# Patient Record
Sex: FEMALE | Race: WHITE | NOT HISPANIC OR LATINO | Employment: OTHER | URBAN - METROPOLITAN AREA
[De-identification: names, ages, dates, MRNs, and addresses within clinical notes are randomized per-mention and may not be internally consistent; named-entity substitution may affect disease eponyms.]

---

## 2017-01-06 ENCOUNTER — AMBULATORY - HEALTHEAST (OUTPATIENT)
Dept: INFUSION THERAPY | Facility: CLINIC | Age: 77
End: 2017-01-06

## 2017-01-06 ENCOUNTER — OFFICE VISIT - HEALTHEAST (OUTPATIENT)
Dept: ONCOLOGY | Facility: CLINIC | Age: 77
End: 2017-01-06

## 2017-01-06 ENCOUNTER — INFUSION - HEALTHEAST (OUTPATIENT)
Dept: INFUSION THERAPY | Facility: CLINIC | Age: 77
End: 2017-01-06

## 2017-01-06 DIAGNOSIS — D45 POLYCYTHEMIA VERA (H): ICD-10-CM

## 2017-01-06 ASSESSMENT — MIFFLIN-ST. JEOR: SCORE: 1052.18

## 2017-02-01 ENCOUNTER — INFUSION - HEALTHEAST (OUTPATIENT)
Dept: INFUSION THERAPY | Facility: CLINIC | Age: 77
End: 2017-02-01

## 2017-02-01 DIAGNOSIS — D45 POLYCYTHEMIA VERA (H): ICD-10-CM

## 2017-03-08 ENCOUNTER — INFUSION - HEALTHEAST (OUTPATIENT)
Dept: INFUSION THERAPY | Facility: CLINIC | Age: 77
End: 2017-03-08

## 2017-03-08 DIAGNOSIS — D45 POLYCYTHEMIA VERA (H): ICD-10-CM

## 2017-03-29 ENCOUNTER — COMMUNICATION - HEALTHEAST (OUTPATIENT)
Dept: FAMILY MEDICINE | Facility: CLINIC | Age: 77
End: 2017-03-29

## 2017-04-03 ENCOUNTER — OFFICE VISIT - HEALTHEAST (OUTPATIENT)
Dept: FAMILY MEDICINE | Facility: CLINIC | Age: 77
End: 2017-04-03

## 2017-04-03 DIAGNOSIS — H26.9 CATARACT: ICD-10-CM

## 2017-04-03 DIAGNOSIS — Z01.818 PREOP EXAMINATION: ICD-10-CM

## 2017-04-03 ASSESSMENT — MIFFLIN-ST. JEOR: SCORE: 1027.47

## 2017-04-14 ENCOUNTER — INFUSION - HEALTHEAST (OUTPATIENT)
Dept: INFUSION THERAPY | Facility: CLINIC | Age: 77
End: 2017-04-14

## 2017-04-14 DIAGNOSIS — D45 POLYCYTHEMIA VERA (H): ICD-10-CM

## 2017-05-10 ENCOUNTER — OFFICE VISIT - HEALTHEAST (OUTPATIENT)
Dept: ONCOLOGY | Facility: CLINIC | Age: 77
End: 2017-05-10

## 2017-05-10 ENCOUNTER — AMBULATORY - HEALTHEAST (OUTPATIENT)
Dept: INFUSION THERAPY | Facility: CLINIC | Age: 77
End: 2017-05-10

## 2017-05-10 DIAGNOSIS — D45 POLYCYTHEMIA VERA (H): ICD-10-CM

## 2017-05-12 ENCOUNTER — OFFICE VISIT - HEALTHEAST (OUTPATIENT)
Dept: FAMILY MEDICINE | Facility: CLINIC | Age: 77
End: 2017-05-12

## 2017-05-12 DIAGNOSIS — Z01.818 PREOP EXAMINATION: ICD-10-CM

## 2017-05-12 DIAGNOSIS — H26.9 CATARACT: ICD-10-CM

## 2017-08-08 ENCOUNTER — COMMUNICATION - HEALTHEAST (OUTPATIENT)
Dept: ONCOLOGY | Facility: CLINIC | Age: 77
End: 2017-08-08

## 2017-09-08 ENCOUNTER — AMBULATORY - HEALTHEAST (OUTPATIENT)
Dept: INFUSION THERAPY | Facility: CLINIC | Age: 77
End: 2017-09-08

## 2017-09-08 ENCOUNTER — COMMUNICATION - HEALTHEAST (OUTPATIENT)
Dept: ONCOLOGY | Facility: HOSPITAL | Age: 77
End: 2017-09-08

## 2017-09-08 ENCOUNTER — OFFICE VISIT - HEALTHEAST (OUTPATIENT)
Dept: ONCOLOGY | Facility: CLINIC | Age: 77
End: 2017-09-08

## 2017-09-08 DIAGNOSIS — D45 POLYCYTHEMIA VERA (H): ICD-10-CM

## 2017-09-11 ENCOUNTER — COMMUNICATION - HEALTHEAST (OUTPATIENT)
Dept: ONCOLOGY | Facility: HOSPITAL | Age: 77
End: 2017-09-11

## 2017-09-12 ENCOUNTER — COMMUNICATION - HEALTHEAST (OUTPATIENT)
Dept: ADMINISTRATIVE | Facility: HOSPITAL | Age: 77
End: 2017-09-12

## 2017-09-18 ENCOUNTER — COMMUNICATION - HEALTHEAST (OUTPATIENT)
Dept: ONCOLOGY | Facility: CLINIC | Age: 77
End: 2017-09-18

## 2017-09-21 ENCOUNTER — INFUSION - HEALTHEAST (OUTPATIENT)
Dept: INFUSION THERAPY | Facility: CLINIC | Age: 77
End: 2017-09-21

## 2017-09-21 DIAGNOSIS — D45 POLYCYTHEMIA VERA (H): ICD-10-CM

## 2017-10-12 ENCOUNTER — OFFICE VISIT - HEALTHEAST (OUTPATIENT)
Dept: FAMILY MEDICINE | Facility: CLINIC | Age: 77
End: 2017-10-12

## 2017-10-12 DIAGNOSIS — M54.16 LUMBAR RADICULOPATHY: ICD-10-CM

## 2017-10-13 ENCOUNTER — COMMUNICATION - HEALTHEAST (OUTPATIENT)
Dept: FAMILY MEDICINE | Facility: CLINIC | Age: 77
End: 2017-10-13

## 2017-10-26 ENCOUNTER — RECORDS - HEALTHEAST (OUTPATIENT)
Dept: ADMINISTRATIVE | Facility: OTHER | Age: 77
End: 2017-10-26

## 2017-11-02 ENCOUNTER — COMMUNICATION - HEALTHEAST (OUTPATIENT)
Dept: FAMILY MEDICINE | Facility: CLINIC | Age: 77
End: 2017-11-02

## 2017-11-02 ENCOUNTER — INFUSION - HEALTHEAST (OUTPATIENT)
Dept: INFUSION THERAPY | Facility: CLINIC | Age: 77
End: 2017-11-02

## 2017-11-02 ENCOUNTER — AMBULATORY - HEALTHEAST (OUTPATIENT)
Dept: FAMILY MEDICINE | Facility: CLINIC | Age: 77
End: 2017-11-02

## 2017-11-02 DIAGNOSIS — D45 POLYCYTHEMIA VERA (H): ICD-10-CM

## 2017-11-02 DIAGNOSIS — M54.50 LOWER BACK PAIN: ICD-10-CM

## 2017-11-17 ENCOUNTER — OFFICE VISIT - HEALTHEAST (OUTPATIENT)
Dept: PHYSICAL THERAPY | Facility: REHABILITATION | Age: 77
End: 2017-11-17

## 2017-11-17 DIAGNOSIS — M54.42 CHRONIC LOW BACK PAIN WITH LEFT-SIDED SCIATICA: ICD-10-CM

## 2017-11-17 DIAGNOSIS — M54.42 CHRONIC LEFT-SIDED LOW BACK PAIN WITH LEFT-SIDED SCIATICA: ICD-10-CM

## 2017-11-17 DIAGNOSIS — R29.898 WEAKNESS OF LEFT HIP: ICD-10-CM

## 2017-11-17 DIAGNOSIS — G89.29 CHRONIC LEFT-SIDED LOW BACK PAIN WITH LEFT-SIDED SCIATICA: ICD-10-CM

## 2017-11-17 DIAGNOSIS — G89.29 CHRONIC LOW BACK PAIN WITH LEFT-SIDED SCIATICA: ICD-10-CM

## 2017-11-17 DIAGNOSIS — R29.3 POOR POSTURE: ICD-10-CM

## 2017-11-21 ENCOUNTER — OFFICE VISIT - HEALTHEAST (OUTPATIENT)
Dept: PHYSICAL THERAPY | Facility: REHABILITATION | Age: 77
End: 2017-11-21

## 2017-11-21 DIAGNOSIS — G89.29 CHRONIC LOW BACK PAIN WITH LEFT-SIDED SCIATICA: ICD-10-CM

## 2017-11-21 DIAGNOSIS — M54.42 CHRONIC LEFT-SIDED LOW BACK PAIN WITH LEFT-SIDED SCIATICA: ICD-10-CM

## 2017-11-21 DIAGNOSIS — M54.42 CHRONIC LOW BACK PAIN WITH LEFT-SIDED SCIATICA: ICD-10-CM

## 2017-11-21 DIAGNOSIS — G89.29 CHRONIC LEFT-SIDED LOW BACK PAIN WITH LEFT-SIDED SCIATICA: ICD-10-CM

## 2017-11-21 DIAGNOSIS — R29.3 POOR POSTURE: ICD-10-CM

## 2017-11-21 DIAGNOSIS — R29.898 WEAKNESS OF LEFT HIP: ICD-10-CM

## 2017-11-29 ENCOUNTER — OFFICE VISIT - HEALTHEAST (OUTPATIENT)
Dept: PHYSICAL THERAPY | Facility: REHABILITATION | Age: 77
End: 2017-11-29

## 2017-11-29 DIAGNOSIS — G89.29 CHRONIC LEFT-SIDED LOW BACK PAIN WITH LEFT-SIDED SCIATICA: ICD-10-CM

## 2017-11-29 DIAGNOSIS — M54.42 CHRONIC LEFT-SIDED LOW BACK PAIN WITH LEFT-SIDED SCIATICA: ICD-10-CM

## 2017-11-29 DIAGNOSIS — R29.3 POOR POSTURE: ICD-10-CM

## 2017-11-29 DIAGNOSIS — R29.898 WEAKNESS OF LEFT HIP: ICD-10-CM

## 2017-12-13 ENCOUNTER — OFFICE VISIT - HEALTHEAST (OUTPATIENT)
Dept: PHYSICAL THERAPY | Facility: REHABILITATION | Age: 77
End: 2017-12-13

## 2017-12-13 DIAGNOSIS — G89.29 CHRONIC LEFT-SIDED LOW BACK PAIN WITH LEFT-SIDED SCIATICA: ICD-10-CM

## 2017-12-13 DIAGNOSIS — R29.3 POOR POSTURE: ICD-10-CM

## 2017-12-13 DIAGNOSIS — M54.42 CHRONIC LEFT-SIDED LOW BACK PAIN WITH LEFT-SIDED SCIATICA: ICD-10-CM

## 2017-12-13 DIAGNOSIS — R29.898 WEAKNESS OF LEFT HIP: ICD-10-CM

## 2017-12-14 ENCOUNTER — OFFICE VISIT - HEALTHEAST (OUTPATIENT)
Dept: ONCOLOGY | Facility: CLINIC | Age: 77
End: 2017-12-14

## 2017-12-14 ENCOUNTER — INFUSION - HEALTHEAST (OUTPATIENT)
Dept: INFUSION THERAPY | Facility: CLINIC | Age: 77
End: 2017-12-14

## 2017-12-14 ENCOUNTER — AMBULATORY - HEALTHEAST (OUTPATIENT)
Dept: INFUSION THERAPY | Facility: CLINIC | Age: 77
End: 2017-12-14

## 2017-12-14 DIAGNOSIS — D45 POLYCYTHEMIA VERA (H): ICD-10-CM

## 2017-12-14 ASSESSMENT — MIFFLIN-ST. JEOR: SCORE: 1027.92

## 2018-02-08 ENCOUNTER — INFUSION - HEALTHEAST (OUTPATIENT)
Dept: INFUSION THERAPY | Facility: CLINIC | Age: 78
End: 2018-02-08

## 2018-02-08 DIAGNOSIS — D45 POLYCYTHEMIA VERA (H): ICD-10-CM

## 2018-02-08 LAB
HCT VFR BLD AUTO: 45.2 % (ref 35–47)
HGB BLD-MCNC: 14.7 G/DL (ref 12–16)

## 2018-04-03 ENCOUNTER — OFFICE VISIT - HEALTHEAST (OUTPATIENT)
Dept: FAMILY MEDICINE | Facility: CLINIC | Age: 78
End: 2018-04-03

## 2018-04-03 ENCOUNTER — RECORDS - HEALTHEAST (OUTPATIENT)
Dept: GENERAL RADIOLOGY | Facility: CLINIC | Age: 78
End: 2018-04-03

## 2018-04-03 DIAGNOSIS — Z12.11 SCREEN FOR COLON CANCER: ICD-10-CM

## 2018-04-03 DIAGNOSIS — S49.90XA UNSPECIFIED INJURY OF SHOULDER AND UPPER ARM, UNSPECIFIED ARM, INITIAL ENCOUNTER: ICD-10-CM

## 2018-04-03 DIAGNOSIS — S49.90XA ARM INJURY: ICD-10-CM

## 2018-04-25 ENCOUNTER — INFUSION - HEALTHEAST (OUTPATIENT)
Dept: INFUSION THERAPY | Facility: CLINIC | Age: 78
End: 2018-04-25

## 2018-04-25 DIAGNOSIS — D45 POLYCYTHEMIA VERA (H): ICD-10-CM

## 2018-04-25 LAB
HCT VFR BLD AUTO: 48.3 % (ref 35–47)
HGB BLD-MCNC: 15.4 G/DL (ref 12–16)

## 2018-04-30 ENCOUNTER — OFFICE VISIT - HEALTHEAST (OUTPATIENT)
Dept: FAMILY MEDICINE | Facility: CLINIC | Age: 78
End: 2018-04-30

## 2018-04-30 DIAGNOSIS — M79.603 ARM PAIN: ICD-10-CM

## 2018-05-11 ENCOUNTER — COMMUNICATION - HEALTHEAST (OUTPATIENT)
Dept: ADMINISTRATIVE | Facility: CLINIC | Age: 78
End: 2018-05-11

## 2018-05-18 ENCOUNTER — AMBULATORY - HEALTHEAST (OUTPATIENT)
Dept: FAMILY MEDICINE | Facility: CLINIC | Age: 78
End: 2018-05-18

## 2018-05-18 DIAGNOSIS — M79.629 AXILLARY PAIN: ICD-10-CM

## 2018-05-18 DIAGNOSIS — M79.603 ARM PAIN: ICD-10-CM

## 2018-05-25 ENCOUNTER — RECORDS - HEALTHEAST (OUTPATIENT)
Dept: ADMINISTRATIVE | Facility: OTHER | Age: 78
End: 2018-05-25

## 2018-05-29 ENCOUNTER — OFFICE VISIT - HEALTHEAST (OUTPATIENT)
Dept: FAMILY MEDICINE | Facility: CLINIC | Age: 78
End: 2018-05-29

## 2018-05-29 DIAGNOSIS — M81.0 SENILE OSTEOPOROSIS: ICD-10-CM

## 2018-05-29 DIAGNOSIS — D45 POLYCYTHEMIA VERA (H): ICD-10-CM

## 2018-05-29 DIAGNOSIS — Z76.89 ESTABLISHING CARE WITH NEW DOCTOR, ENCOUNTER FOR: ICD-10-CM

## 2018-05-29 DIAGNOSIS — M79.622 PAIN OF LEFT UPPER ARM: ICD-10-CM

## 2018-05-29 ASSESSMENT — MIFFLIN-ST. JEOR: SCORE: 1022.99

## 2018-06-06 ENCOUNTER — COMMUNICATION - HEALTHEAST (OUTPATIENT)
Dept: FAMILY MEDICINE | Facility: CLINIC | Age: 78
End: 2018-06-06

## 2018-06-06 DIAGNOSIS — M75.101 ROTATOR CUFF TEAR ARTHROPATHY OF RIGHT SHOULDER: ICD-10-CM

## 2018-06-06 DIAGNOSIS — M12.811 ROTATOR CUFF TEAR ARTHROPATHY OF RIGHT SHOULDER: ICD-10-CM

## 2018-06-26 ENCOUNTER — OFFICE VISIT - HEALTHEAST (OUTPATIENT)
Dept: ONCOLOGY | Facility: CLINIC | Age: 78
End: 2018-06-26

## 2018-06-26 ENCOUNTER — INFUSION - HEALTHEAST (OUTPATIENT)
Dept: INFUSION THERAPY | Facility: CLINIC | Age: 78
End: 2018-06-26

## 2018-06-26 ENCOUNTER — AMBULATORY - HEALTHEAST (OUTPATIENT)
Dept: INFUSION THERAPY | Facility: CLINIC | Age: 78
End: 2018-06-26

## 2018-06-26 DIAGNOSIS — D45 POLYCYTHEMIA VERA (H): ICD-10-CM

## 2018-06-26 LAB
HCT VFR BLD AUTO: 45.7 % (ref 35–47)
HGB BLD-MCNC: 14.8 G/DL (ref 12–16)

## 2018-06-26 ASSESSMENT — MIFFLIN-ST. JEOR: SCORE: 1031.59

## 2018-07-10 ENCOUNTER — RECORDS - HEALTHEAST (OUTPATIENT)
Dept: ADMINISTRATIVE | Facility: OTHER | Age: 78
End: 2018-07-10

## 2018-08-21 ENCOUNTER — RECORDS - HEALTHEAST (OUTPATIENT)
Dept: ADMINISTRATIVE | Facility: OTHER | Age: 78
End: 2018-08-21

## 2018-08-29 ENCOUNTER — INFUSION - HEALTHEAST (OUTPATIENT)
Dept: INFUSION THERAPY | Facility: CLINIC | Age: 78
End: 2018-08-29

## 2018-08-29 DIAGNOSIS — D45 POLYCYTHEMIA VERA (H): ICD-10-CM

## 2018-08-29 LAB
HCT VFR BLD AUTO: 44 % (ref 35–47)
HGB BLD-MCNC: 14.6 G/DL (ref 12–16)

## 2018-11-06 ENCOUNTER — INFUSION - HEALTHEAST (OUTPATIENT)
Dept: INFUSION THERAPY | Facility: CLINIC | Age: 78
End: 2018-11-06

## 2018-11-06 DIAGNOSIS — D45 POLYCYTHEMIA VERA (H): ICD-10-CM

## 2018-11-06 LAB
HCT VFR BLD AUTO: 49.2 % (ref 35–47)
HGB BLD-MCNC: 15.7 G/DL (ref 12–16)

## 2019-01-09 ENCOUNTER — AMBULATORY - HEALTHEAST (OUTPATIENT)
Dept: INFUSION THERAPY | Facility: CLINIC | Age: 79
End: 2019-01-09

## 2019-01-09 ENCOUNTER — OFFICE VISIT - HEALTHEAST (OUTPATIENT)
Dept: ONCOLOGY | Facility: CLINIC | Age: 79
End: 2019-01-09

## 2019-01-09 ENCOUNTER — INFUSION - HEALTHEAST (OUTPATIENT)
Dept: INFUSION THERAPY | Facility: CLINIC | Age: 79
End: 2019-01-09

## 2019-01-09 DIAGNOSIS — D45 POLYCYTHEMIA VERA (H): ICD-10-CM

## 2019-01-09 LAB
HCT VFR BLD AUTO: 46.8 % (ref 35–47)
HGB BLD-MCNC: 14.9 G/DL (ref 12–16)

## 2019-03-20 ENCOUNTER — INFUSION - HEALTHEAST (OUTPATIENT)
Dept: INFUSION THERAPY | Facility: CLINIC | Age: 79
End: 2019-03-20

## 2019-03-20 DIAGNOSIS — D45 POLYCYTHEMIA VERA (H): ICD-10-CM

## 2019-03-20 LAB
HCT VFR BLD AUTO: 46.4 % (ref 35–47)
HGB BLD-MCNC: 14.8 G/DL (ref 12–16)

## 2019-06-12 ENCOUNTER — INFUSION - HEALTHEAST (OUTPATIENT)
Dept: INFUSION THERAPY | Facility: CLINIC | Age: 79
End: 2019-06-12

## 2019-06-12 DIAGNOSIS — D45 POLYCYTHEMIA VERA (H): ICD-10-CM

## 2019-06-12 LAB
HCT VFR BLD AUTO: 48.5 % (ref 35–47)
HGB BLD-MCNC: 15.4 G/DL (ref 12–16)

## 2019-09-10 ENCOUNTER — AMBULATORY - HEALTHEAST (OUTPATIENT)
Dept: INFUSION THERAPY | Facility: CLINIC | Age: 79
End: 2019-09-10

## 2019-09-10 ENCOUNTER — OFFICE VISIT - HEALTHEAST (OUTPATIENT)
Dept: ONCOLOGY | Facility: CLINIC | Age: 79
End: 2019-09-10

## 2019-09-10 ENCOUNTER — INFUSION - HEALTHEAST (OUTPATIENT)
Dept: INFUSION THERAPY | Facility: CLINIC | Age: 79
End: 2019-09-10

## 2019-09-10 DIAGNOSIS — D45 POLYCYTHEMIA VERA (H): ICD-10-CM

## 2019-09-10 DIAGNOSIS — F41.9 ANXIETY: ICD-10-CM

## 2019-09-10 LAB
HCT VFR BLD AUTO: 50.7 % (ref 35–47)
HGB BLD-MCNC: 16.4 G/DL (ref 12–16)

## 2019-10-08 ENCOUNTER — INFUSION - HEALTHEAST (OUTPATIENT)
Dept: INFUSION THERAPY | Facility: CLINIC | Age: 79
End: 2019-10-08

## 2019-10-08 DIAGNOSIS — D45 POLYCYTHEMIA VERA (H): ICD-10-CM

## 2019-11-05 ENCOUNTER — INFUSION - HEALTHEAST (OUTPATIENT)
Dept: INFUSION THERAPY | Facility: CLINIC | Age: 79
End: 2019-11-05

## 2019-11-05 ENCOUNTER — AMBULATORY - HEALTHEAST (OUTPATIENT)
Dept: INFUSION THERAPY | Facility: CLINIC | Age: 79
End: 2019-11-05

## 2019-11-05 ENCOUNTER — OFFICE VISIT - HEALTHEAST (OUTPATIENT)
Dept: ONCOLOGY | Facility: CLINIC | Age: 79
End: 2019-11-05

## 2019-11-05 DIAGNOSIS — D75.1 POLYCYTHEMIA: ICD-10-CM

## 2019-11-05 DIAGNOSIS — D45 POLYCYTHEMIA VERA (H): ICD-10-CM

## 2019-11-05 LAB
HCT VFR BLD AUTO: 44.9 % (ref 35–47)
HGB BLD-MCNC: 14.4 G/DL (ref 12–16)

## 2019-11-12 ENCOUNTER — OFFICE VISIT - HEALTHEAST (OUTPATIENT)
Dept: FAMILY MEDICINE | Facility: CLINIC | Age: 79
End: 2019-11-12

## 2019-11-12 DIAGNOSIS — R00.2 PALPITATIONS: ICD-10-CM

## 2019-11-12 DIAGNOSIS — D35.1 BENIGN NEOPLASM OF PARATHYROID GLAND: ICD-10-CM

## 2019-11-12 DIAGNOSIS — I49.3 PVC'S (PREMATURE VENTRICULAR CONTRACTIONS): ICD-10-CM

## 2019-11-12 DIAGNOSIS — M81.0 SENILE OSTEOPOROSIS: ICD-10-CM

## 2019-11-12 DIAGNOSIS — G89.29 CHRONIC LEFT-SIDED LOW BACK PAIN WITH LEFT-SIDED SCIATICA: ICD-10-CM

## 2019-11-12 DIAGNOSIS — D45 POLYCYTHEMIA VERA (H): ICD-10-CM

## 2019-11-12 DIAGNOSIS — F41.1 GAD (GENERALIZED ANXIETY DISORDER): ICD-10-CM

## 2019-11-12 DIAGNOSIS — M54.42 CHRONIC LEFT-SIDED LOW BACK PAIN WITH LEFT-SIDED SCIATICA: ICD-10-CM

## 2019-11-12 DIAGNOSIS — Z13.228 SCREENING FOR METABOLIC DISORDER: ICD-10-CM

## 2019-11-12 DIAGNOSIS — L21.9 SEBORRHEIC DERMATITIS OF SCALP: ICD-10-CM

## 2019-11-12 DIAGNOSIS — Z00.01 ENCOUNTER FOR GENERAL ADULT MEDICAL EXAMINATION WITH ABNORMAL FINDINGS: ICD-10-CM

## 2019-11-12 LAB
ATRIAL RATE - MUSE: 69 BPM
CALCIUM SERPL-MCNC: 9.3 MG/DL (ref 8.5–10.5)
CHOLEST SERPL-MCNC: 197 MG/DL
DIASTOLIC BLOOD PRESSURE - MUSE: NORMAL
FASTING STATUS PATIENT QL REPORTED: YES
FASTING STATUS PATIENT QL REPORTED: YES
GLUCOSE BLD-MCNC: 96 MG/DL (ref 70–99)
HDLC SERPL-MCNC: 57 MG/DL
INTERPRETATION ECG - MUSE: NORMAL
LDLC SERPL CALC-MCNC: 118 MG/DL
P AXIS - MUSE: 73 DEGREES
PR INTERVAL - MUSE: 136 MS
PTH-INTACT SERPL-MCNC: 48 PG/ML (ref 10–86)
QRS DURATION - MUSE: 82 MS
QT - MUSE: 402 MS
QTC - MUSE: 430 MS
R AXIS - MUSE: 6 DEGREES
SYSTOLIC BLOOD PRESSURE - MUSE: NORMAL
T AXIS - MUSE: 49 DEGREES
TRIGL SERPL-MCNC: 112 MG/DL
VENTRICULAR RATE- MUSE: 69 BPM

## 2019-11-12 RX ORDER — CLOBETASOL PROPIONATE 0.5 MG/ML
SOLUTION TOPICAL
Qty: 50 ML | Refills: 3 | Status: SHIPPED | OUTPATIENT
Start: 2019-11-12 | End: 2021-08-26

## 2019-11-12 ASSESSMENT — ANXIETY QUESTIONNAIRES
4. TROUBLE RELAXING: NOT AT ALL
IF YOU CHECKED OFF ANY PROBLEMS ON THIS QUESTIONNAIRE, HOW DIFFICULT HAVE THESE PROBLEMS MADE IT FOR YOU TO DO YOUR WORK, TAKE CARE OF THINGS AT HOME, OR GET ALONG WITH OTHER PEOPLE: NOT DIFFICULT AT ALL
1. FEELING NERVOUS, ANXIOUS, OR ON EDGE: MORE THAN HALF THE DAYS
6. BECOMING EASILY ANNOYED OR IRRITABLE: NOT AT ALL
5. BEING SO RESTLESS THAT IT IS HARD TO SIT STILL: NOT AT ALL
2. NOT BEING ABLE TO STOP OR CONTROL WORRYING: NOT AT ALL
3. WORRYING TOO MUCH ABOUT DIFFERENT THINGS: NOT AT ALL
7. FEELING AFRAID AS IF SOMETHING AWFUL MIGHT HAPPEN: NOT AT ALL
GAD7 TOTAL SCORE: 2

## 2019-11-12 ASSESSMENT — PATIENT HEALTH QUESTIONNAIRE - PHQ9: SUM OF ALL RESPONSES TO PHQ QUESTIONS 1-9: 1

## 2019-11-12 ASSESSMENT — MIFFLIN-ST. JEOR: SCORE: 1008.42

## 2019-11-19 ENCOUNTER — COMMUNICATION - HEALTHEAST (OUTPATIENT)
Dept: FAMILY MEDICINE | Facility: CLINIC | Age: 79
End: 2019-11-19

## 2019-11-22 ENCOUNTER — COMMUNICATION - HEALTHEAST (OUTPATIENT)
Dept: PHYSICAL MEDICINE AND REHAB | Facility: CLINIC | Age: 79
End: 2019-11-22

## 2019-12-05 ENCOUNTER — RECORDS - HEALTHEAST (OUTPATIENT)
Dept: GENERAL RADIOLOGY | Facility: CLINIC | Age: 79
End: 2019-12-05

## 2019-12-05 ENCOUNTER — RECORDS - HEALTHEAST (OUTPATIENT)
Dept: BONE DENSITY | Facility: CLINIC | Age: 79
End: 2019-12-05

## 2019-12-05 ENCOUNTER — RECORDS - HEALTHEAST (OUTPATIENT)
Dept: ADMINISTRATIVE | Facility: OTHER | Age: 79
End: 2019-12-05

## 2019-12-05 DIAGNOSIS — M81.0 AGE-RELATED OSTEOPOROSIS WITHOUT CURRENT PATHOLOGICAL FRACTURE: ICD-10-CM

## 2019-12-05 DIAGNOSIS — M54.42 LUMBAGO WITH SCIATICA, LEFT SIDE: ICD-10-CM

## 2019-12-05 DIAGNOSIS — G89.29 OTHER CHRONIC PAIN: ICD-10-CM

## 2020-01-07 ENCOUNTER — AMBULATORY - HEALTHEAST (OUTPATIENT)
Dept: ONCOLOGY | Facility: CLINIC | Age: 80
End: 2020-01-07

## 2020-01-07 ENCOUNTER — INFUSION - HEALTHEAST (OUTPATIENT)
Dept: INFUSION THERAPY | Facility: CLINIC | Age: 80
End: 2020-01-07

## 2020-01-07 DIAGNOSIS — D45 POLYCYTHEMIA VERA (H): ICD-10-CM

## 2020-01-07 LAB
HCT VFR BLD AUTO: 47.9 % (ref 35–47)
HGB BLD-MCNC: 15.1 G/DL (ref 12–16)

## 2020-01-16 ENCOUNTER — OFFICE VISIT - HEALTHEAST (OUTPATIENT)
Dept: PHYSICAL MEDICINE AND REHAB | Facility: REHABILITATION | Age: 80
End: 2020-01-16

## 2020-01-16 DIAGNOSIS — M47.816 LUMBAR FACET ARTHROPATHY: ICD-10-CM

## 2020-01-16 DIAGNOSIS — M43.16 SPONDYLOLISTHESIS OF LUMBAR REGION: ICD-10-CM

## 2020-01-16 DIAGNOSIS — M54.16 LEFT LUMBAR RADICULITIS: ICD-10-CM

## 2020-01-16 DIAGNOSIS — M51.369 DDD (DEGENERATIVE DISC DISEASE), LUMBAR: ICD-10-CM

## 2020-01-16 DIAGNOSIS — M54.42 CHRONIC LEFT-SIDED LOW BACK PAIN WITH LEFT-SIDED SCIATICA: ICD-10-CM

## 2020-01-16 DIAGNOSIS — G89.29 CHRONIC LEFT-SIDED LOW BACK PAIN WITH LEFT-SIDED SCIATICA: ICD-10-CM

## 2020-01-16 ASSESSMENT — MIFFLIN-ST. JEOR: SCORE: 1007.05

## 2020-01-29 ENCOUNTER — OFFICE VISIT - HEALTHEAST (OUTPATIENT)
Dept: PHYSICAL THERAPY | Facility: REHABILITATION | Age: 80
End: 2020-01-29

## 2020-01-29 DIAGNOSIS — M47.816 LUMBAR FACET ARTHROPATHY: ICD-10-CM

## 2020-01-29 DIAGNOSIS — M51.369 DDD (DEGENERATIVE DISC DISEASE), LUMBAR: ICD-10-CM

## 2020-01-29 DIAGNOSIS — M54.16 LEFT LUMBAR RADICULITIS: ICD-10-CM

## 2020-01-29 DIAGNOSIS — G89.29 CHRONIC LEFT-SIDED LOW BACK PAIN WITH LEFT-SIDED SCIATICA: ICD-10-CM

## 2020-01-29 DIAGNOSIS — M43.16 SPONDYLOLISTHESIS OF LUMBAR REGION: ICD-10-CM

## 2020-01-29 DIAGNOSIS — M54.42 CHRONIC LEFT-SIDED LOW BACK PAIN WITH LEFT-SIDED SCIATICA: ICD-10-CM

## 2020-03-05 ENCOUNTER — AMBULATORY - HEALTHEAST (OUTPATIENT)
Dept: ENDOCRINOLOGY | Facility: CLINIC | Age: 80
End: 2020-03-05

## 2020-03-05 DIAGNOSIS — M81.0 OSTEOPOROSIS: ICD-10-CM

## 2020-03-06 ENCOUNTER — AMBULATORY - HEALTHEAST (OUTPATIENT)
Dept: ONCOLOGY | Facility: CLINIC | Age: 80
End: 2020-03-06

## 2020-03-06 DIAGNOSIS — D45 POLYCYTHEMIA VERA (H): ICD-10-CM

## 2020-03-09 ENCOUNTER — AMBULATORY - HEALTHEAST (OUTPATIENT)
Dept: INFUSION THERAPY | Facility: CLINIC | Age: 80
End: 2020-03-09

## 2020-03-09 ENCOUNTER — INFUSION - HEALTHEAST (OUTPATIENT)
Dept: INFUSION THERAPY | Facility: CLINIC | Age: 80
End: 2020-03-09

## 2020-03-09 ENCOUNTER — OFFICE VISIT - HEALTHEAST (OUTPATIENT)
Dept: ONCOLOGY | Facility: CLINIC | Age: 80
End: 2020-03-09

## 2020-03-09 DIAGNOSIS — D75.1 POLYCYTHEMIA: ICD-10-CM

## 2020-03-09 DIAGNOSIS — D45 POLYCYTHEMIA VERA (H): ICD-10-CM

## 2020-03-09 DIAGNOSIS — M81.0 OSTEOPOROSIS: ICD-10-CM

## 2020-03-09 DIAGNOSIS — D35.1 BENIGN NEOPLASM OF PARATHYROID GLAND: ICD-10-CM

## 2020-03-09 LAB
FERRITIN SERPL-MCNC: 6 NG/ML (ref 10–130)
HCT VFR BLD AUTO: 44.7 % (ref 35–47)
HGB BLD-MCNC: 14.1 G/DL (ref 12–16)

## 2020-03-10 ENCOUNTER — COMMUNICATION - HEALTHEAST (OUTPATIENT)
Dept: ONCOLOGY | Facility: CLINIC | Age: 80
End: 2020-03-10

## 2020-03-10 LAB
25(OH)D3 SERPL-MCNC: 32.4 NG/ML (ref 30–80)
25(OH)D3 SERPL-MCNC: 32.4 NG/ML (ref 30–80)

## 2020-03-16 ENCOUNTER — AMBULATORY - HEALTHEAST (OUTPATIENT)
Dept: INFUSION THERAPY | Facility: CLINIC | Age: 80
End: 2020-03-16

## 2020-03-16 DIAGNOSIS — D45 POLYCYTHEMIA VERA (H): ICD-10-CM

## 2020-03-17 ENCOUNTER — COMMUNICATION - HEALTHEAST (OUTPATIENT)
Dept: ADMINISTRATIVE | Facility: HOSPITAL | Age: 80
End: 2020-03-17

## 2020-03-18 LAB — EPO SERPL-ACNC: 11 MU/ML (ref 4–27)

## 2020-03-23 ENCOUNTER — OFFICE VISIT - HEALTHEAST (OUTPATIENT)
Dept: ONCOLOGY | Facility: CLINIC | Age: 80
End: 2020-03-23

## 2020-03-23 DIAGNOSIS — D45 POLYCYTHEMIA VERA (H): ICD-10-CM

## 2020-03-30 LAB
MISCELLANEOUS TEST DEPT. - HE HISTORICAL: NORMAL
PERFORMING LAB: NORMAL
SPECIMEN STATUS: NORMAL
TEST NAME: NORMAL

## 2020-03-31 ENCOUNTER — AMBULATORY - HEALTHEAST (OUTPATIENT)
Dept: ENDOCRINOLOGY | Facility: CLINIC | Age: 80
End: 2020-03-31

## 2020-03-31 DIAGNOSIS — M81.0 OSTEOPOROSIS: ICD-10-CM

## 2020-04-17 ENCOUNTER — AMBULATORY - HEALTHEAST (OUTPATIENT)
Dept: INFUSION THERAPY | Facility: CLINIC | Age: 80
End: 2020-04-17

## 2020-04-17 DIAGNOSIS — D45 POLYCYTHEMIA VERA (H): ICD-10-CM

## 2020-04-17 LAB
BASOPHILS # BLD AUTO: 0.1 THOU/UL (ref 0–0.2)
BASOPHILS NFR BLD AUTO: 1 % (ref 0–2)
EOSINOPHIL # BLD AUTO: 0.1 THOU/UL (ref 0–0.4)
EOSINOPHIL NFR BLD AUTO: 1 % (ref 0–6)
ERYTHROCYTE [DISTWIDTH] IN BLOOD BY AUTOMATED COUNT: 18 % (ref 11–14.5)
FERRITIN SERPL-MCNC: 6 NG/ML (ref 10–130)
HCT VFR BLD AUTO: 46.8 % (ref 35–47)
HGB BLD-MCNC: 14.8 G/DL (ref 12–16)
LYMPHOCYTES # BLD AUTO: 1.5 THOU/UL (ref 0.8–4.4)
LYMPHOCYTES NFR BLD AUTO: 21 % (ref 20–40)
MCH RBC QN AUTO: 27.9 PG (ref 27–34)
MCHC RBC AUTO-ENTMCNC: 31.6 G/DL (ref 32–36)
MCV RBC AUTO: 88 FL (ref 80–100)
MONOCYTES # BLD AUTO: 0.6 THOU/UL (ref 0–0.9)
MONOCYTES NFR BLD AUTO: 8 % (ref 2–10)
NEUTROPHILS # BLD AUTO: 4.7 THOU/UL (ref 2–7.7)
NEUTROPHILS NFR BLD AUTO: 69 % (ref 50–70)
PLATELET # BLD AUTO: 268 THOU/UL (ref 140–440)
PMV BLD AUTO: 10.1 FL (ref 8.5–12.5)
RBC # BLD AUTO: 5.3 MILL/UL (ref 3.8–5.4)
WBC: 6.8 THOU/UL (ref 4–11)

## 2020-04-20 ENCOUNTER — OFFICE VISIT - HEALTHEAST (OUTPATIENT)
Dept: ONCOLOGY | Facility: CLINIC | Age: 80
End: 2020-04-20

## 2020-04-20 DIAGNOSIS — D45 POLYCYTHEMIA VERA (H): ICD-10-CM

## 2020-05-16 ENCOUNTER — COMMUNICATION - HEALTHEAST (OUTPATIENT)
Dept: ONCOLOGY | Facility: CLINIC | Age: 80
End: 2020-05-16

## 2020-05-16 DIAGNOSIS — D45 POLYCYTHEMIA VERA (H): ICD-10-CM

## 2020-05-26 ENCOUNTER — AMBULATORY - HEALTHEAST (OUTPATIENT)
Dept: INFUSION THERAPY | Facility: CLINIC | Age: 80
End: 2020-05-26

## 2020-05-26 DIAGNOSIS — D45 POLYCYTHEMIA VERA (H): ICD-10-CM

## 2020-05-26 LAB
ERYTHROCYTE [DISTWIDTH] IN BLOOD BY AUTOMATED COUNT: 21.9 % (ref 11–14.5)
FERRITIN SERPL-MCNC: 13 NG/ML (ref 10–130)
HCT VFR BLD AUTO: 45.4 % (ref 35–47)
HGB BLD-MCNC: 14.8 G/DL (ref 12–16)
MCH RBC QN AUTO: 29.8 PG (ref 27–34)
MCHC RBC AUTO-ENTMCNC: 32.6 G/DL (ref 32–36)
MCV RBC AUTO: 91 FL (ref 80–100)
PLATELET # BLD AUTO: 229 THOU/UL (ref 140–440)
PMV BLD AUTO: 9.2 FL (ref 8.5–12.5)
RBC # BLD AUTO: 4.97 MILL/UL (ref 3.8–5.4)
WBC: 8.2 THOU/UL (ref 4–11)

## 2020-05-28 ENCOUNTER — OFFICE VISIT - HEALTHEAST (OUTPATIENT)
Dept: ONCOLOGY | Facility: CLINIC | Age: 80
End: 2020-05-28

## 2020-05-28 DIAGNOSIS — D45 POLYCYTHEMIA VERA (H): ICD-10-CM

## 2020-06-17 ENCOUNTER — COMMUNICATION - HEALTHEAST (OUTPATIENT)
Dept: ONCOLOGY | Facility: CLINIC | Age: 80
End: 2020-06-17

## 2020-06-17 DIAGNOSIS — D45 POLYCYTHEMIA VERA (H): ICD-10-CM

## 2020-07-10 ENCOUNTER — COMMUNICATION - HEALTHEAST (OUTPATIENT)
Dept: ONCOLOGY | Facility: CLINIC | Age: 80
End: 2020-07-10

## 2020-07-10 ENCOUNTER — AMBULATORY - HEALTHEAST (OUTPATIENT)
Dept: INFUSION THERAPY | Facility: CLINIC | Age: 80
End: 2020-07-10

## 2020-07-10 ENCOUNTER — OFFICE VISIT - HEALTHEAST (OUTPATIENT)
Dept: ONCOLOGY | Facility: CLINIC | Age: 80
End: 2020-07-10

## 2020-07-10 DIAGNOSIS — D45 POLYCYTHEMIA VERA (H): ICD-10-CM

## 2020-07-10 LAB
ERYTHROCYTE [DISTWIDTH] IN BLOOD BY AUTOMATED COUNT: 21.1 % (ref 11–14.5)
HCT VFR BLD AUTO: 46.6 % (ref 35–47)
HGB BLD-MCNC: 15.5 G/DL (ref 12–16)
MCH RBC QN AUTO: 33.2 PG (ref 27–34)
MCHC RBC AUTO-ENTMCNC: 33.3 G/DL (ref 32–36)
MCV RBC AUTO: 100 FL (ref 80–100)
PLATELET # BLD AUTO: 220 THOU/UL (ref 140–440)
PMV BLD AUTO: 9.8 FL (ref 8.5–12.5)
RBC # BLD AUTO: 4.67 MILL/UL (ref 3.8–5.4)
WBC: 5.8 THOU/UL (ref 4–11)

## 2020-07-10 RX ORDER — ASPIRIN 81 MG/1
81 TABLET, CHEWABLE ORAL DAILY
Status: SHIPPED | COMMUNITY
Start: 2020-07-10

## 2020-08-03 ENCOUNTER — COMMUNICATION - HEALTHEAST (OUTPATIENT)
Dept: ONCOLOGY | Facility: HOSPITAL | Age: 80
End: 2020-08-03

## 2020-08-03 DIAGNOSIS — D45 POLYCYTHEMIA VERA (H): ICD-10-CM

## 2020-08-14 ENCOUNTER — COMMUNICATION - HEALTHEAST (OUTPATIENT)
Dept: ONCOLOGY | Facility: HOSPITAL | Age: 80
End: 2020-08-14

## 2020-08-20 ENCOUNTER — AMBULATORY - HEALTHEAST (OUTPATIENT)
Dept: INFUSION THERAPY | Facility: CLINIC | Age: 80
End: 2020-08-20

## 2020-08-20 DIAGNOSIS — D45 POLYCYTHEMIA VERA (H): ICD-10-CM

## 2020-08-20 LAB
BASOPHILS # BLD AUTO: 0.1 THOU/UL (ref 0–0.2)
BASOPHILS NFR BLD AUTO: 1 % (ref 0–2)
EOSINOPHIL # BLD AUTO: 0.1 THOU/UL (ref 0–0.4)
EOSINOPHIL NFR BLD AUTO: 1 % (ref 0–6)
ERYTHROCYTE [DISTWIDTH] IN BLOOD BY AUTOMATED COUNT: 15.6 % (ref 11–14.5)
FERRITIN SERPL-MCNC: 44 NG/ML (ref 10–130)
HCT VFR BLD AUTO: 45 % (ref 35–47)
HGB BLD-MCNC: 15.2 G/DL (ref 12–16)
IMM GRANULOCYTES # BLD: 0 THOU/UL
IMM GRANULOCYTES NFR BLD: 1 %
LYMPHOCYTES # BLD AUTO: 1.7 THOU/UL (ref 0.8–4.4)
LYMPHOCYTES NFR BLD AUTO: 26 % (ref 20–40)
MCH RBC QN AUTO: 35.6 PG (ref 27–34)
MCHC RBC AUTO-ENTMCNC: 33.8 G/DL (ref 32–36)
MCV RBC AUTO: 105 FL (ref 80–100)
MONOCYTES # BLD AUTO: 0.5 THOU/UL (ref 0–0.9)
MONOCYTES NFR BLD AUTO: 8 % (ref 2–10)
NEUTROPHILS # BLD AUTO: 4 THOU/UL (ref 2–7.7)
NEUTROPHILS NFR BLD AUTO: 64 % (ref 50–70)
PLATELET # BLD AUTO: 215 THOU/UL (ref 140–440)
PMV BLD AUTO: 10.1 FL (ref 8.5–12.5)
RBC # BLD AUTO: 4.27 MILL/UL (ref 3.8–5.4)
WBC: 6.3 THOU/UL (ref 4–11)

## 2020-08-27 ENCOUNTER — OFFICE VISIT - HEALTHEAST (OUTPATIENT)
Dept: ONCOLOGY | Facility: CLINIC | Age: 80
End: 2020-08-27

## 2020-08-27 DIAGNOSIS — D45 POLYCYTHEMIA VERA (H): ICD-10-CM

## 2020-10-02 ENCOUNTER — COMMUNICATION - HEALTHEAST (OUTPATIENT)
Dept: ONCOLOGY | Facility: HOSPITAL | Age: 80
End: 2020-10-02

## 2020-10-02 DIAGNOSIS — D45 POLYCYTHEMIA VERA (H): ICD-10-CM

## 2020-10-15 ENCOUNTER — AMBULATORY - HEALTHEAST (OUTPATIENT)
Dept: INFUSION THERAPY | Facility: CLINIC | Age: 80
End: 2020-10-15

## 2020-10-15 DIAGNOSIS — D45 POLYCYTHEMIA VERA (H): ICD-10-CM

## 2020-10-15 LAB
BASOPHILS # BLD AUTO: 0.1 THOU/UL (ref 0–0.2)
BASOPHILS NFR BLD AUTO: 1 % (ref 0–2)
EOSINOPHIL # BLD AUTO: 0.1 THOU/UL (ref 0–0.4)
EOSINOPHIL NFR BLD AUTO: 1 % (ref 0–6)
ERYTHROCYTE [DISTWIDTH] IN BLOOD BY AUTOMATED COUNT: 13.8 % (ref 11–14.5)
HCT VFR BLD AUTO: 43.6 % (ref 35–47)
HGB BLD-MCNC: 14.6 G/DL (ref 12–16)
IMM GRANULOCYTES # BLD: 0 THOU/UL
IMM GRANULOCYTES NFR BLD: 1 %
LYMPHOCYTES # BLD AUTO: 1.6 THOU/UL (ref 0.8–4.4)
LYMPHOCYTES NFR BLD AUTO: 26 % (ref 20–40)
MCH RBC QN AUTO: 37.3 PG (ref 27–34)
MCHC RBC AUTO-ENTMCNC: 33.5 G/DL (ref 32–36)
MCV RBC AUTO: 112 FL (ref 80–100)
MONOCYTES # BLD AUTO: 0.5 THOU/UL (ref 0–0.9)
MONOCYTES NFR BLD AUTO: 8 % (ref 2–10)
NEUTROPHILS # BLD AUTO: 4.1 THOU/UL (ref 2–7.7)
NEUTROPHILS NFR BLD AUTO: 64 % (ref 50–70)
PLATELET # BLD AUTO: 210 THOU/UL (ref 140–440)
PMV BLD AUTO: 9.4 FL (ref 8.5–12.5)
RBC # BLD AUTO: 3.91 MILL/UL (ref 3.8–5.4)
WBC: 6.4 THOU/UL (ref 4–11)

## 2020-10-22 ENCOUNTER — OFFICE VISIT - HEALTHEAST (OUTPATIENT)
Dept: ONCOLOGY | Facility: CLINIC | Age: 80
End: 2020-10-22

## 2020-10-22 DIAGNOSIS — Z79.899 HIGH RISK MEDICATION USE: ICD-10-CM

## 2020-10-22 DIAGNOSIS — D45 POLYCYTHEMIA VERA (H): ICD-10-CM

## 2020-11-02 ENCOUNTER — AMBULATORY - HEALTHEAST (OUTPATIENT)
Dept: ONCOLOGY | Facility: CLINIC | Age: 80
End: 2020-11-02

## 2020-11-02 DIAGNOSIS — D45 POLYCYTHEMIA VERA (H): ICD-10-CM

## 2020-11-30 ENCOUNTER — COMMUNICATION - HEALTHEAST (OUTPATIENT)
Dept: ONCOLOGY | Facility: CLINIC | Age: 80
End: 2020-11-30

## 2020-11-30 DIAGNOSIS — D45 POLYCYTHEMIA VERA (H): ICD-10-CM

## 2020-12-21 ENCOUNTER — AMBULATORY - HEALTHEAST (OUTPATIENT)
Dept: INFUSION THERAPY | Facility: CLINIC | Age: 80
End: 2020-12-21

## 2020-12-21 DIAGNOSIS — Z79.899 HIGH RISK MEDICATION USE: ICD-10-CM

## 2020-12-21 DIAGNOSIS — D45 POLYCYTHEMIA VERA (H): ICD-10-CM

## 2020-12-21 LAB
ALBUMIN SERPL-MCNC: 4 G/DL (ref 3.5–5)
ALP SERPL-CCNC: 65 U/L (ref 45–120)
ALT SERPL W P-5'-P-CCNC: 13 U/L (ref 0–45)
ANION GAP SERPL CALCULATED.3IONS-SCNC: 9 MMOL/L (ref 5–18)
AST SERPL W P-5'-P-CCNC: 20 U/L (ref 0–40)
BASOPHILS # BLD AUTO: 0.1 THOU/UL (ref 0–0.2)
BASOPHILS NFR BLD AUTO: 1 % (ref 0–2)
BILIRUB SERPL-MCNC: 0.5 MG/DL (ref 0–1)
BUN SERPL-MCNC: 12 MG/DL (ref 8–28)
CALCIUM SERPL-MCNC: 8.6 MG/DL (ref 8.5–10.5)
CHLORIDE BLD-SCNC: 109 MMOL/L (ref 98–107)
CO2 SERPL-SCNC: 25 MMOL/L (ref 22–31)
CREAT SERPL-MCNC: 0.76 MG/DL (ref 0.6–1.1)
EOSINOPHIL # BLD AUTO: 0 THOU/UL (ref 0–0.4)
EOSINOPHIL NFR BLD AUTO: 1 % (ref 0–6)
ERYTHROCYTE [DISTWIDTH] IN BLOOD BY AUTOMATED COUNT: 13.1 % (ref 11–14.5)
GFR SERPL CREATININE-BSD FRML MDRD: >60 ML/MIN/1.73M2
GLUCOSE BLD-MCNC: 87 MG/DL (ref 70–125)
HCT VFR BLD AUTO: 45.5 % (ref 35–47)
HGB BLD-MCNC: 14.9 G/DL (ref 12–16)
IMM GRANULOCYTES # BLD: 0 THOU/UL
IMM GRANULOCYTES NFR BLD: 0 %
LYMPHOCYTES # BLD AUTO: 1.3 THOU/UL (ref 0.8–4.4)
LYMPHOCYTES NFR BLD AUTO: 21 % (ref 20–40)
MCH RBC QN AUTO: 36.8 PG (ref 27–34)
MCHC RBC AUTO-ENTMCNC: 32.7 G/DL (ref 32–36)
MCV RBC AUTO: 112 FL (ref 80–100)
MONOCYTES # BLD AUTO: 0.5 THOU/UL (ref 0–0.9)
MONOCYTES NFR BLD AUTO: 8 % (ref 2–10)
NEUTROPHILS # BLD AUTO: 4.4 THOU/UL (ref 2–7.7)
NEUTROPHILS NFR BLD AUTO: 69 % (ref 50–70)
PLATELET # BLD AUTO: 242 THOU/UL (ref 140–440)
PMV BLD AUTO: 9.7 FL (ref 8.5–12.5)
POTASSIUM BLD-SCNC: 3.9 MMOL/L (ref 3.5–5)
PROT SERPL-MCNC: 6.9 G/DL (ref 6–8)
RBC # BLD AUTO: 4.05 MILL/UL (ref 3.8–5.4)
SODIUM SERPL-SCNC: 143 MMOL/L (ref 136–145)
WBC: 6.3 THOU/UL (ref 4–11)

## 2020-12-22 ENCOUNTER — OFFICE VISIT - HEALTHEAST (OUTPATIENT)
Dept: ONCOLOGY | Facility: CLINIC | Age: 80
End: 2020-12-22

## 2020-12-22 DIAGNOSIS — D45 POLYCYTHEMIA VERA (H): ICD-10-CM

## 2021-02-01 ENCOUNTER — COMMUNICATION - HEALTHEAST (OUTPATIENT)
Dept: ONCOLOGY | Facility: CLINIC | Age: 81
End: 2021-02-01

## 2021-02-01 DIAGNOSIS — D45 POLYCYTHEMIA VERA (H): ICD-10-CM

## 2021-02-22 ENCOUNTER — AMBULATORY - HEALTHEAST (OUTPATIENT)
Dept: INFUSION THERAPY | Facility: CLINIC | Age: 81
End: 2021-02-22

## 2021-02-22 DIAGNOSIS — D45 POLYCYTHEMIA VERA (H): ICD-10-CM

## 2021-02-22 LAB
BASOPHILS # BLD AUTO: 0.1 THOU/UL (ref 0–0.2)
BASOPHILS NFR BLD AUTO: 1 % (ref 0–2)
EOSINOPHIL # BLD AUTO: 0.1 THOU/UL (ref 0–0.4)
EOSINOPHIL NFR BLD AUTO: 1 % (ref 0–6)
ERYTHROCYTE [DISTWIDTH] IN BLOOD BY AUTOMATED COUNT: 13.2 % (ref 11–14.5)
HCT VFR BLD AUTO: 42.1 % (ref 35–47)
HGB BLD-MCNC: 14.4 G/DL (ref 12–16)
IMM GRANULOCYTES # BLD: 0 THOU/UL
IMM GRANULOCYTES NFR BLD: 0 %
LYMPHOCYTES # BLD AUTO: 1.6 THOU/UL (ref 0.8–4.4)
LYMPHOCYTES NFR BLD AUTO: 24 % (ref 20–40)
MCH RBC QN AUTO: 38.6 PG (ref 27–34)
MCHC RBC AUTO-ENTMCNC: 34.2 G/DL (ref 32–36)
MCV RBC AUTO: 113 FL (ref 80–100)
MONOCYTES # BLD AUTO: 0.6 THOU/UL (ref 0–0.9)
MONOCYTES NFR BLD AUTO: 9 % (ref 2–10)
NEUTROPHILS # BLD AUTO: 4.4 THOU/UL (ref 2–7.7)
NEUTROPHILS NFR BLD AUTO: 66 % (ref 50–70)
PLATELET # BLD AUTO: 215 THOU/UL (ref 140–440)
PMV BLD AUTO: 9.4 FL (ref 8.5–12.5)
RBC # BLD AUTO: 3.73 MILL/UL (ref 3.8–5.4)
WBC: 6.7 THOU/UL (ref 4–11)

## 2021-02-23 ENCOUNTER — OFFICE VISIT - HEALTHEAST (OUTPATIENT)
Dept: ONCOLOGY | Facility: CLINIC | Age: 81
End: 2021-02-23

## 2021-02-23 DIAGNOSIS — Z79.899 HIGH RISK MEDICATION USE: ICD-10-CM

## 2021-02-23 DIAGNOSIS — D45 POLYCYTHEMIA VERA (H): ICD-10-CM

## 2021-03-04 ENCOUNTER — COMMUNICATION - HEALTHEAST (OUTPATIENT)
Dept: ONCOLOGY | Facility: CLINIC | Age: 81
End: 2021-03-04

## 2021-05-24 ENCOUNTER — AMBULATORY - HEALTHEAST (OUTPATIENT)
Dept: INFUSION THERAPY | Facility: CLINIC | Age: 81
End: 2021-05-24

## 2021-05-24 DIAGNOSIS — Z79.899 HIGH RISK MEDICATION USE: ICD-10-CM

## 2021-05-24 DIAGNOSIS — D45 POLYCYTHEMIA VERA (H): ICD-10-CM

## 2021-05-24 LAB
ALBUMIN SERPL-MCNC: 4 G/DL (ref 3.5–5)
ALP SERPL-CCNC: 62 U/L (ref 45–120)
ALT SERPL W P-5'-P-CCNC: 15 U/L (ref 0–45)
ANION GAP SERPL CALCULATED.3IONS-SCNC: 12 MMOL/L (ref 5–18)
AST SERPL W P-5'-P-CCNC: 21 U/L (ref 0–40)
BASOPHILS # BLD AUTO: 0.1 THOU/UL (ref 0–0.2)
BASOPHILS NFR BLD AUTO: 1 % (ref 0–2)
BILIRUB SERPL-MCNC: 0.5 MG/DL (ref 0–1)
BUN SERPL-MCNC: 14 MG/DL (ref 8–28)
CALCIUM SERPL-MCNC: 8.7 MG/DL (ref 8.5–10.5)
CHLORIDE BLD-SCNC: 107 MMOL/L (ref 98–107)
CO2 SERPL-SCNC: 23 MMOL/L (ref 22–31)
CREAT SERPL-MCNC: 0.77 MG/DL (ref 0.6–1.1)
EOSINOPHIL # BLD AUTO: 0 THOU/UL (ref 0–0.4)
EOSINOPHIL NFR BLD AUTO: 1 % (ref 0–6)
ERYTHROCYTE [DISTWIDTH] IN BLOOD BY AUTOMATED COUNT: 12.8 % (ref 11–14.5)
GFR SERPL CREATININE-BSD FRML MDRD: >60 ML/MIN/1.73M2
GLUCOSE BLD-MCNC: 120 MG/DL (ref 70–125)
HCT VFR BLD AUTO: 42.2 % (ref 35–47)
HGB BLD-MCNC: 14.2 G/DL (ref 12–16)
IMM GRANULOCYTES # BLD: 0 THOU/UL
IMM GRANULOCYTES NFR BLD: 1 %
LYMPHOCYTES # BLD AUTO: 1.5 THOU/UL (ref 0.8–4.4)
LYMPHOCYTES NFR BLD AUTO: 23 % (ref 20–40)
MCH RBC QN AUTO: 37.1 PG (ref 27–34)
MCHC RBC AUTO-ENTMCNC: 33.6 G/DL (ref 32–36)
MCV RBC AUTO: 110 FL (ref 80–100)
MONOCYTES # BLD AUTO: 0.5 THOU/UL (ref 0–0.9)
MONOCYTES NFR BLD AUTO: 8 % (ref 2–10)
NEUTROPHILS # BLD AUTO: 4.3 THOU/UL (ref 2–7.7)
NEUTROPHILS NFR BLD AUTO: 67 % (ref 50–70)
PLATELET # BLD AUTO: 222 THOU/UL (ref 140–440)
PMV BLD AUTO: 9.5 FL (ref 8.5–12.5)
POTASSIUM BLD-SCNC: 4.2 MMOL/L (ref 3.5–5)
PROT SERPL-MCNC: 6.4 G/DL (ref 6–8)
RBC # BLD AUTO: 3.83 MILL/UL (ref 3.8–5.4)
SODIUM SERPL-SCNC: 142 MMOL/L (ref 136–145)
WBC: 6.4 THOU/UL (ref 4–11)

## 2021-05-25 ENCOUNTER — AMBULATORY - HEALTHEAST (OUTPATIENT)
Dept: ONCOLOGY | Facility: CLINIC | Age: 81
End: 2021-05-25

## 2021-05-25 ENCOUNTER — OFFICE VISIT - HEALTHEAST (OUTPATIENT)
Dept: ONCOLOGY | Facility: CLINIC | Age: 81
End: 2021-05-25

## 2021-05-25 DIAGNOSIS — D45 POLYCYTHEMIA VERA (H): ICD-10-CM

## 2021-05-25 DIAGNOSIS — Z79.899 HIGH RISK MEDICATION USE: ICD-10-CM

## 2021-05-25 RX ORDER — HYDROXYUREA 500 MG/1
CAPSULE ORAL
Qty: 105 CAPSULE | Refills: 2 | Status: SHIPPED | OUTPATIENT
Start: 2021-05-25 | End: 2021-08-26

## 2021-05-26 VITALS
HEART RATE: 96 BPM | OXYGEN SATURATION: 98 % | SYSTOLIC BLOOD PRESSURE: 122 MMHG | DIASTOLIC BLOOD PRESSURE: 76 MMHG | TEMPERATURE: 98.4 F

## 2021-05-26 VITALS
HEART RATE: 88 BPM | OXYGEN SATURATION: 97 % | DIASTOLIC BLOOD PRESSURE: 100 MMHG | SYSTOLIC BLOOD PRESSURE: 156 MMHG | TEMPERATURE: 98.5 F

## 2021-05-26 VITALS
TEMPERATURE: 98.3 F | SYSTOLIC BLOOD PRESSURE: 133 MMHG | DIASTOLIC BLOOD PRESSURE: 79 MMHG | HEART RATE: 95 BPM | OXYGEN SATURATION: 96 %

## 2021-05-26 VITALS
DIASTOLIC BLOOD PRESSURE: 74 MMHG | OXYGEN SATURATION: 97 % | SYSTOLIC BLOOD PRESSURE: 132 MMHG | TEMPERATURE: 97.6 F | HEART RATE: 73 BPM

## 2021-05-26 VITALS — SYSTOLIC BLOOD PRESSURE: 138 MMHG | DIASTOLIC BLOOD PRESSURE: 72 MMHG | OXYGEN SATURATION: 98 % | HEART RATE: 86 BPM

## 2021-05-26 ASSESSMENT — PATIENT HEALTH QUESTIONNAIRE - PHQ9: SUM OF ALL RESPONSES TO PHQ QUESTIONS 1-9: 1

## 2021-05-27 VITALS — SYSTOLIC BLOOD PRESSURE: 148 MMHG | DIASTOLIC BLOOD PRESSURE: 78 MMHG

## 2021-05-28 ASSESSMENT — ANXIETY QUESTIONNAIRES: GAD7 TOTAL SCORE: 2

## 2021-05-29 NOTE — PROGRESS NOTES
Pt here today for labs and phlebotomy, she tolerated it well, VSS. She left clinic ambulatory and independent.

## 2021-05-30 VITALS — WEIGHT: 127.7 LBS | HEIGHT: 65 IN | BODY MASS INDEX: 21.27 KG/M2

## 2021-05-30 VITALS — WEIGHT: 128.8 LBS | BODY MASS INDEX: 22.82 KG/M2

## 2021-05-30 VITALS — HEIGHT: 63 IN | BODY MASS INDEX: 22.59 KG/M2 | WEIGHT: 127.5 LBS

## 2021-05-30 VITALS — WEIGHT: 128.7 LBS | BODY MASS INDEX: 22.8 KG/M2

## 2021-05-31 VITALS — WEIGHT: 127.1 LBS | BODY MASS INDEX: 22.51 KG/M2

## 2021-05-31 VITALS — BODY MASS INDEX: 22.78 KG/M2 | WEIGHT: 128.6 LBS

## 2021-05-31 VITALS — HEIGHT: 63 IN | WEIGHT: 127.6 LBS | BODY MASS INDEX: 22.61 KG/M2

## 2021-06-01 ENCOUNTER — RECORDS - HEALTHEAST (OUTPATIENT)
Dept: ADMINISTRATIVE | Facility: CLINIC | Age: 81
End: 2021-06-01

## 2021-06-01 VITALS — BODY MASS INDEX: 22.34 KG/M2 | WEIGHT: 126.1 LBS

## 2021-06-01 VITALS — BODY MASS INDEX: 22.53 KG/M2 | WEIGHT: 127.19 LBS

## 2021-06-01 VITALS — WEIGHT: 124.2 LBS | BODY MASS INDEX: 21.21 KG/M2 | HEIGHT: 64 IN

## 2021-06-01 VITALS — HEIGHT: 64 IN | WEIGHT: 126.1 LBS | BODY MASS INDEX: 21.53 KG/M2

## 2021-06-01 NOTE — PROGRESS NOTES
Livier is here today for ongoing management and tx of polycythemia vera. Today is a 3 month f/u with labs and possible phlebotomy pending labs and OV with Luke Guerrero NP. Christina Maharaj

## 2021-06-01 NOTE — PROGRESS NOTES
[facility] Hematology and Oncology Progress Note    Patient: Livier Bowers  MRN: [id number]  Date of Service: 9/10/2019         Reason for Visit:    Follow up polycythemia (appears secondary).    Assessment and Plan:    1)  Polycythemia, JAK2 and BCR-ABL negative:     78 y.o.     Appears secondary, likely COPD.     2010 - hemoglobin 18 and hematocrit of 53. Serum EPO was low normal. Phlebotomy was recommended, but she declined. Sleep study was unremarkable.  Hydroxyurea was initiated at 500 mg daily; however, ineffective and therefore increased to 500 mg twice daily. She then cut back on her smoking and self manipulated the dose of Hydrea to take the least amount possible.     2013, August - she quit smoking completely, switching to the E cigarette.  Hydrea was further decreased.    2013, December - she was down to Hydrea 500 mg every other day and she opted to discontinue it.    2014, July - hematocrit elevated at 50.7 and hemoglobin 16.8. She would only agree to restarting the Hydrea at 500 mg every other day. Four week follow up in mid-August, 2014, found her Hct responding to low dose Hydrea. Wishing to be on the lowest dose possible of Hydrea she wanted to try every 3rd day Hydrea, 500 mg, as she was able to be off Hydrea therapy for approximately 7 months before her hemoglobin and hematocrit lorenzo to the lever of being concerning and therapy resumed.   2014, September follow up found the Hct and HgB on the rise. She agreed to return to every other day dosing with Hydrea, 500 mg.    2015, January 19 - agreed to try phlebotomy and Hydrea was discontinued.     Prior phlebotomies:    2019 - 01/19, 03/20, 06/12 2018 - 02/08, 04/25, 06/26, 08/29, 11/06 2017 - 01/06, 02/01, 03/08, 04/14, 09/21, 11/02, 12/14.    2016 - 10/28, 10/07, 09/21, 05/24, 02/05, 01/22.    2015 - 10/22, 07/22, 05/29, 05/18, 04/06, 03/09, 01/28.    09/10/19:    Hemoglobin 16.4, hematocrit 50.7.      500 ml phlebotomy today.    Suma missed  her last follow up.  She looks and feels good.  However, is a bit more stressed today than usual.  While she tolerates phlebotomies well, they make her very nervous and she hates the needles.       She's been requiring about 6 phlebotomies/year.  She was turned down as a blood donor due to her diagnosis.  Her Hct is quite very well controlled when doing a phlebotomy every 2 months.  She has only had 3 phlebotomies this year, basically every 3 months.           She has a Rx for 0.5 mg Ativan to try and see if that would help calm her phlebotomy - associated anxieties.  She used it once but afraid she will need a  so chooses not to use.  Encouraged her to talk to her PCP about non-sedating antianxiety medications.    Our goal is to keep Hct 42 or less.      Continue daily aspirin therapy.  She can only tolerate baby aspirin as RS aspirin causes her stomach to feel poorly.  Instructed to take ASA with a meal.    She will only agree to a 500 ml phlebotomy today, and again in one month, then hopefully we can go to, and she will agree to every 2 months/6 phlebotomies per year.    Hct and HgB with each phlebotomy - does not need labs next month.    October 8 phlebotomy - HgB and Hct not needed.    11/05/19 - follow up with HgB and Hct and likely phlebotomies,      ECOG Performance:     ECOG Performance Status: 0    Distress Assessment:    Distress Assessment Score: No distress        TT > 20 minutes face to face with > 50% counseling and care coordination.    Luke Guerrero, CNP     CC: Va Patten MD  ____________________________________________    Interim History:    Suma presents today feeling and looking well.  She missed her last follow up.  She is a bit more stressed today than usual.  While she tolerates phlebotomies well, they make her very nervous and she hates the needles.  Her last phlebotomy was about 12 weeks ago.  Her appetite, weight and performance status remain quite stable.  She denies nausea,  vomiting, cough, fever, chills, headache, visual or mentation disturbance, bowel or bladder issues.  She has now stopped both cigarettes and E cigarettes as well.  She had 7 - 500 ml phlebotomies in 2015, 6 in 2016, 7 in 2017 and 5 in 2018, only 3 so far this year.    Pain Status:    Currently in Pain: No/denies    Review of Systems:    Constitutional  Constitutional (WDL): All constitutional elements are within defined limits  Neurosensory  Neurosensory (WDL): Exceptions to WDL  Peripheral Motor Neuropathy: Asymptomatic, clinical or diagnostic observations only, intervention not indicated  Ataxia: Asymptomatic, clinical or diagnostic observations only, intervention not indicated  Peripheral Sensory Neuropathy: Asymptomatic, loss of deep tendon reflexes or paresthesia(LLE - chronic; intermittent.)  Cardiovascular  Cardiovascular (WDL): All cardiovascular elements are within defined limits  Pulmonary  Respiratory (WDL): Within Defined Limits  Gastrointestinal  Gastrointestinal (WDL): All gastrointestinal elements are within defined limits  Genitourinary  Genitourinary (WDL): All genitourinary elements are within defined limits  Integumentary  Integumentary (WDL): All integumentary elements are within defined limits  Patient Coping  Patient Coping: Open/discussion  Distress Assessment  Distress Assessment Score: No distress  Accompanied by  Accompanied by: Alone    Past Histories:    Past Medical History:   Diagnosis Date     Osteoporosis      Parathyroid adenoma      Polycythemia vera (H)      Vitamin D deficiency          Past Surgical History:   Procedure Laterality Date     CATARACT EXTRACTION Left      DILATION AND CURETTAGE OF UTERUS       KNEE SURGERY      torn meniscus     THYROIDECTOMY, PARTIAL N/A 12/2/2014    Procedure: RIGHT PARATHYROID EXCISION ;  Surgeon: Tressa Louis MD;  Location: [facility];  Service:        Physical Exam:    Recent Vitals 9/10/2019   Weight -   BSA (m2) -   BP  156/100   Pulse 88   Temp 98.5   Temp src 1   SpO2 97   Some recent data might be hidden     General:  Alert and oriented.  Very pleasant.  Anxious knowing this was a phlebotomy day.     HEENT:  Anicteric sclera. EOMI. OJ. No oral lesions.  Extremities:  No edema  Skin:    No rash noted  Chest:  Lungs clear.  CVS:  S1S2.  RRR.  No murmur heard  Lymph nodes: No palpable adenopathy neck, axilla or groin.    Lab Results:    Reviewed with patient.    Recent Results (from the past 24 hour(s))   Hemoglobin   Result Value Ref Range    Hemoglobin 16.4 (H) 12.0 - 16.0 g/dL   Hematocrit   Result Value Ref Range    Hematocrit 50.7 (H) 35.0 - 47.0 %       Imaging:    No results found.

## 2021-06-01 NOTE — PATIENT INSTRUCTIONS - HE
Recent Results (from the past 24 hour(s))   Hemoglobin   Result Value Ref Range    Hemoglobin 16.4 (H) 12.0 - 16.0 g/dL   Hematocrit   Result Value Ref Range    Hematocrit 50.7 (H) 35.0 - 47.0 %

## 2021-06-02 ENCOUNTER — RECORDS - HEALTHEAST (OUTPATIENT)
Dept: ADMINISTRATIVE | Facility: CLINIC | Age: 81
End: 2021-06-02

## 2021-06-02 VITALS — WEIGHT: 125.4 LBS | BODY MASS INDEX: 21.76 KG/M2

## 2021-06-02 NOTE — PROGRESS NOTES
Patient was anxious during the IV cathetrer stick but calmed down afterwards and tolerated phlebotomy well. VSS. Enc. Increased fluids the rest of today. Pt. Verbalizes understanding.

## 2021-06-03 VITALS
HEART RATE: 98 BPM | SYSTOLIC BLOOD PRESSURE: 138 MMHG | BODY MASS INDEX: 21.85 KG/M2 | DIASTOLIC BLOOD PRESSURE: 80 MMHG | HEIGHT: 63 IN | WEIGHT: 123.3 LBS

## 2021-06-03 NOTE — PROGRESS NOTES
Suma is here today for ongoing management of polycythemia. Today is a 2 month f/u with labs, OV with Luke Guerrero NP and possible phlebotomy. Christina Maharaj

## 2021-06-03 NOTE — PROGRESS NOTES
[facility] Hematology and Oncology Progress Note    Patient: Livier Bowers  MRN: [id number]  Date of Service: 11/5/2019         Reason for Visit:    Follow up polycythemia (appears secondary).    Assessment and Plan:    1)  Polycythemia, JAK2 and BCR-ABL negative:     78 y.o.     Appears secondary, likely COPD.     2010 - hemoglobin 18 and hematocrit of 53. Serum EPO was low normal. Phlebotomy was recommended, but she declined. Sleep study was unremarkable.  Hydroxyurea was initiated at 500 mg daily; however, ineffective and therefore increased to 500 mg twice daily. She then cut back on her smoking and self manipulated the dose of Hydrea to take the least amount possible.     2013, August - she quit smoking completely, switching to the E cigarette.  Hydrea was further decreased.    2013, December - she was down to Hydrea 500 mg every other day and she opted to discontinue it.    2014, July - hematocrit elevated at 50.7 and hemoglobin 16.8. She would only agree to restarting the Hydrea at 500 mg every other day. Four week follow up in mid-August, 2014, found her Hct responding to low dose Hydrea. Wishing to be on the lowest dose possible of Hydrea she wanted to try every 3rd day Hydrea, 500 mg, as she was able to be off Hydrea therapy for approximately 7 months before her hemoglobin and hematocrit lorenzo to the lever of being concerning and therapy resumed.   2014, September follow up found the Hct and HgB on the rise. She agreed to return to every other day dosing with Hydrea, 500 mg.    2015, January 19 - agreed to try phlebotomy and Hydrea was discontinued.     Prior phlebotomies:    2019 - 01/19, 03/20, 06/12, 09/10, 10/08.    2018 - 02/08, 04/25, 06/26, 08/29, 11/06.    2017 - 01/06, 02/01, 03/08, 04/14, 09/21, 11/02, 12/14.    2016 - 10/28, 10/07, 09/21, 05/24, 02/05, 01/22.    2015 - 10/22, 07/22, 05/29, 05/18, 04/06, 03/09, 01/28.    11/05/19:    Hemoglobin 14.4, hematocrit 44.9.      500 ml phlebotomy  today.    Suma looks and feels good.  However, is again stressed today due to fear of needing another phlebotomy.  While she tolerates phlebotomies well, they make her very nervous and she hates the needles.  Today we arranged to see her back in infusion with labs through a phlebotomy needle so she only needs to be poked once.      She's been requiring about 6 phlebotomies/year.  Her Hct is well controlled when doing a phlebotomy every 2 months.             She has a Rx for 0.5 mg Ativan to try and see if that would help calm her phlebotomy - associated anxieties.  She used it once but afraid she will need a  so chooses not to use.  Encouraged her to talk to her PCP about non-sedating antianxiety medications.    She has agreed to be diligent with Hct checks every 2 months with a possible 500 ml phlebotomy.    Phlebotomize to keep Hct < 42.      Continue daily aspirin therapy.  She can only tolerate baby aspirin as RS aspirin causes her stomach to feel poorly.  Instructed to take ASA with a meal.    Hct and HgB with each phlebotomy per Therapy Plan.    03/05/20 - follow up with HgB and Hct and potential phlebotomy      ECOG Performance:     ECOG Performance Status: 0    Distress Assessment:             TT > 20 minutes face to face with > 50% counseling and care coordination.    Luke Guerrero, CNP     CC: Va Patten MD  ____________________________________________    Interim History:    Suma presents today feeling and looking well.  While she tolerates phlebotomies well, they make her very nervous and she hates the needles causing much anxiety at her appointments.  Her appetite, weight and performance status remain quite stable.  She denies nausea, vomiting, cough, fever, chills, headache, visual or mentation disturbance, bowel or bladder issues.  She has stopped both cigarettes and E cigarettes as well.  She had 7 - 500 ml phlebotomies in 2015, 6 in 2016, 7 in 2017, 5 in 2018 and 5 so far this year,  2019.    Pain Status:    Currently in Pain: No/denies    Review of Systems:    Constitutional  Constitutional (WDL): All constitutional elements are within defined limits  Neurosensory  Neurosensory (WDL): Exceptions to WDL  Peripheral Motor Neuropathy: Asymptomatic, clinical or diagnostic observations only, intervention not indicated  Ataxia: Asymptomatic, clinical or diagnostic observations only, intervention not indicated  Peripheral Sensory Neuropathy: Asymptomatic, loss of deep tendon reflexes or paresthesia  Cardiovascular  Cardiovascular (WDL): All cardiovascular elements are within defined limits  Pulmonary  Respiratory (WDL): Within Defined Limits  Gastrointestinal  Gastrointestinal (WDL): All gastrointestinal elements are within defined limits  Genitourinary  Genitourinary (WDL): All genitourinary elements are within defined limits  Integumentary  Integumentary (WDL): All integumentary elements are within defined limits  Patient Coping  Patient Coping: Accepting;Anxiety  Distress Assessment     Accompanied by  Accompanied by: Alone    Past Histories:    Past Medical History:   Diagnosis Date     Osteoporosis      Parathyroid adenoma      Polycythemia vera (H)      Vitamin D deficiency          Past Surgical History:   Procedure Laterality Date     CATARACT EXTRACTION Left      DILATION AND CURETTAGE OF UTERUS       KNEE SURGERY      torn meniscus     THYROIDECTOMY, PARTIAL N/A 12/2/2014    Procedure: RIGHT PARATHYROID EXCISION ;  Surgeon: Tressa Louis MD;  Location: [facility];  Service:        Physical Exam:    Recent Vitals 11/5/2019   /79   Pulse 95   Temp 98.3   Temp src 1   SpO2 96   Some recent data might be hidden     General:  Alert and oriented.  Very pleasant but anxious.  Anxious knowing this was a phlebotomy day.     HEENT:  Anicteric sclera. EOMI. OJ. No oral lesions.  Extremities:  No edema  Skin:    No rash noted  Chest:  Lungs clear.  CVS:  S1S2.  RRR.  No  murmur heard  Lymph nodes: No palpable adenopathy neck, axilla or groin.    Lab Results:    Reviewed with patient.    Recent Results (from the past 24 hour(s))   Hemoglobin   Result Value Ref Range    Hemoglobin 14.4 12.0 - 16.0 g/dL   Hematocrit   Result Value Ref Range    Hematocrit 44.9 35.0 - 47.0 %       Imaging:    No results found.

## 2021-06-03 NOTE — PROGRESS NOTES
Assessment/Plan      Annual Wellness Visit      Livier was seen today for annual wellness visit.    Diagnoses and all orders for this visit:    Encounter for general adult medical examination with abnormal findings    Postmenopausal Osteoporosis  -     DXA Bone Density Scan; Future  -     ibandronate (BONIVA) 150 mg tablet; Take 1 tablet (150 mg total) by mouth every 30 (thirty) days. Take in AM with glass of water prior to food, don't lie down for 30 minutes.  -     Ambulatory referral to Osteoporosis Care    Polycythemia vera (H)    Chronic left-sided low back pain with left-sided sciatica  -     XR Lumbar Spine W Flex and Ext 6 or More VWS; Future  -     Ambulatory referral to Spine Care    Screening for metabolic disorder  -     Lipid Cascade  -     Glucose; Future  -     Glucose    Adenoma Of The Parathyroid Gland  Will recheck the levels was high prior the surgery 4 yr ago , but normal since then , but with osteoporosis worsening making sure no metabolic cause   -     Parathyroid Hormone Intact  -     Calcium    NAGA (generalized anxiety disorder)    Palpitations    EKG normal sinus rhythm with few PVC  -     Electrocardiogram Perform - Clinic    PVC's (premature ventricular contractions)   reassure they are non threatening preventive and  treatment options discussed       Seborrheic dermatitis of scalp  New concern at the end of the visit , very flaky itching skin , trying OTC med not much help   -     clobetasol (TEMOVATE) 0.05 % external solution; Apply to affected area daily  -     ketoconazole (NIZORAL) 2 % shampoo; Apply to damp skin, lather, leave on 5 minutes, and rinse 3 times per wk    Refuse all vaccination   Patient left before Xray could be done   Will let spine care do the appropriate imaging     Subjective:      Livier Bowers is a 78 y.o. female who presents for a Subsequent Annual Wellness Visit.  Today we discussed new concern for ongoing back pain     Back Pain: Patient presents for presents  evaluation of low back problems.  Symptoms have been present for several years and include pain in left SI joint  (sharp and shooting in character; 6/10 in severity), stiffness in lower back and referred pain to left hip and back of the leg  and weakness in left leg . Initial inciting event: none. Symptoms are worst: afternoon, evening. Alleviating factors identifiable by patient are recumbency and sitting. Exacerbating factors identifiable by patient are bending forwards, walking and walking uphill. Treatments so far initiated by patient: none Previous lower back problems: yes chronic . Previous workup: none. Previous treatments: physical therapy. Trying to do stretches at home , plan to do xray today     Palpitations: Patient complains of palpitations and rapid heart beat.  The symptoms are of mild severity, occuring intermittently and lasting a few minutes per episode. Cardiac risk factors include: advanced age (older than 55 for men, 65 for women), dyslipidemia and sedentary lifestyle. Aggravating factors: caffeine, stress/anxiety. Relieving factors: spontaneous. Associated signs and symptoms: none      plycythemia vera : follow oncologist . phlebotimy every 3 month , stopped smoking 2 yr ago still vap    Osteoporosis : currently only taking vit d and calcium should be on bisphosphonate , tried fosamax only for 2 month didn't like the side effects so stopped . Will do trial of Boniva again and referral to osteoporosis care center       Healthy Habits:   Regular Exercise: No  Sunscreen Use: No  Healthy Diet: Yes  Dental Visits Regularly: Yes  Seat Belt: Yes  cologuard  Yes  Colonoscopy: N/A  Lipid Profile: Yes  Glucose Screen: Yes  Prevention of Osteoporosis: Yes  Last Dexa: Yes  Guns at Home:  N/A      Immunization History   Administered Date(s) Administered     Pneumo Polysac 23-V 02/25/2000     Td,adult,historic,unspecified 02/04/2010       Immunization status: up to date and documented.    Current Outpatient  Medications   Medication Sig Dispense Refill     CALCIUM ORAL Take by mouth.       cholecalciferol, vitamin D3, 1,000 unit (25 mcg) tablet Take 1 tablet by mouth daily.              clobetasol (TEMOVATE) 0.05 % external solution Apply to affected area daily 50 mL 3     ibandronate (BONIVA) 150 mg tablet Take 1 tablet (150 mg total) by mouth every 30 (thirty) days. Take in AM with glass of water prior to food, don't lie down for 30 minutes. 3 tablet 4     ketoconazole (NIZORAL) 2 % shampoo Apply to damp skin, lather, leave on 5 minutes, and rinse 3 times per wk 120 mL 0     No current facility-administered medications for this visit.      Past Medical History:   Diagnosis Date     Osteoporosis      Parathyroid adenoma      Polycythemia vera (H)      Vitamin D deficiency      Past Surgical History:   Procedure Laterality Date     CATARACT EXTRACTION Left      DILATION AND CURETTAGE OF UTERUS       KNEE SURGERY      torn meniscus     THYROIDECTOMY, PARTIAL N/A 12/2/2014    Procedure: RIGHT PARATHYROID EXCISION ;  Surgeon: Tressa Louis MD;  Location: [facility];  Service:        Patient has no known allergies.    Family History   Problem Relation Age of Onset     Cirrhosis Mother      Lung disease Father         Mesothelioma     Heart disease Maternal Grandfather      Colon cancer Paternal Grandmother      Heart attack Paternal Grandfather        Social History     Socioeconomic History     Marital status:      Spouse name: Not on file     Number of children: Not on file     Years of education: Not on file     Highest education level: Not on file   Occupational History     Not on file   Social Needs     Financial resource strain: Not on file     Food insecurity:     Worry: Not on file     Inability: Not on file     Transportation needs:     Medical: Not on file     Non-medical: Not on file   Tobacco Use     Smoking status: Former Smoker     Last attempt to quit: 2/1/2014     Years since  "quittin.7     Smokeless tobacco: Current User     Tobacco comment: E-cigs  10/day   Substance and Sexual Activity     Alcohol use: Yes     Alcohol/week: 0.0 standard drinks     Types: 2 - 3 Glasses of wine per week     Drug use: No     Sexual activity: Never   Lifestyle     Physical activity:     Days per week: Not on file     Minutes per session: Not on file     Stress: Not on file   Relationships     Social connections:     Talks on phone: Not on file     Gets together: Not on file     Attends Anabaptist service: Not on file     Active member of club or organization: Not on file     Attends meetings of clubs or organizations: Not on file     Relationship status: Not on file     Intimate partner violence:     Fear of current or ex partner: Not on file     Emotionally abused: Not on file     Physically abused: Not on file     Forced sexual activity: Not on file   Other Topics Concern     Not on file   Social History Narrative     Not on file       Social History     Patient does not qualify to have social determinant information on file (likely too young).   Social History Narrative     Not on file       Current Diet:  limited junk food    Functional Ability/Level of Safety  Fall Risk Factors:  previous fall and deconditioning  Home Safety Risk Factors: loose rugs    Advanced Directive:  I have had an Advanced Directive discussion with the patient.    Co-Managers and Medical Equipment/Suppliers:  None     Review of Systems  A 12 point comprehensive review of systems was negative except as noted.    Vitals:    19 1126 19 1209   BP: 140/84 138/80   Patient Site: Left Arm Left Arm   Patient Position: Sitting Sitting   Cuff Size: Adult Regular Adult Regular   Pulse: 98    Weight: 123 lb 4.8 oz (55.9 kg)    Height: 5' 3\" (1.6 m)          General: Alert, no acute distress.   HEENT: normocephalic conjunctivae are clear, Normal pearly TMs bilaterally without erythema, pus or fluid.   Nose is clear.  " Oropharynx is moist and clear,   Neck: supple without adenopathy or thyromegaly.  Lungs: Good aeration bilaterally. Clear to auscultation without wheezes, rales or rhonci.  Heart: regular rate and rhythm, normal S1 and S2, no murmurs few PVC's   Abdomen: soft and nontender, bowel sounds are present, no hepatosplenomegaly or mass palpable.  Skin: clear without rash or lesions  Neuro: alert, interactive moving all extremities equally, normal muscle tone in all 4 extremities, deep tendon reflexes 2+ symmetrically at the patella    The following low BMI interventions were performed this visit: dietary management education, guidance, and counseling    EKG : not done     No flowsheet data found.  A Mini Cog score of 0-2 suggests the possibility of dementia, score of 3-5 suggests no dementia    Identified Health Risks:           Va Patten MD 11/12/2019 11:19 AM                She is at risk for falling and has been provided with information to reduce the risk of falling at home.

## 2021-06-03 NOTE — PATIENT INSTRUCTIONS - HE
Recent Results (from the past 24 hour(s))   Hemoglobin   Result Value Ref Range    Hemoglobin 14.4 12.0 - 16.0 g/dL   Hematocrit   Result Value Ref Range    Hematocrit 44.9 35.0 - 47.0 %

## 2021-06-04 VITALS
SYSTOLIC BLOOD PRESSURE: 147 MMHG | BODY MASS INDEX: 22.32 KG/M2 | WEIGHT: 126 LBS | HEART RATE: 88 BPM | TEMPERATURE: 98.4 F | DIASTOLIC BLOOD PRESSURE: 65 MMHG | OXYGEN SATURATION: 97 %

## 2021-06-04 VITALS
SYSTOLIC BLOOD PRESSURE: 138 MMHG | WEIGHT: 125.2 LBS | DIASTOLIC BLOOD PRESSURE: 71 MMHG | OXYGEN SATURATION: 95 % | TEMPERATURE: 98.6 F | HEART RATE: 107 BPM | BODY MASS INDEX: 22.18 KG/M2

## 2021-06-04 VITALS
TEMPERATURE: 98 F | SYSTOLIC BLOOD PRESSURE: 141 MMHG | WEIGHT: 122.8 LBS | OXYGEN SATURATION: 95 % | BODY MASS INDEX: 21.75 KG/M2 | HEART RATE: 96 BPM | DIASTOLIC BLOOD PRESSURE: 73 MMHG

## 2021-06-04 VITALS
BODY MASS INDEX: 21.79 KG/M2 | HEIGHT: 63 IN | WEIGHT: 123 LBS | SYSTOLIC BLOOD PRESSURE: 137 MMHG | DIASTOLIC BLOOD PRESSURE: 85 MMHG | HEART RATE: 85 BPM

## 2021-06-05 VITALS
WEIGHT: 126.6 LBS | BODY MASS INDEX: 22.43 KG/M2 | DIASTOLIC BLOOD PRESSURE: 77 MMHG | TEMPERATURE: 98.4 F | SYSTOLIC BLOOD PRESSURE: 157 MMHG | OXYGEN SATURATION: 96 %

## 2021-06-05 NOTE — PROGRESS NOTES
Pt came into infusion clinic for labs and poss Phlebotomy. Phlebotomy indicated based on labs. Pt tolerated this well. Pt monitored post Phlebotomy with no issues. VSS. Pt left infusion clinic via ambulatroy and will RTC as sched.

## 2021-06-05 NOTE — PROGRESS NOTES
ASSESSMENT: Livier Bowers is a 79 y.o. female who presents for consultation at the request of PCP Va Patten MD, with a past medical history significant for polycythemia vera, vitamin D deficiency, parathyroid gland adenoma, osteoporosis-currently on Boniva, right shoulder rotator cuff tear, anxiety who presents today for new patient evaluation of:    -Chronic left low back pain at the beltline with left lumbar radiculitis L5 and S1 dermatomal pattern.  2017 MRI does show L4-5 spondylolisthesis with advanced facet arthropathy and left greater than right foraminal stenosis, recent flexion-extension x-ray shows no instability.    Patient is neurologically intact on exam. No myelopathic or red flag symptoms.     JOMAR Score: 18    WHO 5: 14     Diagnoses and all orders for this visit:    Chronic left-sided low back pain with left-sided sciatica  -     Ambulatory referral to PT/OT  -     gabapentin (NEURONTIN) 100 MG capsule; Take 100 mg by mouth 3 (three) times a day as needed.  Dispense: 60 capsule; Refill: 3    Spondylolisthesis of lumbar region  -     Ambulatory referral to PT/OT    DDD (degenerative disc disease), lumbar  -     Ambulatory referral to PT/OT    Lumbar facet arthropathy  -     Ambulatory referral to PT/OT    Left lumbar radiculitis  -     Ambulatory referral to PT/OT  -     gabapentin (NEURONTIN) 100 MG capsule; Take 100 mg by mouth 3 (three) times a day as needed.  Dispense: 60 capsule; Refill: 3      PLAN:  Reviewed spine anatomy and disease process. Discussed diagnosis and treatment options with the patient today. A shared decision making model was used.  The patient's values and choices were respected. The following represents what was discussed and decided upon by the provider and the patient.      -DIAGNOSTIC TESTS:  Images were personally reviewed and interpreted and explained to patient today using spine model.   --Discussed with patient that if her pain worsens or she develops left  weakness at any time I would recommended an updated lumbar spine MRI however she prefers to trial conservative treatments first therefore we can hold off as she is neurologically intact on exam.  --Lumbar spine flexion-extension x-ray 12/5/2020 shows L4-5 spondylolisthesis, no instability noted.  Thoracolumbar levoscoliosis.  --Lumbar spine MRI 2017 [facility] shows L4-5 6 mm spondylolisthesis with advanced facet arthropathy with moderate right and mild left subarticular stenosis and left greater than right foraminal stenosis.  L4-5 mild to moderate foraminal stenosis with left L4 nerve root compression.    -PHYSICAL THERAPY: Referral to physical therapy [facility] to establish home exercise for core strengthening and nerve glides.  Discussed the importance of core strengthening, ROM, stretching exercises with the patient and how each of these entities is important in decreasing pain.  Explained to the patient that the purpose of physical therapy is to teach the patient a home exercise program.  These exercises need to be performed every day in order to decrease pain and prevent future occurrences of pain.        -MEDICATIONS: Prescribed gabapentin 100 mg that she can take 1 tablet 3 times daily, at this time she prefers not to take this medication on a regular basis however we did discuss that it typically is more effective taking it on a regular basis.  -Did discuss prescription NSAID as well as an option potentially meloxicam however she wants to hold off on this medication.  Discussed multiple medication options today with patient. Discussed risks, side effects, and proper use of medications. Patient verbalized understanding.    -INTERVENTIONS: We did discuss the potential for left lumbar JANES's for her leg pain versus facet joint injection options for her back pain however she prefers to hold off and trial physical therapy first.  Did discuss if we wanted to trial injections I would recommend an updated lumbar  spine MRI.  Discussed risks and benefits of injections with patient today.    -PATIENT EDUCATION:  45 minutes of total visit time was spent face to face with the patient today, greater than 50% of total time spent with patient was spent on counseling, education, and coordinating care.   -10 minutes spent outside of visit time, non-face-to-face time, reviewing chart.    -FOLLOW-UP:   Follow-up if symptoms or not improving with physical therapy or worsen at any time.    Advised patient to call the [facility] if symptoms worsen or you have problems controlling bladder and bowel function.   ______________________________________________________________________    SUBJECTIVE:  HPI:  Livier Bowers  Is a 79 y.o. female who presents today for new patient evaluation of low back pain at the lumbosacral junction and beltline on the left that does radiate to left posterior buttock, lateral hip as well as posterior lateral thigh and posterior lateral calf with associated numbness and tingling on the left leg only that is been ongoing for many years and progressively worsening, currently her pain however is doing pretty well today at a 0/10 but she does report that it gets up to an 8/10 at its worst typically with standing or walking for more than 1 block, vacuum as well as standing in one spot for long periods of time.  Patient again denies right leg symptoms, denies recent trips or falls or balance changes, denies lower extremity weakness, denies bowel or bladder loss control, denies saddle anesthesia.    -Treatment to Date: No prior spinal surgery.  Physical therapy [facility] with benefit, does a few of the exercises on a regular basis.    -Medications:  Infrequent ibuprofen with minimal benefit.    Current Outpatient Medications on File Prior to Visit   Medication Sig Dispense Refill     CALCIUM ORAL Take by mouth.       cholecalciferol, vitamin D3, 1,000 unit (25 mcg) tablet Take 1 tablet by mouth daily.               clobetasol (TEMOVATE) 0.05 % external solution Apply to affected area daily 50 mL 3     ibandronate (BONIVA) 150 mg tablet Take 1 tablet (150 mg total) by mouth every 30 (thirty) days. Take in AM with glass of water prior to food, don't lie down for 30 minutes. 3 tablet 4     No current facility-administered medications on file prior to visit.        No Known Allergies    Past Medical History:   Diagnosis Date     Osteoporosis      Parathyroid adenoma      Polycythemia vera (H)      Vitamin D deficiency         Patient Active Problem List   Diagnosis     Polycythemia vera (H)     Joint Pain, Localized In The Hip     Pain In The Arms     Vitamin D Deficiency     Adenoma Of The Parathyroid Gland     Osteoporosis     Postmenopausal Osteoporosis     Rotator cuff tear arthropathy of right shoulder     Anxiety       Past Surgical History:   Procedure Laterality Date     CATARACT EXTRACTION Left      DILATION AND CURETTAGE OF UTERUS       KNEE SURGERY      torn meniscus     THYROIDECTOMY, PARTIAL N/A 12/2/2014    Procedure: RIGHT PARATHYROID EXCISION ;  Surgeon: Tressa Louis MD;  Location: [facility];  Service:        Family History   Problem Relation Age of Onset     Cirrhosis Mother      Lung disease Father         Mesothelioma     Heart disease Maternal Grandfather      Colon cancer Paternal Grandmother      Heart attack Paternal Grandfather        Reviewed past medical, surgical, and family history with patient found on new patient intake packet located in [employer] tab.     SOCIAL HX: Patient is  and retired, does stretch and do exercises on a regular basis as well as does work out at [employer] on occasions.  Patient denies smoking at this time but she did smoke for many years, quit about 3 years ago.  Patient reports occasional alcohol use however denies history being a heavy drinker, denies recreational drug use.    ROS: Positive for joint pain, muscle pain, muscle fatigue, sciatica,  "changes in vision, anxiety.  Specifically negative for bowel/bladder dysfunction, balance changes, headache, dizziness, foot drop, fevers, chills, appetite changes, nausea/vomiting, unexplained weight loss. Otherwise 13 systems reviewed are negative. Please see the patient's intake questionnaire from today for details.    OBJECTIVE:  /85 (Patient Site: Right Arm, Patient Position: Sitting, Cuff Size: Adult Regular)   Pulse 85   Ht 5' 3\" (1.6 m)   Wt 123 lb (55.8 kg)   BMI 21.79 kg/m      PHYSICAL EXAMINATION:    --CONSTITUTIONAL:  Vital signs as above.  No acute distress.  The patient is well nourished and well groomed.  --PSYCHIATRIC:  Appropriate mood and affect. The patient is awake, alert, oriented to person, place, time and answering questions appropriately with clear speech.    --SKIN:  Skin over the face, bilateral lower extremities, and posterior torso is clean, dry, intact without rashes.    --RESPIRATORY: Normal rhythm and effort. No abnormal accessory muscle breathing patterns noted.   --ABDOMINAL:  Soft, non-distended, non-tender throughout all quadrants.   --STANDING EXAMINATION:  Normal lumbar lordosis noted, no lateral shift.  --MUSCULOSKELETAL: Lumbar spine inspection reveals no evidence of deformity. Range of motion is not limited in lumbar flexion, some increased pain with lumbar extension as well as extension and lateral rotation simultaneously to the left. No tenderness to palpation lumbar spine. Straight leg raising in the seated position is negative to radicular pain. Sciatic notch non-tender.  --SACROILIAC JOINT: One Finger point test negative.  --GROSS MOTOR: Gait is non-antalgic. Easily arises from a seated position.   --LOWER EXTREMITY MOTOR TESTING:  Plantar flexion left 5/5, right 5/5   Dorsiflexion left 5/5, right 5/5   Great toe MTP extension left 5/5, right 5/5  Knee flexion left 5/5, right 5/5  Knee extension left 5/5, right 5/5   Hip flexion left 5/5, right 5/5  Hip " abduction left 5/5, right 5/5  Hip adduction left 5/5, right 5/5   --HIPS: Full range of motion bilaterally. Negative FABERs on the involved lower extremity.   --NEUROLOGICAL:  2/4 patellar, medial hamstring, and achilles reflexes bilaterally.  Sensation to light touch is intact in the bilateral L4, L5, and S1 dermatomes. Babinski is negative. No clonus.  Negative Marta reflex bilaterally.  --VASCULAR:  2/4 dorsalis pedis and posterior tibialsi pulses bilaterally.  Bilateral lower extremities are warm.  There is no pitting edema of the bilateral lower extremities.    RESULTS: Prior medical records from [facility] 1/9/2019 to current and care everywhere were reviewed today.    Imaging: Lumbar spine Imaging was personally reviewed and interpreted today. The images were shown to the patient and the findings were explained using a spine model.      XR LUMBAR SPINE W FLEX AND EXT 6 OR MORE VWS  LOCATION: [facility]  DATE/TIME: 12/5/2019   INDICATION: History of back pain.  COMPARISON: None available in PACS  IMPRESSION:   Severe osteopenia. No definite acute displaced fracture. CT could be considered if high clinical suspicion for traumatic injury.  Grade 1 anterolisthesis L4 on L5. Levoscoliosis thoracolumbar spine. Vertebral body heights and alignment otherwise maintained. No substantial change on flexion or extension views. Multilevel degenerative disc disease with disc space narrowing and   endplate osteophytosis. Multilevel facet arthropathy.  Degenerative changes of the partially imaged hip joints bilaterally. Severe atherosclerotic aortic calcifications.       Lumbar spine MRI  [facility]-10/26/2017  Conclusion: Multilevel lumbar spondylosis and lordotic alignment with the following specific findings:  1.  L4-5 grade 1 degenerative spondylolisthesis with abutment of the undersurface of the left conjoined L4/5 nerve root.  2.  L3-4 left-sided disc and facet degeneration contributing to  descending left L4 nerve encroachment.  3.  L2-3 grade 1 degenerative spondylolisthesis with mild foraminal stenosis.  4.  L5-S1 right posterior lateral and foraminal high intensity annular fissure contributing to mild right subarticular stenosis with the conjoined right L5-S1 nerve root.  5.  Marked colonic diverticulosis.

## 2021-06-06 NOTE — PATIENT INSTRUCTIONS - HE
Recent Results (from the past 24 hour(s))   Hemoglobin   Result Value Ref Range    Hemoglobin 14.1 12.0 - 16.0 g/dL   Hematocrit   Result Value Ref Range    Hematocrit 44.7 35.0 - 47.0 %

## 2021-06-06 NOTE — TELEPHONE ENCOUNTER
Discussed with Luke. Iron studies added on to labs from 3/10. Pending. Patient prefers [employer] for updates. Will watch for results. If there is any change to current plan, she prefers to be called on Thursday or Friday. If no change to plan, she said no need to call she will get results on [employer]. Christina Maharaj

## 2021-06-06 NOTE — PROGRESS NOTES
[facility] Discharge Summary  Patient Name: Livier Bowers  Date: 3/10/2020  Referral Diagnosis: Chronic left-sided low back pain with left-sided sciatica  Referring provider: Lise Francisco C*  Visit Diagnosis:   1. Chronic left-sided low back pain with left-sided sciatica     2. Spondylolisthesis of lumbar region     3. DDD (degenerative disc disease), lumbar     4. Lumbar facet arthropathy     5. Left lumbar radiculitis         Goals:  Pt. will demonstrate/verbalize independence in self-management of condition in : 6 weeks  Pt. will be independent with home exercise program in : 6 weeks  Pt. will improve posture : and demonstrate posture with minimal to no cuing;in 6 weeks  Pt. will be able to walk : 10 minutes;on even surfaces;with less difficulty;for household mobility;in 6 weeks;with less pain  Patient will stand : 30 minutes;with less pain;with less difficultty;for home chores;in 6 weeks  Patient will ascend / descend: stairs;with less pain;with less difficulty;in 6 weeks    No data recorded    Patient was seen for 1 visits physical therapy.    The patient attended therapy initially, but did not finish the therapy sessions prescribed.  Goals were not fully achieved. Explanation for goals not achieved: The patient discontinued therapy, did not return.    Therapy will be discontinued at this time.  Please see progress note dated 1/29/20 for patient status.      Thank you for your referral.  Armando Ortiz, PT  3/10/2020  11:03 AM

## 2021-06-06 NOTE — TELEPHONE ENCOUNTER
Livier calling to inquire about her ferritin from 3/9. Resulted as: Ferritin 6. Will discuss with Luke Guerrero NP and return call.Christina Maharaj

## 2021-06-06 NOTE — PROGRESS NOTES
[facility] Hematology and Oncology Progress Note    Patient: Livier Bowers  MRN: [id number]  Date of Service: 3/9/2020         Reason for Visit:    Follow up polycythemia.    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     Initially appeared secondary, likely to COPD.     2010 - hemoglobin 18 and hematocrit of 53. Serum EPO was low normal. Phlebotomy was recommended, but she declined. Sleep study was unremarkable.  Hydroxyurea was initiated at 500 mg daily; however, ineffective and therefore increased to 500 mg twice daily. She then cut back on her smoking and self manipulated the dose of Hydrea to take the least amount possible.     2013, August - she quit smoking completely, switching to the E cigarette.  Hydrea was further decreased.    2013, December - she was down to Hydrea 500 mg every other day and she opted to discontinue it.    2014, July - hematocrit elevated at 50.7 and hemoglobin 16.8. She would only agree to restarting the Hydrea at 500 mg every other day. Four week follow up in mid-August, 2014, found her Hct responding to low dose Hydrea. Wishing to be on the lowest dose possible of Hydrea she wanted to try every 3rd day Hydrea, 500 mg, as she was able to be off Hydrea therapy for approximately 7 months before her hemoglobin and hematocrit lorenzo to the lever of being concerning and therapy resumed.   2014, September follow up found the Hct and HgB on the rise. She agreed to return to every other day dosing with Hydrea, 500 mg.    2015, January 19 - agreed to try phlebotomy and Hydrea was discontinued.     Prior phlebotomies:    2020 - 01/07.    2019 - 01/19, 03/20, 06/12, 09/10, 10/08, 11/05.    2018 - 02/08, 04/25, 06/26, 08/29, 11/06.    2017 - 01/06, 02/01, 03/08, 04/14, 09/21, 11/02, 12/14.    2016 - 10/28, 10/07, 09/21, 05/24, 02/05, 01/22.    2015 - 10/22, 07/22, 05/29, 05/18, 04/06, 03/09, 01/28.    03/09/20:    Hemoglobin 14.1.  Hematocrit 44.7.      500 ml  phlebotomy today.    Ferritin - pending.    Suma looks and feels good.  She has her usual anxiety today due to fear of needing another phlebotomy.  While she tolerates phlebotomies well, they make her very nervous because she hates the needles.  We continue to see her back in infusion with labs through a phlebotomy needle so she only needs one poke.      She's been agreeing to ~6 phlebotomies/year.  Her Hct is quite well controlled when doing a phlebotomy every 2 months.             She has a Rx for 0.5 mg Ativan to try and see if that would help calm her phlebotomy - associated anxieties.  She used it once but afraid she will need a  so chooses not to use.  Encouraged her to talk to her PCP about non-sedating antianxiety medications.    She agrees to be diligent with Hct checks every 2 months with a possible 500 ml phlebotomy.    Phlebotomize with goal to keep Hct < 42.      Continue daily aspirin therapy.  She can only tolerate baby aspirin as RS aspirin causes her stomach to feel poorly.  Instructed to take ASA with a meal.    07/09/20 - follow up with every 2 month HgB and Hct and potential phlebotomies.        03/11/20 Addendum:    Ferritin low @ 6 - will hold on phlebotomies.  She is not anemic.    Will obtain erythropoietin, Jak2 exon12, MPL and ADI testing.    If EPO low - BM bx    If EPO high - likely r/t her COPD.    Follow up with results.    ECOG Performance:     ECOG Performance Status: 0    Distress Assessment:             TT > 20 minutes face to face with > 50% counseling and care coordination.    Luke Guerrero, CNP     CC: Va Patten MD  ____________________________________________    Interim History:    Suma presents today feeling and looking well.  While she tolerates phlebotomies well, they make her very nervous because she hates the needles which causes her much anxiety at her appointments.  Her appetite, weight and performance status remain quite stable.  She denies nausea,  vomiting, cough, fever, chills, headache, visual or mentation disturbance, bowel or bladder issues.  She has stopped both cigarettes and E cigarettes as well.  She had 7 - 500 ml phlebotomies in 2015, 6 in 2016, 7 in 2017, 5 in 2018 and 6 in 2019.    Pain Status:    Currently in Pain: No/denies    Review of Systems:    Constitutional  Constitutional (WDL): All constitutional elements are within defined limits  Neurosensory  Neurosensory (WDL): All neurosensory elements are within defined limits  Cardiovascular  Cardiovascular (WDL): All cardiovascular elements are within defined limits  Pulmonary  Respiratory (WDL): Within Defined Limits  Gastrointestinal  Gastrointestinal (WDL): All gastrointestinal elements are within defined limits  Genitourinary  Genitourinary (WDL): All genitourinary elements are within defined limits  Integumentary  Integumentary (WDL): All integumentary elements are within defined limits  Patient Coping  Patient Coping: Open/discussion  Distress Assessment     Accompanied by  Accompanied by: Alone    Past Histories:    Past Medical History:   Diagnosis Date     Osteoporosis      Parathyroid adenoma      Polycythemia vera (H)      Vitamin D deficiency          Past Surgical History:   Procedure Laterality Date     CATARACT EXTRACTION Left      DILATION AND CURETTAGE OF UTERUS       KNEE SURGERY      torn meniscus     THYROIDECTOMY, PARTIAL N/A 12/2/2014    Procedure: RIGHT PARATHYROID EXCISION ;  Surgeon: Tressa Louis MD;  Location: [facility];  Service:        Physical Exam:    Recent Vitals 3/9/2020   Height -   Weight 122 lbs 13 oz   BSA (m2) 1.57 m2   /73   Pulse 96   Temp 98   Temp src 1   SpO2 95   Some recent data might be hidden     General:  Alert and oriented.  Very pleasant but anxious with anticipated phlebotomy.     HEENT:  Anicteric sclera. EOMI. OJ. No oral lesions.  Extremities:  No edema  Skin:    No rash noted    Lab Results:    Reviewed with  patient.    Recent Results (from the past 24 hour(s))   Hemoglobin   Result Value Ref Range    Hemoglobin 14.1 12.0 - 16.0 g/dL   Hematocrit   Result Value Ref Range    Hematocrit 44.7 35.0 - 47.0 %       Imaging:    No results found.

## 2021-06-07 NOTE — PROGRESS NOTES
"Livier Bowers is a 79 y.o. female who is being evaluated via a billable telephone visit.      The patient has been notified of following:     \"This telephone visit will be conducted via a call between you and your physician/provider. We have found that certain health care needs can be provided without the need for a physical exam.  This service lets us provide the care you need with a short phone conversation.  If a prescription is necessary we can send it directly to your pharmacy.  If lab work is needed we can place an order for that and you can then stop by our lab to have the test done at a later time.    If during the course of the call the physician/provider feels a telephone visit is not appropriate, you will not be charged for this service.\"     Livier Bowers complains of    Chief Complaint   Patient presents with     HE Cancer     Malignant Hematology       I have reviewed and updated the patient's Past Medical History, Social History, Family History and Medication List.    ALLERGIES:    Patient has no known allergies.   Christina Maharaj RN    Additional provider notes:     1. 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  Hct @ 53.     Serum EPO was low normal.     Initially appeared secondary, likely to COPD.     Sleep study was unremarkable.      Phlebotomy was recommended, but she declined.     Hydroxyurea was initiated at 500 mg daily; however, ineffective and therefore increased to 500 mg twice daily. She then cut back on her smoking and self manipulated the dose of Hydrea to take the least amount possible.     2013, August - she quit smoking completely, switching to the E cigarette.  Hydrea was further decreased.    2013, December - she was down to Hydrea 500 mg every other day and she opted to discontinue it.    2014, July - hematocrit elevated at 50.7 and hemoglobin 16.8. She would only agree to restarting the Hydrea at 500 mg every other day. Four week follow up " in mid-August, 2014, found her Hct responding to low dose Hydrea. Wishing to be on the lowest dose possible of Hydrea she wanted to try every 3rd day Hydrea, 500 mg, as she was able to be off Hydrea therapy for approximately 7 months before her hemoglobin and hematocrit lorenzo to the lever of being concerning and therapy resumed.   2014, September follow up found the Hct and HgB on the rise. She agreed to return to every other day dosing with Hydrea, 500 mg.    2015, January - agreed to try phlebotomy and Hydrea was discontinued.     Prior phlebotomies:    2020 - 03/09, 01/07.    2019 - 01/19, 03/20, 06/12, 09/10, 10/08, 11/05.    2018 - 02/08, 04/25, 06/26, 08/29, 11/06.    2017 - 01/06, 02/01, 03/08, 04/14, 09/21, 11/02, 12/14.    2016 - 10/28, 10/07, 09/21, 05/24, 02/05, 01/22.    2015 - 10/22, 07/22, 05/29, 05/18, 04/06, 03/09, 01/28.    Assessment/Plan:      Hemoglobin 14.1.  Hematocrit 44.7.      Ferritin - 6.     Erythropoietin - WNL @ 11.    If EPO low - BM bx    If EPO high - likely r/t her COPD.    Suma states that she feels good.  She denies pain, cough, fever, chills, unusual headaches, visual or mentation disturbance, bowel or bladder issues.      With onset of iron deficiency, will need to stop phlebotomies.  She is not anemic.    Jak2 exon12, MPL and ADI testing - pending.      Will resume Hydrea, 500 mg every other day.  Potential/likely toxicities and s/e reviewed.  Rx Eprescribed to chemo Pharmacist #[id number] with 1 refill.    Goal to keep Hct < 42.    Continue daily aspirin therapy.  She can only tolerate baby aspirin as RS aspirin causes her stomach to feel poorly.  Instructed to take ASA with a meal.    4-week f/u with CBC and ferritin.  Can be televisit.        Phone call duration: started at 1446 ended at 1500    FARNAZ Evans, CNP

## 2021-06-07 NOTE — PROGRESS NOTES
"Livier Bowers is a 79 y.o. female who is being evaluated via a billable telephone visit.      The patient has been notified of following:     \"This telephone visit will be conducted via a call between you and your physician/provider. We have found that certain health care needs can be provided without the need for a physical exam.  This service lets us provide the care you need with a short phone conversation.  If a prescription is necessary we can send it directly to your pharmacy.  If lab work is needed we can place an order for that and you can then stop by our lab to have the test done at a later time.    Telephone visits are billed at different rates depending on your insurance coverage. During this emergency period, for some insurers they may be billed the same as an in-person visit.  Please reach out to your insurance provider with any questions.    If during the course of the call the physician/provider feels a telephone visit is not appropriate, you will not be charged for this service.\"    Patient has given verbal consent to a Telephone visit? Yes.     Patient would like to receive their AVS by - declined    Laboratories:    Reviewed with Livier.    Recent Results (from the past 168 hour(s))   Ferritin   Result Value Ref Range    Ferritin 6 (L) 10 - 130 ng/mL   HM1 (CBC with Diff)   Result Value Ref Range    WBC 6.8 4.0 - 11.0 thou/uL    RBC 5.30 3.80 - 5.40 mill/uL    Hemoglobin 14.8 12.0 - 16.0 g/dL    Hematocrit 46.8 35.0 - 47.0 %    MCV 88 80 - 100 fL    MCH 27.9 27.0 - 34.0 pg    MCHC 31.6 (L) 32.0 - 36.0 g/dL    RDW 18.0 (H) 11.0 - 14.5 %    Platelets 268 140 - 440 thou/uL    MPV 10.1 8.5 - 12.5 fL    Neutrophils % 69 50 - 70 %    Lymphocytes % 21 20 - 40 %    Monocytes % 8 2 - 10 %    Eosinophils % 1 0 - 6 %    Basophils % 1 0 - 2 %    Neutrophils Absolute 4.7 2.0 - 7.7 thou/uL    Lymphocytes Absolute 1.5 0.8 - 4.4 thou/uL    Monocytes Absolute 0.6 0.0 - 0.9 thou/uL    Eosinophils Absolute 0.1 0.0 " - 0.4 thou/uL    Basophils Absolute 0.1 0.0 - 0.2 thou/uL           Additional provider notes:     1. Polycythemia (JAK2 and BCR-ABL negative.  Jak2 exon12, MPL and ADI testing - no mutations identified):     79 y.o.     2010 - diagnosed:    HgB @ 18.  Hct @ 53.     Serum EPO was low normal.     Initially appeared secondary, likely to COPD.     Sleep study was unremarkable.      Phlebotomy was recommended, but she declined.     Hydroxyurea was initiated at 500 mg daily; however, ineffective and therefore increased to 500 mg twice daily. She then cut back on her smoking and self manipulated the dose of Hydrea to take the least amount possible.     2013, August - she quit smoking completely, switching to the E cigarette.  Hydrea dosing further decreased.    2013, December - she was down to Hydrea 500 mg every other day and she opted to discontinue it.    2014, July - hematocrit elevated at 50.7 and hemoglobin 16.8.     She would only agree to restarting the Hydrea at 500 mg every other day.     Four week follow up in mid-August, 2014, found her Hct responding to low dose Hydrea. Wishing to be on the lowest dose possible of Hydrea she wanted to try every 3rd day Hydrea, 500 mg, as she was able to be off Hydrea therapy for approximately 7 months before her hemoglobin and hematocrit lorenzo to the lever of being concerning and therapy resumed.       2014, September follow up found the Hct and HgB on the rise. She agreed to return to every other day dosing with Hydrea, 500 mg.    2015, January - opted to try phlebotomy and Hydrea was discontinued.     Prior phlebotomies:    2020 - 03/09, 01/07.    2019 - 01/19, 03/20, 06/12, 09/10, 10/08, 11/05.    2018 - 02/08, 04/25, 06/26, 08/29, 11/06.    2017 - 01/06, 02/01, 03/08, 04/14, 09/21, 11/02, 12/14.    2016 - 10/28, 10/07, 09/21, 05/24, 02/05, 01/22.    2015 - 10/22, 07/22, 05/29, 05/18, 04/06, 03/09, 01/28.    03/09/20 - With onset of iron deficiency, stopped phlebotomies.   She is not anemic.  Resumed Hydrea, 500 mg every other day.     04/20/20:    CBC - WBC, ANC and Plts WNL.  HgB 14.8.  HcT 46.8.    Ferritin - yet low @ 6.     Erythropoietin - WNL @ 11.    If EPO low - BM bx    If EPO high - likely r/t her COPD.    03/23/20 Jak2 exon12, MPL and ADI testing - no mutations identified.      Suma states that she feels good.  She denies pain, cough, fever, chills, unusual headaches, visual or mentation disturbance, bowel or bladder issues.  No tolerance issues with Hydrea.    Assessment/Plan:    1. 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  Rx #30 with 1 refill Eprescribed to chemo Pharmacist.    Potential/likely toxicities and s/e reviewed.      Goal to keep Hct < 42.    Continue daily aspirin therapy.  She can only tolerate baby aspirin as RS aspirin causes her stomach to feel poorly.  Instructed to take ASA with a meal.    Reviewed covid-19 restrictions and precautions and encouraged to follow state and federal recommendations.    05/14/20 - f/u with CBC and ferritin.  Can be televisit with labs the day prior.         Phone call duration:  11 minutes    FARNAZ Singh, CNP

## 2021-06-08 NOTE — PROGRESS NOTES
"[facility] Hematology and Oncology Progress Note    Patient: Livier Bowers  MRN: [id number]  Date of Service: 1/6/2017         Reason for Visit:    Follow up polycythemia (appears secondary).    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     Appears secondary, likely COPD.     2010 - hemoglobin 18 and hematocrit of 53. Serum EPO was low normal. Phlebotomy was recommended, but she declined. Sleep study was unremarkable.  Hydroxyurea was initiated at 500 mg daily; however, ineffective and therefore increased to 500 mg twice daily. She then cut back on her smoking and self manipulated the dose of Hydrea to take the least amount possible.     2013, August - she quit smoking completely, switching to the E cigarette.  Hydrea was further decreased.    2013, December - she was down to Hydrea 500 mg every other day and she opted to discontinue it.    2014, July - hematocrit elevated at 50.7 and hemoglobin 16.8. She would only agree to restarting the Hydrea at 500 mg every other day. Four week follow up in mid-August, 2014, found her Hct responding to low dose Hydrea. Wishing to be on the lowest dose possible of Hydrea she wanted to try every 3rd day Hydrea, 500 mg, as she was able to be off Hydrea therapy for approximately 7 months before her hemoglobin and hematocrit lorenzo to the lever of being concerning and therapy resumed.   2014, September follow up found the Hct and HgB on the rise. She agreed to return to every other day dosing with Hydrea, 500 mg.    2015, January 19 - agreed to try phlebotomy and Hydrea was discontinued.     Prior phlebotomies:    2017 - 01/06.    2016 - 10/28, 10/07, 09/21, 05/24, 02/05, 01/22.    2015 - 10/22, 07/22, 05/29, 05/18, 04/06, 03/09, 01/28.    01/06/17 CBC - Hemoglobin 14.4, hematocrit up to 45. WBC and Plt WNL.      Patient feels well, enjoys being off Hydrea and tolerating phlebotomies \"OK\". She continues with E cigarettes but has cut back. "     Proceed with a phlebotomy today.  Begin monthly CBCs and 500 ml phlebotomy if Hct > 42, hold phlebotomy if Hct < 42.  Follow up in 4 months.  Goal to keep Hct < 42-45.    Continue with daily RS aspirin therapy.     05/10/17 - follow up with labs and potential phlebotomy.      ECOG Performance:     ECOG Performance Status: 0    Distress Assessment:    Distress Assessment Score: 4 (life issues)        > 25 minutes with > 50% counseling and care coordination.    Luke Guerrero, CNP     CC: Lauren Haines MD    ______________________________________________________________________________    History of Present Illness:    The patient presents today feeling and looking well.  She had a nice [place].  She has tolerated phlebotomies without incidence or need for antianxiety premedications. Her appetite and weight remain quite stable.  Her performance status is stable and good.  She denies nausea, vomiting, cough, fever, chills, headache, visual or mentation disturbance, bowel or bladder issues. She continues with E cigarettes.  She had 7 - 500 ml phlebotomies in 2015 and 6 in 2016.     Pain Status:    Currently in Pain: No/denies    Review of Systems:    Constitutional  Fatigue: None  Fever: None  Chills: None  Weight Gain: None  Weight Loss: None  Neurosensory  Peripheral Motor Neuropathy: None  Ataxia: None  Peripheral Sensory Neuropathy: None  Confusion: None  Syncope: None  Cardiovascular  Cardiovascular (WDL): All cardiovascular elements are within defined limits  Pulmonary  Respiratory (WDL): Within Defined Limits  Gastrointestinal  Anorexia: None  Constipation: None  Diarrhea: None  Dysphagia: None  Esophagitis: None  Nausea: None  Pharyngitis: None  Vomiting: None  Dysgeusia: None  Dry Mouth: None  Genitourinary  Genitourinary (WDL): All genitourinary elements are within defined limits  Integumentary  Integumentary (WDL): All integumentary elements are within defined limits  Patient Coping  Patient  Coping: Accepting  Distress Assessment  Distress Assessment Score: 4 (life issues)  Accompanied by  Accompanied by: Alone    Past Histories:    Past Medical History   Diagnosis Date     Osteoporosis      Parathyroid adenoma      Polycythemia vera      Vitamin D deficiency          Past Surgical History   Procedure Laterality Date     Dilation and curettage of uterus       Thyroidectomy, partial N/A 12/2/2014     Procedure: RIGHT PARATHYROID EXCISION ;  Surgeon: Tressa Louis MD;  Location: [facility];  Service:        Physical Exam:    Recent Vitals 10/28/2016   Height -   Weight -   BSA (m2) -   /64   Pulse 91   Temp 98.3   Temp src 1   SpO2 98     General:  Pleasant.  Alert and oriented. No distress.  HEENT:  Anicteric sclera. EOMI. OJ. No oral lesions.  Extremities:  No edema  Skin:    No rash  Chest:  Lungs clear.  CVS:  S1S2.  RRR.  No murmur heard  Abdomen: No organomegaly.  Lymph nodes: No palpable adenopathy neck, axilla or groin.    Lab Results:    Reviewed with patient.    Recent Results (from the past 24 hour(s))   HM2 (CBC W/O DIFF)   Result Value Ref Range    WBC 7.4 4.0 - 11.0 thou/uL    RBC 5.21 3.80 - 5.40 mill/uL    Hemoglobin 14.4 12.0 - 16.0 g/dL    Hematocrit 45.0 35.0 - 47.0 %    MCV 86 80 - 100 fL    MCH 27.5 27.0 - 34.0 pg    MCHC 31.9 (L) 32.0 - 36.0 g/dL    RDW 15.3 (H) 11.0 - 14.5 %    Platelets 254 140 - 440 thou/uL    MPV 8.7 7.0 - 10.0 fL     Imaging:    No results found.

## 2021-06-08 NOTE — PROGRESS NOTES
"Livier Bowers is a 79 y.o. female who is being evaluated via a billable telephone visit.      The patient has been notified of following:     \"This telephone visit will be conducted via a call between you and your physician/provider. We have found that certain health care needs can be provided without the need for a physical exam.  This service lets us provide the care you need with a short phone conversation.  If a prescription is necessary we can send it directly to your pharmacy.  If lab work is needed we can place an order for that and you can then stop by our lab to have the test done at a later time.    Telephone visits are billed at different rates depending on your insurance coverage. During this emergency period, for some insurers they may be billed the same as an in-person visit.  Please reach out to your insurance provider with any questions.    If during the course of the call the physician/provider feels a telephone visit is not appropriate, you will not be charged for this service.\"    Patient has given verbal consent to a Telephone visit? Yes    What phone number would you like to be contacted at? [phone]    Patient would like to receive their AVS by [employer]:    Recent Results (from the past 168 hour(s))   Ferritin   Result Value Ref Range    Ferritin 13 10 - 130 ng/mL   HM2 (CBC W/O DIFF)   Result Value Ref Range    WBC 8.2 4.0 - 11.0 thou/uL    RBC 4.97 3.80 - 5.40 mill/uL    Hemoglobin 14.8 12.0 - 16.0 g/dL    Hematocrit 45.4 35.0 - 47.0 %    MCV 91 80 - 100 fL    MCH 29.8 27.0 - 34.0 pg    MCHC 32.6 32.0 - 36.0 g/dL    RDW 21.9 (H) 11.0 - 14.5 %    Platelets 229 140 - 440 thou/uL    MPV 9.2 8.5 - 12.5 fL     Review of Systems:    Constitutional  Constitutional (WDL): Exceptions to WDL  Fatigue: Fatigue relieved by rest  Neurosensory  Neurosensory (WDL): All neurosensory elements are within defined limits  Eye   Eye Disorder (WDL): All eye disorder elements are within defined " limits  Ear  Ear Disorder (WDL): All ear disorder elements are within defined limits  Cardiovascular  Cardiovascular (WDL): All cardiovascular elements are within defined limits  Pulmonary  Respiratory (WDL): Within Defined Limits  Gastrointestinal  Gastrointestinal (WDL): All gastrointestinal elements are within defined limits  Genitourinary  Genitourinary (WDL): All genitourinary elements are within defined limits  Lymphatic     Musculoskeletal and Connective Tissue  Musculoskeletal and Connetive Tissue Disorders (WDL): Exceptions to WDL  Arthralgia: Mild pain(chronic)  Bone Pain: Mild pain  Integumentary  Integumentary (WDL): All integumentary elements are within defined limits  Patient Coping  Patient Coping: Accepting;Open/discussion  Distress Assessment  Distress Assessment Score: 3  Accompanied by     Oral Chemo Adherence         HPI:     1.  Polycythemia (JAK2 and BCR-ABL negative.  Jak2 exon12, MPL and ADI testing - no mutations identified):     79 y.o.     2010 - diagnosed:    HgB @ 18.  Hct @ 53.     Serum EPO was low normal.     Initially appeared secondary, likely to COPD.     Sleep study was unremarkable.      Phlebotomy was recommended, but she declined.     Hydroxyurea was initiated at 500 mg daily; however, ineffective and therefore increased to 500 mg twice daily. She then cut back on her smoking and self manipulated the dose of Hydrea to take the least amount possible.     2013, August - she quit smoking completely, switching to the E cigarette.  Hydrea dosing further decreased.    2013, December - she was down to Hydrea 500 mg every other day and she opted to discontinue it.    2014, July - hematocrit elevated at 50.7 and hemoglobin 16.8.     She would only agree to restarting the Hydrea at 500 mg every other day.     Four week follow up in mid-August, 2014, found her Hct responding to low dose Hydrea. Wishing to be on the lowest dose possible of Hydrea she wanted to try every 3rd day Hydrea,  500 mg, as she was able to be off Hydrea therapy for approximately 7 months before her hemoglobin and hematocrit lorenzo to the lever of being concerning and therapy resumed.       2014, September follow up found the Hct and HgB on the rise. She agreed to return to every other day dosing with Hydrea, 500 mg.    2015, January - opted to try phlebotomy and Hydrea was discontinued.     Prior phlebotomies:    2020 - 03/09, 01/07.    2019 - 01/19, 03/20, 06/12, 09/10, 10/08, 11/05.    2018 - 02/08, 04/25, 06/26, 08/29, 11/06.    2017 - 01/06, 02/01, 03/08, 04/14, 09/21, 11/02, 12/14.    2016 - 10/28, 10/07, 09/21, 05/24, 02/05, 01/22.    2015 - 10/22, 07/22, 05/29, 05/18, 04/06, 03/09, 01/28.    03/09/20 - With onset of iron deficiency, stopped phlebotomies.  Not anemic.  Resumed Hydrea, 500 mg every other day.     03/16/20 Erythropoietin - WNL @ 11.    If EPO low - BM bx    If EPO high - likely r/t her COPD.    03/23/20 Jak2 exon12, MPL and ADI testing - no mutations identified.     04/20/20 - Hydrea increased to 500 mg daily due to increased HcT @ 46.8.     05/28/20:    CBC - WBC, ANC and Plts WNL.  HgB 14.8.  HcT 45.4.    Ferritin - WNL @ 13.     Suma states that she feels good.  Adhering to covid restrictions and precautions.  She denies pain, cough, fever, chills, unusual headaches, visual or mentation disturbance, bowel or bladder issues.  No tolerance issues with Hydrea.      Assessment and Plan:     1.   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  She does not want to increase Hydrea dose further.     Continue Hydrea, 500 mg daily      Potential/likely toxicities and s/e reviewed.      Goal to get and keep Hct < 42.    Continue daily aspirin therapy.  She can only tolerate baby aspirin as RS aspirin causes her stomach to feel poorly.  Instructed to take ASA with a meal.    Reviewed covid-19 restrictions and precautions and encouraged to follow state and federal recommendations.    07/09/20 - f/u  with HEME2 in advance.  Can be televisit with labs the day prior.          Phone call duration: 13 minutes    FARNAZ Gutierrez, CNP

## 2021-06-08 NOTE — TELEPHONE ENCOUNTER
Refill request received from [employer] for Hydrea. Last refilled on 5/21/20. Quantity #30. 1 refill.       Last saw BRIAN Guerrero CNP on 5/28/20.  Next appt 7/9/20 with ANTONI Roberts CNP.    I called [facility]. As 1 refill should be remaining.  The pharmacist I spoke to said the reason the request was sent was because patient requests 90 day supply. I told her that we would need to wait on this as sometimes Hydrea needs to be adjusted based on lab results. I told her patient can discuss a 90 day supply at upcoming visit in July.  Pharmacist verbalized understanding and will refill the 30 day supply/IBAN Andrade RN

## 2021-06-09 NOTE — PROGRESS NOTES
[facility] Hematology and Oncology Progress Note    Patient: Livier Bowers  MRN: [id number]  Date of Service: 07/10/2020        Reason for Visit    1. Polycythemia    ECOG Performance   ECOG Performance Status: 0    Distress Assessment  Distress Assessment Score: 3(anxiety coming here)    Pain  Pain Score (Initial OR Reassessment): 0      Assessment/Plan  1. Polycythemia  Has continued hydroxyurea 500 mg daily. Her hct is up slightly to 46.6. Hemoglobin is up slightly to 15.5. Remainder of her blood counts are normal.    She is tolerating this Hydrea well, no side effects.   Goal Hct is 42. I reviewed the trend over the past 3-4 months with the patient and explained it does not appear she is getting the response towards goal on once daily dosing and proposed trialing Hydrea 500 two times a day (or could even to once daily alternating with two times a day the next day).     She is reluctant to make changes, particularily since her Hct is stable on this dose. She does not recall notable side effects on the drug, but wants to limit how much she is taking if things are stable. She opted to continue the 500 mg daily dosing for another six weeks and if still no response or further increase in the hgb//hct, she will be open to increasing the dose then.    Continues daily ASA therapy, can only tolerate baby ASA due to stomach upset with full-dose.    Plan:  Continue hydroxyurea at 500 mg daily  Return in 6 weeks for labs, will also recheck ferritin to ensure it continues improving post-phlebotomies.  Will arrange a Virtual Visit a subsequent day with Luke Guerrero, JACIEL    ______________________________________________________________________________    History of Present Illness/ Interval History    Ms. Livier Bowers  is a 79 y.o. here for 6 week follow-up. At her visit in May, while on Hydroxyurea 500 mg daily, her hematocrit was dropping some but not yet to goal <42. She preferred staying on this dosing rather than  increasing. She has continued without interruptions. Tolerates it without side effects. Feeling well. No respiratory, cardiac symptoms nor lower extremity edema.    Review of Systems    Constitutional  Constitutional (WDL): All constitutional elements are within defined limits  Neurosensory  Neurosensory (WDL): All neurosensory elements are within defined limits  Cardiovascular  Cardiovascular (WDL): All cardiovascular elements are within defined limits  Pulmonary  Respiratory (WDL): Within Defined Limits  Gastrointestinal  Gastrointestinal (WDL): All gastrointestinal elements are within defined limits  Genitourinary  Genitourinary (WDL): All genitourinary elements are within defined limits  Integumentary  Integumentary (WDL): All integumentary elements are within defined limits  Patient Coping  Patient Coping: Open/discussion  Distress Assessment  Distress Assessment Score: 3(anxiety coming here)  Accompanied by  Accompanied by: Alone      Oncology History/Treatment  Diagnosis/Stage:   2010: Polycythemia (JAK2 and BCR-ABL negative; JAK2 exon12, MPL and ADI testing no mutations)  -Hgb 18/ Hct 53  -Serum EPO low normal  -Initially, appeared likely secondary to COPD. Sleep study unremarkable  -3/2020: -Erythropoietin WNL (11). Jak2 exon 12, MPL, ADI testing - no mutations identified.    Treatment:  2010: patient declined phlebotomy. Started hydroxyurea  -500 mg daily - ineffective  -500 mg two times a day. Overtime, patient self-manipulated dose and cut back on her smoking. Later self-discontinued the med (12/2013)    2014: While off hydroxyurea, Hgb elevated 16.8 / Hct 50.7  -Resumed at 500 mg every other day (per pt request) which helped keep hct slightly lower    2015 - 3/2020: patient opted to try phlebotomy. Hydroxyurea discontinued during phlebotomies  -2015: every 1-2 months avg  -2016: every 1-3 months avg  -2017: every 1-3 months avg  -2018: every 2 months avg  -2019: every 2 months avg  -2020: 1/7, 3/9 (to  date). Further phlebotomies held for new iron deficiency (without anemia)    3/9/2020: Resumed hydroxyurea - GOAL Hct <42  -500 mg every other day   -4/20/20: 500 mg every day (increased Hct (46.8)  -5/28/20: Continued 500 mg every day per patient request (Hct 45.4; Hgb 14.8; Ferritin WNL 13)  -7/10/20: Continued 500 mg every day per patient request (Hct 46.6; Hgb 15.5)      Past History  Past Medical History:   Diagnosis Date     Osteoporosis      Parathyroid adenoma      Polycythemia vera (H)      Vitamin D deficiency          Past Surgical History:   Procedure Laterality Date     CATARACT EXTRACTION Left      DILATION AND CURETTAGE OF UTERUS       KNEE SURGERY      torn meniscus     THYROIDECTOMY, PARTIAL N/A 12/2/2014    Procedure: RIGHT PARATHYROID EXCISION ;  Surgeon: Tressa Louis MD;  Location: [facility];  Service:        Physical Exam    Recent Vitals 7/10/2020   Height -   Weight 126 lbs   BSA (m2) 1.59 m2   /65   Pulse 88   Temp 98.4   Temp src 1   SpO2 97   Some recent data might be hidden       GENERAL: Alert and oriented to time place and person. Seated comfortably. In no distress. Masked.  HEAD: Atraumatic and normocephalic. No alopecia.  EYES: OJ, EOMI. No erythema. No icterus.  CHEST: clear to auscultation bilaterally. Resonant to percussion throughout bilaterally. Symmetrical breath movements bilaterally.  CVS: S1 and S2 are heard. Regular rate and rhythm. No murmur or gallop or rub heard.  EXTREMITIES: Warm. No peripheral edema.  SKIN: no rash, or bruising or purpura.   NEURO: No gross deficit noted. Non-antalgic gait.    Lab Results    Recent Results (from the past 168 hour(s))   HM2 (CBC W/O DIFF)   Result Value Ref Range    WBC 5.8 4.0 - 11.0 thou/uL    RBC 4.67 3.80 - 5.40 mill/uL    Hemoglobin 15.5 12.0 - 16.0 g/dL    Hematocrit 46.6 35.0 - 47.0 %     80 - 100 fL    MCH 33.2 27.0 - 34.0 pg    MCHC 33.3 32.0 - 36.0 g/dL    RDW 21.1 (H) 11.0 - 14.5 %     Platelets 220 140 - 440 thou/uL    MPV 9.8 8.5 - 12.5 fL       Imaging    No results found.    Billing  Total face to face time 10 minutes, with 5 minutes of that dedicated to counseling, education or coordination of care.     Signed by: Moira Roberts

## 2021-06-09 NOTE — PROGRESS NOTES
Surgery-  Cataract ( left eye) and ( right eye)  DOS - 4/11/17 and 4/20/17   Location- [facility] fax [phone]  Physician - Dr. Sulaiman An      ASSESSMENT/PLAN:  Livier Bowers 76 y.o. female with cataracts here for preoperative evaluation for cataract extraction.     1. Preop examination  2. Cataract  3.  Tobacco use    Cardiac Risk Stratification and Management:  Active Cardiac Conditions: No  Low Risk surgery: Yes  Functional Capacity >=4METS:  Yes    Surgical Risk: Risk for cardiac event is 0.4%.   Counseled cessation of cigarette use  I do not have any contraindication to the planned procedure.    CHIEF COMPLAINT:  Chief Complaint   Patient presents with     Pre-op Exam     Cataract         SUBJECTIVE:  Livier Bowers is a 76 y.o. female who is here for preoperative evaluation. She having left cataract extraction on April 11, 2017 at [facility] with Dr. An. She is going to have a right cataract extraction on April 20, 2017. Vitals are stable today.     Prior Anesthesia: Yes  Previous Anesthesia Reaction:  no  Bleeding Disorder: no    REVIEW OF SYSTEMS:  No chest pain, shortness of breath, fevers, chills, cough, diarrhea, nausea, vomiting. Denies abdominal pain or problems with urination. All other systems reviewed are negative.     PFSH:  Currently uses e-cigarettes.   History   Smoking Status     Former Smoker     Quit date: 2/1/2014   Smokeless Tobacco     Current User     Comment: E-cigs  10/day       Family History   Problem Relation Age of Onset     Cirrhosis Mother      Lung disease Father      Mesothelioma     Heart disease Maternal Grandfather      Colon cancer Paternal Grandmother      Heart attack Paternal Grandfather        Past Surgical History:   Procedure Laterality Date     DILATION AND CURETTAGE OF UTERUS       THYROIDECTOMY, PARTIAL N/A 12/2/2014    Procedure: RIGHT PARATHYROID EXCISION ;  Surgeon: Tressa Louis MD;  Location: [facility];  " Service:        No Known Allergies    Active Ambulatory Problems     Diagnosis Date Noted     Polycythemia vera      Joint Pain, Localized In The Hip      Pain In The Arms      Vitamin D Deficiency      Hypercalcemia      Adenoma Of The Parathyroid Gland      Acute Vernal Conjunctivitis      Hyperparathyroidism      Nicotine Dependence      Osteoporosis      Postmenopausal Osteoporosis      Eye Pain      Resolved Ambulatory Problems     Diagnosis Date Noted     No Resolved Ambulatory Problems     Past Medical History:   Diagnosis Date     Osteoporosis      Parathyroid adenoma      Polycythemia vera      Vitamin D deficiency        PHYSICAL EXAM:    Vitals:    04/03/17 1506   BP: 120/68   Patient Site: Left Arm   Patient Position: Sitting   Cuff Size: Adult Regular   Pulse: 91   SpO2: 97%   Weight: 127 lb 8 oz (57.8 kg)   Height: 5' 3\" (1.6 m)       Physical Exam:  Constitutional: Patient is oriented to person, place, and time. Patient appears well-developed and well-nourished. No distress.   Head: Normocephalic and atraumatic.   Right Ear: External ear normal. Ear canal and TM normal.   Left Ear: External ear normal. Ear canal and TM normal.   Nose: Nose normal.   Mouth/Throat: Oropharynx is clear and moist. No oropharyngeal exudate.   Eyes: Conjunctivae and EOM are normal. Pupils are equal, round, and reactive to light. Right eye exhibits no discharge. Left eye exhibits no discharge. No scleral icterus.   Neck: Neck supple. No JVD present. No tracheal deviation present. No thyromegaly present.   Cardiovascular: Normal rate, regular rhythm, normal heart sounds and intact distal pulses. No murmur heard.   Pulmonary/Chest: Effort normal and breath sounds normal. No stridor. No respiratory distress. Patient has no wheezes, no rales, exhibits no tenderness.   Abdominal: Soft. Bowel sounds are normal. Patient exhibits no distension and no mass. There is no tenderness. There is no rebound and no guarding. "   Lymphadenopathy:  Patient has no cervical adenopathy.   Neurological: Patient is alert and oriented to person, place, and time. Patient has normal reflexes. No cranial nerve deficit. Coordination normal.   Skin: Skin is warm and dry. No rash noted. Patient is not diaphoretic. No erythema. No pallor.       Results for orders placed or performed in visit on 03/08/17   HM2 (CBC W/O DIFF)   Result Value Ref Range    WBC 7.6 4.0 - 11.0 thou/uL    RBC 4.95 3.80 - 5.40 mill/uL    Hemoglobin 13.0 12.0 - 16.0 g/dL    Hematocrit 41.0 35.0 - 47.0 %    MCV 83 80 - 100 fL    MCH 26.2 (L) 27.0 - 34.0 pg    MCHC 31.6 (L) 32.0 - 36.0 g/dL    RDW 16.4 (H) 11.0 - 14.5 %    Platelets 282 140 - 440 thou/uL    MPV 8.6 7.0 - 10.0 fL       QUALITY MEASURES:  I have counseled the patient for tobacco cessation and the follow up will occur  at the next visit.    ADDITIONAL HISTORY SUMMARIZED (2): None.  DECISION TO OBTAIN EXTRA INFORMATION (1): Used surgical risk calculator.   RADIOLOGY TESTS (1): None.  LABS (1): None.  MEDICINE TESTS (1): None.  INDEPENDENT REVIEW (2 each): None.     The visit lasted a total of 16 minutes face to face with the patient. Over 50% of the time was spent counseling and educating the patient about pre operative procedures.    ISandrita, am scribing for and in the presence of, Dr. Abbott.    IDr. Abbott, personally performed the services described in this documentation, as scribed by Sandrita Williamson in my presence, and it is both accurate and complete.    MEDICATIONS:  Current Outpatient Prescriptions   Medication Sig Dispense Refill     aspirin 81 MG EC tablet Take 1 tablet (81 mg total) by mouth daily. 90 tablet 0     CALCIUM CARBONATE/VITAMIN D3 (CALCIUM 500 WITH D ORAL) Take 2 tablets by mouth daily. Gummies w/700 IU Vitamin D in each       cholecalciferol, vitamin D3, 2,000 unit Tab Take 1 tablet by mouth daily.       diclofenac (VOLTAREN) 0.1 % ophthalmic solution        ofloxacin  (OCUFLOX) 0.3 % ophthalmic solution        prednisoLONE acetate (PRED-FORTE) 1 % ophthalmic suspension        No current facility-administered medications for this visit.        Total data points: 1

## 2021-06-10 NOTE — PROGRESS NOTES
[facility] Hematology and Oncology Progress Note    Patient: Livier Bowers  MRN: [id number]  Date of Service: 5/10/2017         Reason for Visit:    Follow up polycythemia (appears secondary).    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     Appears secondary, likely COPD.     2010 - hemoglobin 18 and hematocrit of 53. Serum EPO was low normal. Phlebotomy was recommended, but she declined. Sleep study was unremarkable.  Hydroxyurea was initiated at 500 mg daily; however, ineffective and therefore increased to 500 mg twice daily. She then cut back on her smoking and self manipulated the dose of Hydrea to take the least amount possible.     2013, August - she quit smoking completely, switching to the E cigarette.  Hydrea was further decreased.    2013, December - she was down to Hydrea 500 mg every other day and she opted to discontinue it.    2014, July - hematocrit elevated at 50.7 and hemoglobin 16.8. She would only agree to restarting the Hydrea at 500 mg every other day. Four week follow up in mid-August, 2014, found her Hct responding to low dose Hydrea. Wishing to be on the lowest dose possible of Hydrea she wanted to try every 3rd day Hydrea, 500 mg, as she was able to be off Hydrea therapy for approximately 7 months before her hemoglobin and hematocrit lorenzo to the lever of being concerning and therapy resumed.   2014, September follow up found the Hct and HgB on the rise. She agreed to return to every other day dosing with Hydrea, 500 mg.    2015, January 19 - agreed to try phlebotomy and Hydrea was discontinued.     Prior phlebotomies:    2017 - 01/06, 02/01, 03/08, 04/14.    2016 - 10/28, 10/07, 09/21, 05/24, 02/05, 01/22.    2015 - 10/22, 07/22, 05/29, 05/18, 04/06, 03/09, 01/28.    05/10/17:    CBC - Hemoglobin 12.8, hematocrit 40.8. WBC and Plt WNL.      CMP - basically WNL    The patient feels good.  Tolerating phlebotomies.  Continues with E cigarettes but has  cut back.     No need for phlebotomy this month.    She's been requiring about 6 phlebotomies/year.  She would like to try being a blood donor.  She will plan on 2 donations in the next four months.  Our goal is to keep Hct at about 42 or less.      Continue daily RS aspirin therapy.     09/06/17 - follow up with labs and potential phlebotomy.      ECOG Performance:     ECOG Performance Status: 0    Distress Assessment:    Distress Assessment Score: No distress        TT > 20 minutes face to face with > 50% counseling and care coordination.    Luke Guerrero, CNP     CC: Lauren Haines MD    ______________________________________________________________________________    Interim History:    The patient presents today feeling and looking well.  She has had monthly phlebotomies for the past several months even though her Hct was < 42.  She is tolerating phlebotomies without incidence or need for antianxiety medications. Her appetite, weight and performance status remain quite stable.  She denies nausea, vomiting, cough, fever, chills, headache, visual or mentation disturbance, bowel or bladder issues.  She continues with E cigarettes.  She had 7 - 500 ml phlebotomies in 2015 and 6 in 2016.     Pain Status:    Currently in Pain: No/denies    Review of Systems:    Constitutional  Constitutional (WDL): All constitutional elements are within defined limits  Neurosensory  Neurosensory (WDL): All neurosensory elements are within defined limits  Cardiovascular  Cardiovascular (WDL): All cardiovascular elements are within defined limits  Pulmonary  Respiratory (WDL): Within Defined Limits  Gastrointestinal  Gastrointestinal (WDL): All gastrointestinal elements are within defined limits  Genitourinary  Genitourinary (WDL): All genitourinary elements are within defined limits  Integumentary  Integumentary (WDL): All integumentary elements are within defined limits  Patient Coping  Patient Coping: Accepting  Distress  Assessment  Distress Assessment Score: No distress  Accompanied by  Accompanied by: Alone    Past Histories:    Past Medical History:   Diagnosis Date     Osteoporosis      Parathyroid adenoma      Polycythemia vera      Vitamin D deficiency          Past Surgical History:   Procedure Laterality Date     DILATION AND CURETTAGE OF UTERUS       THYROIDECTOMY, PARTIAL N/A 12/2/2014    Procedure: RIGHT PARATHYROID EXCISION ;  Surgeon: Tressa Louis MD;  Location: [facility];  Service:        Physical Exam:    Recent Vitals 5/10/2017   Weight 128 lbs 11 oz   /71   Pulse 85   Temp 98.8   Temp src 1   SpO2 99     General:  Pleasant.  Alert and oriented. No distress.  HEENT:  Anicteric sclera. EOMI. OJ. No oral lesions.  Extremities:  No edema  Skin:    No rash  Chest:  Lungs clear.  CVS:  S1S2.  RRR.  No murmur heard  Abdomen: No organomegaly.  Lymph nodes: No palpable adenopathy neck, axilla or groin.    Lab Results:    Reviewed with patient.    Recent Results (from the past 24 hour(s))   HM2 (CBC W/O DIFF)   Result Value Ref Range    WBC 7.0 4.0 - 11.0 thou/uL    RBC 4.98 3.80 - 5.40 mill/uL    Hemoglobin 12.8 12.0 - 16.0 g/dL    Hematocrit 40.8 35.0 - 47.0 %    MCV 82 80 - 100 fL    MCH 25.7 (L) 27.0 - 34.0 pg    MCHC 31.4 (L) 32.0 - 36.0 g/dL    RDW 16.0 (H) 11.0 - 14.5 %    Platelets 283 140 - 440 thou/uL    MPV 9.9 8.5 - 12.5 fL   Comprehensive Metabolic Panel   Result Value Ref Range    Sodium 143 136 - 145 mmol/L    Potassium 3.7 3.5 - 5.0 mmol/L    Chloride 108 (H) 98 - 107 mmol/L    CO2 27 22 - 31 mmol/L    Anion Gap, Calculation 8 5 - 18 mmol/L    Glucose 103 70 - 125 mg/dL    BUN 11 8 - 28 mg/dL    Creatinine 0.75 0.60 - 1.10 mg/dL    GFR MDRD Af Amer >60 >60 mL/min/1.73m2    GFR MDRD Non Af Amer >60 >60 mL/min/1.73m2    Bilirubin, Total 0.4 0.0 - 1.0 mg/dL    Calcium 9.2 8.5 - 10.5 mg/dL    Protein, Total 7.0 6.0 - 8.0 g/dL    Albumin 3.8 3.5 - 5.0 g/dL    Alkaline Phosphatase  69 45 - 120 U/L    AST 15 0 - 40 U/L    ALT 12 0 - 45 U/L       Imaging:    No results found.

## 2021-06-10 NOTE — PROGRESS NOTES
"Livier Bowers is a 79 y.o. female who is being evaluated via a billable telephone visit.      The patient has been notified of following:     \"This telephone visit will be conducted via a call between you and your physician/provider. We have found that certain health care needs can be provided without the need for a physical exam.  This service lets us provide the care you need with a short phone conversation.  If a prescription is necessary we can send it directly to your pharmacy.  If lab work is needed we can place an order for that and you can then stop by our lab to have the test done at a later time.    Telephone visits are billed at different rates depending on your insurance coverage. During this emergency period, for some insurers they may be billed the same as an in-person visit.  Please reach out to your insurance provider with any questions.    If during the course of the call the physician/provider feels a telephone visit is not appropriate, you will not be charged for this service.\"    Patient has given verbal consent to a Telephone visit? Yes    What phone number would you like to be contacted at? [phone]    Patient would like to receive their AVS by AVS Preference: Parul.    Additional provider notes: Moira Roberts NP    Phone call duration: 5 min  Start:1445  Stop: 1450      Janice Walter RN    "

## 2021-06-10 NOTE — PROGRESS NOTES
"Pt admitted per pedis for her regularly scheduled phlebotomy. Labs drawn and returned with hmg above 12 although hct less than 42 so borderline although does meet criteria for phlebotomy. Pt really wants to do partial phlebotomy as she \"already has the needle in\" Apx 350-400ml removed without difficulty. Tolerating po fluids well and is anxious to go to the eye dr as she had cataracts done on Mon which has left her with very cloudy vision of left eye.   "

## 2021-06-10 NOTE — PROGRESS NOTES
[facility] Hematology and Oncology Progress Note    Patient: Livier Bowers  MRN: [id number]  Date of Service: 08/27/2020        Reason for Visit    1. Polycythemia    Telephone visit    ECOG Performance   ECOG Performance Status: 0    Distress Assessment  0  Pain  0    Assessment/Plan  1. Polycythemia  Has continued hydroxyurea 500 mg daily.   Her hct has trended down slightly to 45.0. Hemoglobin is stable at 15.2. Remainder of her blood counts are normal.    Ferritin continues improving post-phlebotomies (44).    She is tolerating Hydrea well, no side effects.  Goal Hct is 42.   I reviewed the trend over the past 4 months with the patient and explained she's had mild response/stability. Discussed option of minimally increasing the Hydrea dose to 500 mg 4-5 days/week and 1000 mg for 2-3 days/week to reassess response. She prefers continuing current dose at this time.    Return in 8 weeks for reassessment.    Continues daily ASA therapy, can only tolerate baby ASA due to stomach upset with full-dose.    Plan:  Continue hydroxyurea at 500 mg daily.  Return in 8 weeks with labs and clinic visit.    ______________________________________________________________________________    History of Present Illness/ Interval History    Ms. Livier Bowers  is a 79 y.o. here for 6 week follow-up. At her visit in May, while on Hydroxyurea 500 mg daily, her hematocrit was dropping some but not yet to goal <42. She preferred staying on this dosing rather than increasing. She has continued without interruptions. Tolerates it without side effects. Feeling well. No respiratory, cardiac symptoms nor lower extremity edema. Chronic low back and LLE sciatica discomfort.    Review of Systems    Constitutional  Constitutional (WDL): All constitutional elements are within defined limits  Neurosensory  Neurosensory (WDL): Exceptions to WDL  Ataxia: Asymptomatic, clinical or diagnostic observations only, intervention not indicated  Peripheral  Sensory Neuropathy: Asymptomatic, loss of deep tendon reflexes or paresthesia(Lt foot, Lt leg)  Cardiovascular  Cardiovascular (WDL): All cardiovascular elements are within defined limits  Pulmonary  Respiratory (WDL): Within Defined Limits  Gastrointestinal  Gastrointestinal (WDL): All gastrointestinal elements are within defined limits  Genitourinary  Genitourinary (WDL): All genitourinary elements are within defined limits  Integumentary  Integumentary (WDL): All integumentary elements are within defined limits  Patient Coping  Patient Coping: Open/discussion      Oncology History/Treatment  Diagnosis/Stage:   2010: Polycythemia (JAK2 and BCR-ABL negative; JAK2 exon12, MPL and ADI testing no mutations)  -Hgb 18/ Hct 53  -Serum EPO low normal  -Initially, appeared likely secondary to COPD. Sleep study unremarkable  -3/2020: -Erythropoietin WNL (11). Jak2 exon 12, MPL, ADI testing - no mutations identified.    Treatment:  2010: patient declined phlebotomy. Started hydroxyurea  -500 mg daily - ineffective  -500 mg two times a day. Overtime, patient self-manipulated dose and cut back on her smoking. Later self-discontinued the med (12/2013)    2014: While off hydroxyurea, Hgb elevated 16.8 / Hct 50.7  -Resumed at 500 mg every other day (per pt request) which helped keep hct slightly lower    2015 - 3/2020: patient opted to try phlebotomy. Hydroxyurea discontinued during phlebotomies  -2015: every 1-2 months avg  -2016: every 1-3 months avg  -2017: every 1-3 months avg  -2018: every 2 months avg  -2019: every 2 months avg  -2020: 1/7, 3/9 (to date). Further phlebotomies held for new iron deficiency (without anemia)    3/9/2020: Resumed hydroxyurea - GOAL Hct <42  -500 mg every other day   -4/20/20: 500 mg every day (increased Hct (46.8)  -5/28/20: Continued 500 mg every day per patient request (Hct 45.4; Hgb 14.8; Ferritin WNL 13)  -7/10/20: Continued 500 mg every day per patient request (Hct 46.6; Hgb  15.5)  -8/27/20: Continued 500 mg every day per patient request (Hct 45.0; Hgb 15.2) (Ferritin improved 44)      Past History  Past Medical History:   Diagnosis Date     Osteoporosis      Parathyroid adenoma      Polycythemia vera (H)      Vitamin D deficiency          Past Surgical History:   Procedure Laterality Date     CATARACT EXTRACTION Left      DILATION AND CURETTAGE OF UTERUS       KNEE SURGERY      torn meniscus     THYROIDECTOMY, PARTIAL N/A 12/2/2014    Procedure: RIGHT PARATHYROID EXCISION ;  Surgeon: Tressa Louis MD;  Location: [facility];  Service:        Physical Exam    Recent Vitals 7/10/2020   Height -   Weight 126 lbs   BSA (m2) 1.59 m2   /65   Pulse 88   Temp 98.4   Temp src 1   SpO2 97   Some recent data might be hidden       GENERAL: Alert and oriented to time place and person per phone visit.    Lab Results    No results found for this or any previous visit (from the past 168 hour(s)).    Imaging    No results found.    Billing  Total telephone time: 5 min    Signed by: Moira Roberts

## 2021-06-10 NOTE — PROGRESS NOTES
Subjective:      Livier Bowers is a 76 y.o. female who is here for preop clearance.  She had cataract surgery done on her left eye before.  She was told that her right eye needed surgery but she postponed it.  It took a while for her to recover from her left eye surgery.  Vision was blurred, cloudy.  It is better at this time.  She does note his growth in her left lower eyelid which she will follow with ophthalmologist.  Denies any sudden loss of vision at this time.  No sensitivity to bright lights.    No Known Allergies  Past Medical History:   Diagnosis Date     Osteoporosis      Parathyroid adenoma      Polycythemia vera      Vitamin D deficiency      Past Surgical History:   Procedure Laterality Date     DILATION AND CURETTAGE OF UTERUS       THYROIDECTOMY, PARTIAL N/A 12/2/2014    Procedure: RIGHT PARATHYROID EXCISION ;  Surgeon: Tressa Louis MD;  Location: [facility];  Service:     left knee arthroscopic surgery for torn meniscus.  Cataract surgery    Social History     Social History     Marital status:      Spouse name: N/A     Number of children: N/A     Years of education: N/A     Occupational History     Not on file.     Social History Main Topics     Smoking status: Former Smoker     Quit date: 2/1/2014     Smokeless tobacco: Current User      Comment: E-cigs  10/day     Alcohol use 0.0 oz/week     5 Glasses of wine per week     Drug use: No     Sexual activity: No     Other Topics Concern     Not on file     Social History Narrative     Family History   Problem Relation Age of Onset     Cirrhosis Mother      Lung disease Father      Mesothelioma     Heart disease Maternal Grandfather      Colon cancer Paternal Grandmother      Heart attack Paternal Grandfather      Current Outpatient Prescriptions   Medication Sig Dispense Refill     aspirin 81 MG EC tablet Take 1 tablet (81 mg total) by mouth daily. 90 tablet 0     CALCIUM CARBONATE/VITAMIN D3 (CALCIUM 500 WITH D  ORAL) Take 2 tablets by mouth daily. Gummies w/700 IU Vitamin D in each       cholecalciferol, vitamin D3, 2,000 unit Tab Take 1 tablet by mouth daily.       diclofenac (VOLTAREN) 0.1 % ophthalmic solution        ofloxacin (OCUFLOX) 0.3 % ophthalmic solution        prednisoLONE acetate (PRED-FORTE) 1 % ophthalmic suspension        No current facility-administered medications for this visit.      Patient Active Problem List    Diagnosis Date Noted     Acute Vernal Conjunctivitis      Hyperparathyroidism      Nicotine Dependence      Osteoporosis      Postmenopausal Osteoporosis      Eye Pain      Adenoma Of The Parathyroid Gland      Polycythemia vera      Joint Pain, Localized In The Hip      Pain In The Arms      Vitamin D Deficiency      Hypercalcemia          Review of Systems   Constitutional: Decreased energy but unsure if age-related.  Keep herself active twice a week, yoga, machines.  HENT: Negative.   Eyes: Negative.   Respiratory: Negative.   Cardiovascular: Negative.   Gastrointestinal: Negative.   Endocrine: Negative.   Genitourinary: Negative.   Musculoskeletal: Negative.   Skin: Negative.   Allergic/Immunologic: Negative.   Neurological: Negative.   Hematological: Negative.   Psychiatric/Behavioral: Negative.       Objective:    Physical Exam   /70  Pulse 92  Wt 128 lb 12.8 oz (58.4 kg)  BMI 22.82 kg/m2    Vital signs noted above. AAO ×3.  HEENT negative.  Neck: Supple neck, nonpalpable cervical lymph nodes.  Lungs: Clear to auscultation bilateral.  Heart: S1-S2 regular rate and rhythm, no murmurs were noted.  Abdomen: Flabby, soft with bowel sounds and nontender.  Extremities: No edema, pulses were full and equal      Assessment/Plan:    1. Preop examination    2. Cataract    Had CBC last month, hemoglobin at 12.9.  Cleared for surgery.  Discussed precautionary measures and medications avoid prior to surgery.  She was agreeable with the plans.    Lauren Haines MD  5/12/2017

## 2021-06-12 NOTE — PATIENT INSTRUCTIONS - HE
Recent Results (from the past 240 hour(s))   HM1 (CBC with Diff)   Result Value Ref Range    WBC 6.4 4.0 - 11.0 thou/uL    RBC 3.91 3.80 - 5.40 mill/uL    Hemoglobin 14.6 12.0 - 16.0 g/dL    Hematocrit 43.6 35.0 - 47.0 %     (H) 80 - 100 fL    MCH 37.3 (H) 27.0 - 34.0 pg    MCHC 33.5 32.0 - 36.0 g/dL    RDW 13.8 11.0 - 14.5 %    Platelets 210 140 - 440 thou/uL    MPV 9.4 8.5 - 12.5 fL    Neutrophils % 64 50 - 70 %    Lymphocytes % 26 20 - 40 %    Monocytes % 8 2 - 10 %    Eosinophils % 1 0 - 6 %    Basophils % 1 0 - 2 %    Immature Granulocyte % 1 (H) <=0 %    Neutrophils Absolute 4.1 2.0 - 7.7 thou/uL    Lymphocytes Absolute 1.6 0.8 - 4.4 thou/uL    Monocytes Absolute 0.5 0.0 - 0.9 thou/uL    Eosinophils Absolute 0.1 0.0 - 0.4 thou/uL    Basophils Absolute 0.1 0.0 - 0.2 thou/uL    Immature Granulocyte Absolute 0.0 <=0.0 thou/uL

## 2021-06-12 NOTE — PROGRESS NOTES
[facility] Hematology and Oncology Progress Note    Patient: Livier Bowers  MRN: [id number]  Date of Service: 10/22/2020         Reason for Visit:    Follow up polycythemia.    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     Initially appeared secondary, likely to COPD.     2010 - hemoglobin 18 and hematocrit of 53. Serum EPO was low normal. Phlebotomy was recommended, but she declined. Sleep study was unremarkable.  Hydroxyurea was initiated at 500 mg daily; however, ineffective and therefore increased to 500 mg twice daily. She then cut back on her smoking and self manipulated the dose of Hydrea to take the least amount possible.     2013, August - she quit smoking completely, switching to the E cigarette.  Hydrea was further decreased.    2013, December - she was down to Hydrea 500 mg every other day and she opted to discontinue it.    2014, July - hematocrit elevated at 50.7 and hemoglobin 16.8. She would only agree to restarting the Hydrea at 500 mg every other day. Four week follow up in mid-August, 2014, found her Hct responding to low dose Hydrea. Wishing to be on the lowest dose possible of Hydrea she wanted to try every 3rd day Hydrea, 500 mg, as she was able to be off Hydrea therapy for approximately 7 months before her hemoglobin and hematocrit lorenzo to the lever of being concerning and therapy resumed.   2014, September follow up found the Hct and HgB on the rise. She agreed to return to every other day dosing with Hydrea, 500 mg.    2015, January 19 - agreed to try phlebotomy and Hydrea was discontinued.     Prior phlebotomies:    2020 - 03/09, 01/07.    2019 - 01/19, 03/20, 06/12, 09/10, 10/08, 11/05.    2018 - 02/08, 04/25, 06/26, 08/29, 11/06.    2017 - 01/06, 02/01, 03/08, 04/14, 09/21, 11/02, 12/14.    2016 - 10/28, 10/07, 09/21, 05/24, 02/05, 01/22.    2015 - 10/22, 07/22, 05/29, 05/18, 04/06, 03/09, 01/28.    03/09/20 - With onset of iron deficiency,  stopped phlebotomies.  Not anemic.  Resumed Hydrea, 500 mg every other day.     03/16/20 Erythropoietin - WNL @ 11.    If EPO low - BM bx.  If EPO high - likely r/t her COPD.    03/23/20 Jak2 exon12, MPL and ADI testing - no mutations identified.     04/20/20 - Hydrea increased to 500 mg daily due to increased HcT @ 46.8.     10/22/2020:    CBC- WBC and PLTs WNL.  Hemoglobin 14.6.  Hematocrit 43.6.      Suma looks and feels good.  Tolerating current dosing of Hydrea well and without known side effects.  Adhering to covid restrictions and precautions.  She denies pain, cough, fever, chills, unusual headaches, visual or mentation disturbance, bowel or bladder issues.      With hematocrit dropping, but yet > 42, she does not want to increase Hydrea dose further.    Continue Hydrea, 500 mg daily.    Potential/likely toxicities and s/e reviewed.      Goal to get and keep Hct < 42.    Continue daily aspirin therapy.  She can only tolerate baby aspirin as RS aspirin causes her stomach to feel poorly.  Instructed to take ASA with a meal.    Reviewed covid-19 restrictions and precautions and encouraged to follow state and federal recommendations.    Refuses seasonal flu vaccine despite covid19 - states she has never had the flu.    2-month follow up with HEME1 and CMP.      ECOG Performance:     ECOG Performance Status: 0    Distress Assessment:    Distress Assessment Score: 2        TT > 20 minutes face to face with > 50% counseling and care coordination.    Luke Guerrero, CNP     CC: Va Patten MD  ____________________________________________    Interim History:    Suma presents alone, feeling and looking well.  She states that she's tolerating current dosing of Hydrea well and without known side effects.  Adhering to covid restrictions and precautions.  She denies pain, cough, fever, chills, unusual headaches, visual or mentation disturbance, bowel or bladder issues.  She stopped both cigarettes and E cigarettes  as well quite some time ago.      Pain Status:    Currently in Pain: No/denies    Review of Systems:    Constitutional  Constitutional (WDL): All constitutional elements are within defined limits  Neurosensory  Neurosensory (WDL): All neurosensory elements are within defined limits  Cardiovascular  Cardiovascular (WDL): All cardiovascular elements are within defined limits  Pulmonary  Respiratory (WDL): Within Defined Limits  Gastrointestinal  Gastrointestinal (WDL): All gastrointestinal elements are within defined limits  Genitourinary  Genitourinary (WDL): All genitourinary elements are within defined limits  Integumentary  Integumentary (WDL): All integumentary elements are within defined limits  Patient Coping  Patient Coping: Accepting  Distress Assessment  Distress Assessment Score: 2  Accompanied by  Accompanied by: Alone    Past Histories:    Past Medical History:   Diagnosis Date     Osteoporosis      Parathyroid adenoma      Polycythemia vera (H)      Vitamin D deficiency          Past Surgical History:   Procedure Laterality Date     CATARACT EXTRACTION Left      DILATION AND CURETTAGE OF UTERUS       KNEE SURGERY      torn meniscus     THYROIDECTOMY, PARTIAL N/A 12/2/2014    Procedure: RIGHT PARATHYROID EXCISION ;  Surgeon: Tressa Louis MD;  Location: [facility];  Service:        Physical Exam:    Recent Vitals 10/22/2020   Height -   Weight 126 lbs 10 oz   BSA (m2) 1.6 m2   /77   Pulse -   Temp 98.4   Temp src -   SpO2 96   Some recent data might be hidden     General:  Alert and oriented.  Very pleasant but anxious.     HEENT:  Anicteric sclera. EOMI. OJ. No oral lesions.  Extremities:  No edema  Skin:    No rash noted    Lab Results:    Reviewed with patient.    Recent Results (from the past 240 hour(s))   HM1 (CBC with Diff)   Result Value Ref Range    WBC 6.4 4.0 - 11.0 thou/uL    RBC 3.91 3.80 - 5.40 mill/uL    Hemoglobin 14.6 12.0 - 16.0 g/dL    Hematocrit 43.6  35.0 - 47.0 %     (H) 80 - 100 fL    MCH 37.3 (H) 27.0 - 34.0 pg    MCHC 33.5 32.0 - 36.0 g/dL    RDW 13.8 11.0 - 14.5 %    Platelets 210 140 - 440 thou/uL    MPV 9.4 8.5 - 12.5 fL    Neutrophils % 64 50 - 70 %    Lymphocytes % 26 20 - 40 %    Monocytes % 8 2 - 10 %    Eosinophils % 1 0 - 6 %    Basophils % 1 0 - 2 %    Immature Granulocyte % 1 (H) <=0 %    Neutrophils Absolute 4.1 2.0 - 7.7 thou/uL    Lymphocytes Absolute 1.6 0.8 - 4.4 thou/uL    Monocytes Absolute 0.5 0.0 - 0.9 thou/uL    Eosinophils Absolute 0.1 0.0 - 0.4 thou/uL    Basophils Absolute 0.1 0.0 - 0.2 thou/uL    Immature Granulocyte Absolute 0.0 <=0.0 thou/uL       Imaging:    No results found.

## 2021-06-12 NOTE — PROGRESS NOTES
[facility] Hematology and Oncology Progress Note    Patient: Livier Bowers  MRN: [id number]  Date of Service: 9/8/2017         Reason for Visit:    Follow up polycythemia (appears secondary).    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     Appears secondary, likely COPD.     2010 - hemoglobin 18 and hematocrit of 53. Serum EPO was low normal. Phlebotomy was recommended, but she declined. Sleep study was unremarkable.  Hydroxyurea was initiated at 500 mg daily; however, ineffective and therefore increased to 500 mg twice daily. She then cut back on her smoking and self manipulated the dose of Hydrea to take the least amount possible.     2013, August - she quit smoking completely, switching to the E cigarette.  Hydrea was further decreased.    2013, December - she was down to Hydrea 500 mg every other day and she opted to discontinue it.    2014, July - hematocrit elevated at 50.7 and hemoglobin 16.8. She would only agree to restarting the Hydrea at 500 mg every other day. Four week follow up in mid-August, 2014, found her Hct responding to low dose Hydrea. Wishing to be on the lowest dose possible of Hydrea she wanted to try every 3rd day Hydrea, 500 mg, as she was able to be off Hydrea therapy for approximately 7 months before her hemoglobin and hematocrit lorenzo to the lever of being concerning and therapy resumed.   2014, September follow up found the Hct and HgB on the rise. She agreed to return to every other day dosing with Hydrea, 500 mg.    2015, January 19 - agreed to try phlebotomy and Hydrea was discontinued.     Prior phlebotomies:    2017 - 01/06, 02/01, 03/08, 04/14.    2016 - 10/28, 10/07, 09/21, 05/24, 02/05, 01/22.    2015 - 10/22, 07/22, 05/29, 05/18, 04/06, 03/09, 01/28.    09/08/17:    CBC - Hemoglobin 15.3, hematocrit 46.4. WBC and Plt WNL.      The patient feels good.  Tolerating phlebotomies but they make her very nervous.  She continues with E cigarettes  but has cut back.     She's been requiring about 6 phlebotomies/year.  She was turned down as a blood donor due to her diagnosis.  She is seriously contemplating returning to Hydrea therapy as she gets too anxious when needing a phlebotomy.  I strongly suggested that she reconsider due to long term use potentially associated with hematologic malignancies and she controls her Hct very well when doing a phlebotomy every couple months.  Also, we would need to see her more frequently with labs.  She will consider over the weekend and call us Monday.      I gave her a Rx for 0.5 mg Ativan to try and see if that would help calm her phlebotomy - associated anxieties.    Our goal is to keep Hct at about 42 or less.      Continue daily RS aspirin therapy.     09/19/17 - patient called and has decided to continue with intermittent phlebotomies.  Will schedule one this Thursday and she have have another before her next follow up.     12/08/17 - follow up with labs and potential phlebotomy.     ECOG Performance:     ECOG Performance Status: 0    Distress Assessment:    Distress Assessment Score: 2        TT > 25 minutes face to face with > 50% counseling and care coordination.    Luke Guerrero, CNP     CC: Lauren Haines MD  ______________________________________________________________________________    Interim History:    The patient presents today feeling and looking well.  She has not had any phlebotomies for several months as she was planning to donate, but told she couldn't.  She tolerates phlebotomies without incidence but becomes quite anxious before having them because she doesn't like needles. Her appetite, weight and performance status remain quite stable.  She denies nausea, vomiting, cough, fever, chills, headache, visual or mentation disturbance, bowel or bladder issues.  She continues with E cigarettes.  She had 7 - 500 ml phlebotomies in 2015, 6 in 2016 and 4 so far in 2017.    Pain Status:    Currently  in Pain: No/denies    Review of Systems:    Constitutional  Constitutional (WDL): All constitutional elements are within defined limits  Neurosensory  Neurosensory (WDL): All neurosensory elements are within defined limits  Cardiovascular  Cardiovascular (WDL): All cardiovascular elements are within defined limits  Pulmonary  Respiratory (WDL): Within Defined Limits  Gastrointestinal  Gastrointestinal (WDL): All gastrointestinal elements are within defined limits  Genitourinary  Genitourinary (WDL): All genitourinary elements are within defined limits  Integumentary  Integumentary (WDL): All integumentary elements are within defined limits  Patient Coping  Patient Coping: Accepting  Distress Assessment  Distress Assessment Score: 2  Accompanied by  Accompanied by: Alone    Past Histories:    Past Medical History:   Diagnosis Date     Osteoporosis      Parathyroid adenoma      Polycythemia vera      Vitamin D deficiency          Past Surgical History:   Procedure Laterality Date     CATARACT EXTRACTION Left      DILATION AND CURETTAGE OF UTERUS       KNEE SURGERY      torn meniscus     THYROIDECTOMY, PARTIAL N/A 12/2/2014    Procedure: RIGHT PARATHYROID EXCISION ;  Surgeon: Tressa Louis MD;  Location: [facility];  Service:        Physical Exam:    Recent Vitals 9/8/2017   Height -   Weight 128 lbs 10 oz   BSA (m2) -   /70   Pulse 92   Temp -   Temp src -   SpO2 98   Some recent data might be hidden     General:  Pleasant.  Anxious.  Alert and oriented.   HEENT:  Anicteric sclera. EOMI. OJ. No oral lesions.  Extremities:  No edema  Skin:    No rash  Chest:  Lungs clear.  CVS:  S1S2.  RRR.  No murmur heard  Abdomen: No organomegaly.  Lymph nodes: No palpable adenopathy neck, axilla or groin.    Lab Results:    Reviewed with patient.    Recent Results (from the past 24 hour(s))   HM2 (CBC W/O DIFF)   Result Value Ref Range    WBC 8.3 4.0 - 11.0 thou/uL    RBC 5.30 3.80 - 5.40 mill/uL     Hemoglobin 15.3 12.0 - 16.0 g/dL    Hematocrit 46.4 35.0 - 47.0 %    MCV 88 80 - 100 fL    MCH 28.9 27.0 - 34.0 pg    MCHC 33.0 32.0 - 36.0 g/dL    RDW 17.3 (H) 11.0 - 14.5 %    Platelets 255 140 - 440 thou/uL    MPV 9.6 8.5 - 12.5 fL       Imaging:    No results found.

## 2021-06-13 NOTE — PROGRESS NOTES
Assessment/Plan:        Diagnoses and all orders for this visit:    Lumbar radiculopathy  -     MR Lumbar Spine Without Contrast; Future; Expected date: 10/12/17        We will do an MRI of her lumbar spine.  Await results prior to further recommendations.  Avoid heavy exertion for now.  Tylenol as needed for pain.  She was agreeable with the plans.  Subjective:    Patient ID: Livier Bowers is a 76 y.o. female.    HPI    Livier is here with concerns about lower back pain radiating to her legs.  She has had chronic lower back pain but for the past 6 months, has been persistent and worse.  For the past 4 months, has been daily.  Starts in her left lower back radiating to her buttocks, posterior thigh and posterior lateral leg.  Tingling, achy, sore.  Pain is at 6/10.  She seems to be tilting forward with walking.  Laying down would feel good.  If she hyperextends her back, would be worse.  Vacuuming would also cause more discomfort or when she is using the stairs.  She feels that she is limping because of it.  Favoring her left leg and difficulty lifting her left leg.  Feels weak.  No episodes of falling.  No recent trauma, injury.  No history of surgery or fracture to her back spine before.  No incontinence.  No fever, recent weight loss.  No hematuria.  Has not been taking anything for it.    Review of Systems  As above otherwise negative.          Objective:    Physical Exam  /70 (Patient Site: Left Arm, Patient Position: Sitting, Cuff Size: Adult Regular)  Pulse 98  Wt 127 lb 1.6 oz (57.7 kg)  SpO2 98%  Breastfeeding? No  BMI 22.51 kg/m2    Vital signs noted above. AAO ×3.  HEENT no nasal discharge, moist oral mucosa. Neck: Supple neck, nonpalpable cervical lymph nodes.  Lungs: Clear to auscultation bilateral.  Heart: S1-S2 regular rate and rhythm, no murmurs were noted.  Abdomen: Flat, soft with bowel sounds and nontender.  Extremities: No edema, pulses were full and equal.  Lump noted in her left  lateral leg which seems benign, superficial.  2 x 2 centimeter.  Positive straight leg raising in her left.

## 2021-06-13 NOTE — PROGRESS NOTES
"Pt admitted 4+ anxious about her \"awful haircut\" and that she needs another phlebotomy as she is \"so unhappy with needles\" Pt requests staff to just do the phlebotomy and not to wait for labs. Discussed with Luke Alegria NP and ok given to do phlebotomy without waiting. Pt hydrated self with water prior to discharge.  "

## 2021-06-13 NOTE — PROGRESS NOTES
"Livier Bowers is a 79 y.o. female who is being evaluated via a billable telephone visit.      The patient has been notified of following:     \"This telephone visit will be conducted via a call between you and your physician/provider. We have found that certain health care needs can be provided without the need for a physical exam.  This service lets us provide the care you need with a short phone conversation.  If a prescription is necessary we can send it directly to your pharmacy.  If lab work is needed we can place an order for that and you can then stop by our lab to have the test done at a later time.    Telephone visits are billed at different rates depending on your insurance coverage. During this emergency period, for some insurers they may be billed the same as an in-person visit.  Please reach out to your insurance provider with any questions.    If during the course of the call the physician/provider feels a telephone visit is not appropriate, you will not be charged for this service.\"    Patient has given verbal consent to a Telephone visit? Yes    What phone number would you like to be contacted at? [phone]    Patient would like to receive their AVS by AVS Preference: Parul.    Laboratories:    Recent Results (from the past 168 hour(s))   Comprehensive Metabolic Panel   Result Value Ref Range    Sodium 143 136 - 145 mmol/L    Potassium 3.9 3.5 - 5.0 mmol/L    Chloride 109 (H) 98 - 107 mmol/L    CO2 25 22 - 31 mmol/L    Anion Gap, Calculation 9 5 - 18 mmol/L    Glucose 87 70 - 125 mg/dL    BUN 12 8 - 28 mg/dL    Creatinine 0.76 0.60 - 1.10 mg/dL    GFR MDRD Af Amer >60 >60 mL/min/1.73m2    GFR MDRD Non Af Amer >60 >60 mL/min/1.73m2    Bilirubin, Total 0.5 0.0 - 1.0 mg/dL    Calcium 8.6 8.5 - 10.5 mg/dL    Protein, Total 6.9 6.0 - 8.0 g/dL    Albumin 4.0 3.5 - 5.0 g/dL    Alkaline Phosphatase 65 45 - 120 U/L    AST 20 0 - 40 U/L    ALT 13 0 - 45 U/L   HM1 (CBC with Diff)   Result Value Ref Range "    WBC 6.3 4.0 - 11.0 thou/uL    RBC 4.05 3.80 - 5.40 mill/uL    Hemoglobin 14.9 12.0 - 16.0 g/dL    Hematocrit 45.5 35.0 - 47.0 %     (H) 80 - 100 fL    MCH 36.8 (H) 27.0 - 34.0 pg    MCHC 32.7 32.0 - 36.0 g/dL    RDW 13.1 11.0 - 14.5 %    Platelets 242 140 - 440 thou/uL    MPV 9.7 8.5 - 12.5 fL    Neutrophils % 69 50 - 70 %    Lymphocytes % 21 20 - 40 %    Monocytes % 8 2 - 10 %    Eosinophils % 1 0 - 6 %    Basophils % 1 0 - 2 %    Immature Granulocyte % 0 <=0 %    Neutrophils Absolute 4.4 2.0 - 7.7 thou/uL    Lymphocytes Absolute 1.3 0.8 - 4.4 thou/uL    Monocytes Absolute 0.5 0.0 - 0.9 thou/uL    Eosinophils Absolute 0.0 0.0 - 0.4 thou/uL    Basophils Absolute 0.1 0.0 - 0.2 thou/uL    Immature Granulocyte Absolute 0.0 <=0.0 thou/uL     Review of Systems:    Constitutional  Constitutional (WDL): All constitutional elements are within defined limits  Neurosensory  Neurosensory (WDL): All neurosensory elements are within defined limits  Eye   Eye Disorder (WDL): Assessment not pertinent to visit  Ear  Ear Disorder (WDL): Assessment not pertinent to visit  Cardiovascular  Cardiovascular (WDL): All cardiovascular elements are within defined limits  Pulmonary  Respiratory (WDL): Within Defined Limits  Gastrointestinal  Gastrointestinal (WDL): All gastrointestinal elements are within defined limits  Genitourinary  Genitourinary (WDL): All genitourinary elements are within defined limits  Lymphatic  Lymph (WDL): All lymph disorder elements are within defined limits  Musculoskeletal and Connective Tissue  Musculoskeletal and Connetive Tissue Disorders (WDL): Exceptions to WDL(general arthritis)  Arthralgia: Mild pain  Integumentary  Integumentary (WDL): All integumentary elements are within defined limits  Patient Coping  Patient Coping: Accepting;Open/discussion  Distress Assessment  Distress Assessment Score: 3  Accompanied by     Oral Chemo Adherence       History:    1. 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     2010 - hemoglobin 18 and hematocrit of 53. Serum EPO was low normal. Phlebotomy was recommended, but she declined. Sleep study was unremarkable.  Hydroxyurea was initiated at 500 mg daily; however, ineffective and therefore increased to 500 mg twice daily. She then cut back on her smoking and self manipulated the dose of Hydrea to take the least amount possible.     2013, August - she quit smoking completely, switching to the E cigarette.  Hydrea was further decreased.    2013, December - she was down to Hydrea 500 mg every other day and she opted to discontinue it.    2014, July - hematocrit elevated at 50.7 and hemoglobin 16.8. She would only agree to restarting the Hydrea at 500 mg every other day. Four week follow up in mid-August, 2014, found her Hct responding to low dose Hydrea. Wishing to be on the lowest dose possible of Hydrea she wanted to try every 3rd day Hydrea, 500 mg, as she was able to be off Hydrea therapy for approximately 7 months before her hemoglobin and hematocrit lorenzo to the lever of being concerning and therapy resumed.   2014, September follow up found the Hct and HgB on the rise. She agreed to return to every other day dosing with Hydrea, 500 mg.    2015, January 19 - agreed to try phlebotomy and Hydrea was discontinued.   ? Prior phlebotomies:  ? 2020 - 03/09, 01/07.  ? 2019 - 01/19, 03/20, 06/12, 09/10, 10/08, 11/05.  ? 2018 - 02/08, 04/25, 06/26, 08/29, 11/06.  ? 2017 - 01/06, 02/01, 03/08, 04/14, 09/21, 11/02, 12/14.  ? 2016 - 10/28, 10/07, 09/21, 05/24, 02/05, 01/22.  ? 2015 - 10/22, 07/22, 05/29, 05/18, 04/06, 03/09, 01/28.  ? 03/09/20 - With onset of iron deficiency, stopped phlebotomies.  Not anemic.  Resumed Hydrea, 500 mg every other day.   ? 03/16/20 Erythropoietin - WNL @ 11.  ? If EPO low - BM bx.  If EPO high - likely r/t her COPD.  ? 03/23/20 Jak2 exon12, MPL and ADI testing - no mutations  "identified.     04/20/20 - Hydrea increased to 500 mg daily due to increased HcT @ 46.8.     Assessment and Plan:    1. Polycythemia, JAK2 and BCR-ABL negative:     79 y.o.     12/22/2020:    CMP - basically WNL.    CBC- WBC and PLTs WNL.  Hemoglobin 14.9.  Hematocrit 45.5.      Suma states she's been feeling good.  She's tolerating current dosing of Hydrea well and without known side effects.  she states her appetite, weight and performance status are very stable.  She's adhering to covid restrictions and precautions.  She denies pain, cough, fever, chills, unusual headaches, visual or mentation disturbance, bowel or bladder issues.      With hematocrit remaining stable ~45 and our goal is < 42, she has agreed to increase the Hydrea dose in \"baby steps\".      Will increase Hydrea from 500 mg/day to 500 mg/day Monday through Friday, 1000 mg on Sunday.  She was not agreeable to 1000 mg bot Saturday and Sunday.    Potential/likely toxicities and s/e reviewed.      Continue daily aspirin therapy.  She can only tolerate baby aspirin as RS aspirin causes her stomach to feel poorly.  Instructed to take ASA with a meal.     I reviewed [facility] covid-19 restrictions and precautions and encouraged to follow state and federal recommendations.    Suma refuses the seasonal flu vaccine despite covid19 - stating she has never had the flu.    2-month follow up with HEME1 and CMP.      Phone call duration: 18 minutes  Start:1:00 pm  Stop: 1:18 pm    FARNAZ Costello, SURINDER        "

## 2021-06-14 NOTE — PROGRESS NOTES
[facility] Daily Progress     Patient Name: Livier Bowers  Date: 11/21/2017  Visit #: 2  PTA visit #:  1  Referral Diagnosis: Chronic low back pain with left-sided sciatica  Referring provider: Lauren Haines MD  Visit Diagnosis:     ICD-10-CM    1. Chronic low back pain with left-sided sciatica M54.42     G89.29    2. Weakness of left hip R29.898    3. Poor posture R29.3    4. Chronic left-sided low back pain with left-sided sciatica M54.42     G89.29          Assessment:   Pt was seen today for her first follow up visit. Pt requires verbal and tactile cues when performing the PPT.   Pt demonstrates tight hip ER and IR B.  Patient is benefitting from skilled physical therapy and is making steady progress toward functional goals.  Patient is appropriate to continue with skilled physical therapy intervention, as indicated by initial plan of care.    Goal Status: On going  Pt. will demonstrate/verbalize independence in self-management of condition in : 4 weeks  Pt will: be able to walk up and down stairs step over step in 4 weeks  Pt will:  be able to walk 20 minutes without increased pain in 4 weeks.    Plan / Patient Education:     Continue with initial plan of care.  Progress with home program as tolerated.  Go over HEP, especially PPT.  Progress core and hip strengthening and flexibility.    Subjective:   Pt states her back and LE are feeling better.  Pt reports the L LE symptoms varies from pain, numbness and tingling. She is not having L LE symptoms today.  Pain Rating: 3        Objective:     Pt ambulates with trunk and hips in slight forward flexion.    Treatment Today     TREATMENT MINUTES COMMENTS   Evaluation     Self-care/ Home management     Manual therapy     Neuromuscular Re-education     Therapeutic Activity     Therapeutic Exercises 25 See flow sheet for exs performed today  Added to HEP:  -LTR  -Bent knee fall outs  -mini bridge with ab and glut sets  -clamshell L       Gait  training     Modality__________________                Total     Blank areas are intentional and mean the treatment did not include these items.       Brinda Cruz,CLT  11/21/2017

## 2021-06-14 NOTE — PROGRESS NOTES
[facility]   [facility] Initial Evaluation        [facility] Certification Request    November 17, 2017      Patient: Livier Bowers  MR Number: [id number]  YOB: 1940  Date of Visit: 11/17/2017      Dear Dr. Lauren Haines:    Thank you for this referral.   We are seeing Livier Bowers for Physical Therapy of chronic low back pain with left radicular pain.    Medicare and/or Medicaid requires physician review and approval of the treatment plan. Please review the plan of care and verify that you agree with the therapy plan of care by co-signing this note.      Plan of Care  Authorization / Certification Start Date: 11/17/17  Authorization / Certification End Date: 02/14/18  Communication with: Referral Source  Patient Related Instruction: Nature of Condition;Body mechanics;Posture;Treatment plan and rationale;Precautions;Self Care instruction;Expected outcome;Next steps;Basis of treatment  Times per Week: 1-2  Number of Weeks: 4-6  Number of Visits: 10-12  Discharge Planning: to include self management  Precautions / Restrictions : spondylolithesis  Therapeutic Exercise: ROM;Stretching;Strengthening  Manual Therapy: soft tissue mobilization;myofascial release;joint mobilization;muscle energy    Goals:  Pt. will demonstrate/verbalize independence in self-management of condition in : 4 weeks  Pt will: be able to walk up and down stairs step over step in 4 weeks  Pt will:  be able to walk 20 minutes without increased pain in 4 weeks.      If you have any questions or concerns, please don't hesitate to call.    Sincerely,      Nannette Contreras, PT        Physician recommendation:     _x__ Follow therapist's recommendation        ___ Modify therapy      *Physician co-signature indicates they certify the need for these services furnished within this plan and while under their care.          Patient Name: Livier Bowers  Date of evaluation: 11/17/2017  Referral Diagnosis: low back  pain  Referring provider: Lauren Haines MD  Visit Diagnosis:     ICD-10-CM    1. Chronic low back pain with left-sided sciatica M54.42     G89.29    2. Weakness of left hip R29.898    3. Poor posture R29.3        Assessment:      Pt. is appropriate for skilled PT intervention as outlined in the Plan of Care (POC).  Pt. is a good candidate for skilled PT services to improve pain levels and function.    Goals:  Pt. will demonstrate/verbalize independence in self-management of condition in : 4 weeks  Pt will: be able to walk up and down stairs step over step in 4 weeks  Pt will:  be able to walk 20 minutes without increased pain in 4 weeks.    Patient's expectations/goals are realistic.    Barriers to Learning or Achieving Goals:  No Barriers.    Goals and plan or care were set up in collaboration with the patient.    Presents with chronic low back pain, left radicular symptoms.  Pain is increased with extension activities.  Has weakness in the left hip as well.       Plan / Patient Instructions:        Plan of Care:   Authorization / Certification Start Date: 11/17/17  Authorization / Certification End Date: 02/14/18  Communication with: Referral Source  Patient Related Instruction: Nature of Condition;Body mechanics;Posture;Treatment plan and rationale;Precautions;Self Care instruction;Expected outcome;Next steps;Basis of treatment  Times per Week: 1-2  Number of Weeks: 4-6  Number of Visits: 10-12  Discharge Planning: to include self management  Precautions / Restrictions : spondylolithesis  Therapeutic Exercise: ROM;Stretching;Strengthening  Manual Therapy: soft tissue mobilization;myofascial release;joint mobilization;muscle energy    Plan for next visit: Continue with exercise to increase abdominal strength in all positions, hip strength and flexibility of the hips and thoracic spine.     Subjective:           History of Present Illness:    Livier is a 76 y.o. female who presents to therapy today with  "complaints of left low back pain. Date of onset/duration of symptoms is 10/17  Pain has gotten worse recently.  Has pain with walking. 15 - 20 minutes. Pain is better with sitting.  Has less pain when leaning on a shopping cart.  Feels she is leaning forward when she walks.  Also has pain when walking on stairs.  Walks one step at a time rather than step over step.  Legs feel heavy and like they are \"lead\".  Also has difficulty vacuuming.  Has numbness in her left leg with prolonged standing and walking.  Numbness does not go below the knee.  Pain is also worse with taking care of her grand children.  Describes having had this pain for 3 years.  describes her previous level of function as not limited   Had an MRI which showed spondylolisthesis.    Pain Ratin    Functional limitations are described as occurring with:   walking, on level surface and stairs, vacumming          Objective:      Note: Items left blank indicates the item was not performed or not indicated at the time of the evaluation.    APTA sit to stand test = 7 times in 30 seconds.      Patient Outcome Measures :    Modified Oswestry Low Back Pain Disablity Questionnaire  in %: 36   Scores range from 0-100%, where a score of 0% represents minimal pain and maximal function. The minimal clinically important difference is a score reduction of 12%.        Examination  1. Chronic low back pain with left-sided sciatica     2. Weakness of left hip     3. Poor posture       Precautions/Restrictions: None  Involved side: Left  Posture Observation:      General sitting posture is  fair.  General standing posture is poor.  Lumbopelvic complex: Moderately decreased lumbar lordosis  flexed forward posture in standing    Lumbar ROM:    Date:      *Indicate scale AROM AROM AROM   Lumbar Flexion Min limited     Lumbar Extension Max limited with pain in left buttock      Right Left Right Left Right Left   Lumbar Sidebending Min limited Min limited       Lumbar " Rotation         Thoracic Flexion      Thoracic Extension      Thoracic Sidebending         Thoracic Rotation           Lower Extremity Strength:     Date:      LE strength/5 Right Left Right Left Right Left   Hip Flexion (L1-3) 4 4       Hip Extension (L5-S1) 3 with pain on left side 4       Hip Abduction (L4-5) 4 3+       Hip Adduction (L2-3)         Hip External Rotation         Hip Internal Rotation         Knee Extension (L3-4) 5 5       Knee Flexion 5 5       Ankle Dorsiflexion (L4-5) 5 5       Great Toe Extension (L5) 5 5       Ankle Plantar flexion (S1)         Abdominals        Sensation        Reflex Testing  Lumbar Dermatomes Right Left UE Reflexes Right Left   Iliac Crest and Groin (L1)   Biceps (C5-6)     Anterior Medial Thigh (L2)   Brachioradialis (C5-6)     Anterior Thigh, Medial Epicondyle Femur (L3)   Triceps (C7-8)     Lateral Thigh, Anterior Knee, Medial Leg/Malleolus (L4)   Marta s test     Lateral Leg, Dorsal Foot (L5)   LE Reflexes     Lateral Foot (S1)   Patellar (L3-4)     Posterior Leg (S2)   Achilles (S1-2)     Other:   Babinski Response       Palpation:  Non tender in lumbar paraspinals, and left lateral back and hip area.    Lumbar Special Tests:     Lumbar Special Tests Right Left SI Tests Right  Left   Quadrant test   SI Compression     Straight leg raise negative negative SI Distraction     Crossover response negative  POSH Test     Slump   Sacral Thrust     Sit-up test  FADIR     Trunk extensor endurance test  Resisted Abduction     Prone instability test  Other:     Pubic shotgun  Other:       Repeated Motion Testing:  Peripheralizes with Extension  Does not centralize    Passive Mobility - Joint Integrity:  non tender with PA mobs of lumbar spine    LE Screen:  bilateral hip ROM = WNL    Treatment Today     TREATMENT MINUTES COMMENTS   Evaluation 30 Low   Self-care/ Home management     Manual therapy     Neuromuscular Re-education     Therapeutic Activity     Therapeutic  Exercises 25 Exercises per flow sheet   Gait training     Modality__________________                Total 55    Blank areas are intentional and mean the treatment did not include these items.     PT Evaluation Code: (Please list factors)  Patient History/Comorbidities: osteoporsis  Examination: low back pain, weak abdominals and hips and legs  Clinical Presentation: stable  Clinical Decision Making: low    Patient History/  Comorbidities Examination  (body structures and functions, activity limitations, and/or participation restrictions) Clinical Presentation Clinical Decision Making (Complexity)   No documented Comorbidities or personal factors 1-2 Elements Stable and/or uncomplicated Low   1-2 documented comorbidities or personal factor 3 Elements Evolving clinical presentation with changing characteristics Moderate   3-4 documented comorbidities or personal factors 4 or more Unstable and unpredictable High                Nannette Contreras, PT  11/17/2017  3:00 PM

## 2021-06-14 NOTE — PROGRESS NOTES
"[facility] Daily Progress     Patient Name: Livier Bowers  Date: 2017  Visit #: 4  PTA visit #:  1  Referral Diagnosis: Chronic low back pain with left-sided sciatica  Referring provider: Lauren Haines MD  Visit Diagnosis:     ICD-10-CM    1. Weakness of left hip R29.898    2. Poor posture R29.3    3. Chronic left-sided low back pain with left-sided sciatica M54.42     G89.29          Assessment:   Patient is benefitting from skilled physical therapy and is making steady progress toward functional goals.  Patient is appropriate to continue with skilled physical therapy intervention, as indicated by initial plan of care.    Goal Status: On going  Pt. will demonstrate/verbalize independence in self-management of condition in : 4 weeks  Pt will: be able to walk up and down stairs step over step in 4 weeks  Pt will:  be able to walk 20 minutes without increased pain in 4 weeks. - met      Decreased pain overall and decreased pain after therapy. Continues to present with flexed forward posture.  Plan / Patient Education:     Continue with initial plan of care.  Progress with home program as tolerated.     Limit number of exercises as patient is starting to feel slightly overwhelmed with the number of exercises.    Subjective:   Feeling a little bit better.  Was able to do a lot of shopping yesterday.  Has a deep \"hot\" pain in the left low back but this is better overall too.  Has restarted all of the exercises without any problems.      Pain Ratin      Objective:       Point tender along the left lumbar paraspinals and left quadratus lumborum.  Pain in the left buttock with kneeling hip flexor stretch on the right.  Negative slump test on the left.    Treatment Today     TREATMENT MINUTES COMMENTS   Evaluation     Self-care/ Home management     Manual therapy 15 STM to left lumbar paraspinals in right side lying bending them away from the lumbar spine.  STM to left quadratus lumborum in " "right side lying with manual stretch on this.   Neuromuscular Re-education     Therapeutic Activity     Therapeutic Exercises 10 Exercises:  Exercise #1: pelvic tilt 5 second hold 5 times 2 times per day  Exercise #2: left hip abduction in right sidelying 10 - 15 times 2 times per day.  Exercise #3: sit to stand 10 - 15 times  3 times per day  Exercise #4: LTR  Comment #4: 5\"x 10 B  Exercise #5: Bent knee fall outs  Comment #5: x10 B  Exercise #6: mini bridge  Comment #6: x5  Exercise #7: clamshell  Comment #7: x10 L  Exercise #8: hip flexor stretch in kneeling 30 second hold 2-3 times a day.       Gait training     Modality__________________                Total 25    Blank areas are intentional and mean the treatment did not include these items.       Nannette Lopez,CLT  12/13/2017      "

## 2021-06-14 NOTE — PROGRESS NOTES
[facility] Daily Progress     Patient Name: Livier Bowers  Date: 2017  Visit #: 3  PTA visit #:  1  Referral Diagnosis: Chronic low back pain with left-sided sciatica  Referring provider: Lauren Haines MD  Visit Diagnosis:     ICD-10-CM    1. Weakness of left hip R29.898    2. Poor posture R29.3    3. Chronic left-sided low back pain with left-sided sciatica M54.42     G89.29          Assessment:   Pt was seen today for her first follow up visit. Pt requires verbal and tactile cues when performing the PPT.   Pt demonstrates tight hip ER and IR B.  Patient is benefitting from skilled physical therapy and is making steady progress toward functional goals.  Patient is appropriate to continue with skilled physical therapy intervention, as indicated by initial plan of care.    Goal Status: On going  Pt. will demonstrate/verbalize independence in self-management of condition in : 4 weeks  Pt will: be able to walk up and down stairs step over step in 4 weeks  Pt will:  be able to walk 20 minutes without increased pain in 4 weeks.    Plan / Patient Education:     Continue with initial plan of care.  Progress with home program as tolerated.     Patient with more pain over the last few days.  Feels it may be from most recent exercises.  Had a decrease in pain after therapy.  Was told to restart exercises gradually.    Subjective:   Back pain has gotten worse over the last few days.  Continues to have pain with standing and walking.  Has been holding off on the exercises the last few days.  No pain when doing the exercises.  Feels the increased pain is from the new exercises.      Pain Ratin        Objective:     Pt ambulates with trunk and hips in slight forward flexion.  Point tender along the left lumbar paraspinals and left quadratus lumborum.    Treatment Today     TREATMENT MINUTES COMMENTS   Evaluation     Self-care/ Home management     Manual therapy 15 STM to left lumbar paraspinals in  "right side lying bending them away from the lumbar spine.  STM to left quadratus lumborum in right side lying with manual stretch on this.   Neuromuscular Re-education     Therapeutic Activity     Therapeutic Exercises 10 Exercises:  Exercise #1: pelvic tilt 5 second hold 5 times 2 times per day  Exercise #2: left hip abduction in right sidelying 10 - 15 times 2 times per day.  Exercise #3: sit to stand 10 - 15 times  3 times per day  Exercise #4: LTR  Comment #4: 5\"x 10 B  Exercise #5: Bent knee fall outs  Comment #5: x10 B  Exercise #6: mini bridge  Comment #6: x5  Exercise #7: clamshell  Comment #7: x10 L    Reviewed above exercises and instructed patient to gradually restart them as able.     Gait training     Modality__________________                Total 25    Blank areas are intentional and mean the treatment did not include these items.       Nannette Lopez,CLT  11/29/2017      "

## 2021-06-14 NOTE — PROGRESS NOTES
[facility] Progress Note    Patient: Livier Bowers  MRN: [id number]  Date of Service: 12/14/2017         Reason for Visit:    Follow up polycythemia (appears secondary).    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     Appears secondary, likely COPD.     2010 - hemoglobin 18 and hematocrit of 53. Serum EPO was low normal. Phlebotomy was recommended, but she declined. Sleep study was unremarkable.  Hydroxyurea was initiated at 500 mg daily; however, ineffective and therefore increased to 500 mg twice daily. She then cut back on her smoking and self manipulated the dose of Hydrea to take the least amount possible.     2013, August - she quit smoking completely, switching to the E cigarette.  Hydrea was further decreased.    2013, December - she was down to Hydrea 500 mg every other day and she opted to discontinue it.    2014, July - hematocrit elevated at 50.7 and hemoglobin 16.8. She would only agree to restarting the Hydrea at 500 mg every other day. Four week follow up in mid-August, 2014, found her Hct responding to low dose Hydrea. Wishing to be on the lowest dose possible of Hydrea she wanted to try every 3rd day Hydrea, 500 mg, as she was able to be off Hydrea therapy for approximately 7 months before her hemoglobin and hematocrit lorenzo to the lever of being concerning and therapy resumed.   2014, September follow up found the Hct and HgB on the rise. She agreed to return to every other day dosing with Hydrea, 500 mg.    2015, January 19 - agreed to try phlebotomy and Hydrea was discontinued.     Prior phlebotomies:    2017 - 01/06, 02/01, 03/08, 04/14, 09/21, 11/02, 12/14.    2016 - 10/28, 10/07, 09/21, 05/24, 02/05, 01/22.    2015 - 10/22, 07/22, 05/29, 05/18, 04/06, 03/09, 01/28.    12/14/17:    Hemoglobin 15.7, hematocrit 47.7.      The patient feels good.  Tolerating phlebotomies but they make her very nervous.  She continues with E cigarettes  but has cut back.     She's been requiring about 6 phlebotomies/year.  She was turned down as a blood donor due to her diagnosis.  We again discussed the risks of Hydrea therapy as compared to a phlebotomy.  She controls her Hct very well when doing a phlebotomy every couple months.  Also, we would need to see her more frequently with labs.        She has a Rx for 0.5 mg Ativan to try and see if that would help calm her phlebotomy - associated anxieties.  She used it once but afraid she will need a  so chooses not to use.    Our goal is to keep Hct at about 42 or less.      Continue daily aspirin therapy.  She cannot tolerate RS aspirin as it causes her stomach to feel poorly, so she will try a a baby ASA with each meal.    Will plan for a 500 ml phlebotomy today, in 2 months and in 4 months with follow up appointment; this will give her a phlebotomy every 2 months.    Hct and HgB with each phlebotomy.     12/08/17 - follow up with labs and potential phlebotomy.     ECOG Performance:     ECOG Performance Status: 0    Distress Assessment:             TT > 20 minutes face to face with > 50% counseling and care coordination.    Luke Guerrero, CNP     CC: Lauren Haines MD  ______________________________________________________________________________    Interim History:    The patient presents today feeling and looking well.  Her last phlebotomy was about 6 weeks ago.  She tolerates phlebotomies without incidence but becomes quite anxious before having them because she doesn't like needles. Her appetite, weight and performance status remain quite stable.  She denies nausea, vomiting, cough, fever, chills, headache, visual or mentation disturbance, bowel or bladder issues.  She continues with E cigarettes.  She had 7 - 500 ml phlebotomies in 2015, 6 in 2016 and 6 so far in 2017.    Pain Status:    Currently in Pain: No/denies    Review of Systems:    Constitutional  Fatigue: None  Fever: None  Chills:  "None  Weight Gain: None  Weight Loss: None  Neurosensory  Neurosensory (WDL): All neurosensory elements are within defined limits  Cardiovascular  Cardiovascular (WDL): All cardiovascular elements are within defined limits  Pulmonary  Respiratory (WDL): Within Defined Limits  Cough: None  Dyspnea: None  Hypoxia: None  Gastrointestinal  Anorexia: None  Constipation: None  Diarrhea: None  Dysphagia: None  Esophagitis: None  Nausea: None  Pharyngitis: None  Vomiting: None  Dysgeusia: None  Dry Mouth: None  Genitourinary  Genitourinary (WDL): All genitourinary elements are within defined limits  Integumentary  Integumentary (WDL): All integumentary elements are within defined limits  Patient Coping  Patient Coping: Accepting  Distress Assessment     Accompanied by       Past Histories:    Past Medical History:   Diagnosis Date     Osteoporosis      Parathyroid adenoma      Polycythemia vera      Vitamin D deficiency          Past Surgical History:   Procedure Laterality Date     CATARACT EXTRACTION Left      DILATION AND CURETTAGE OF UTERUS       KNEE SURGERY      torn meniscus     THYROIDECTOMY, PARTIAL N/A 12/2/2014    Procedure: RIGHT PARATHYROID EXCISION ;  Surgeon: Tressa Louis MD;  Location: [facility];  Service:        Physical Exam:    Recent Vitals 12/14/2017   Height 5' 3\"   Weight 127 lbs 10 oz   BSA (m2) 1.6 m2   /67   Pulse 111   Temp -   Temp src -   SpO2 97   Some recent data might be hidden     General:  Pleasant.  Anxious due to knowing this is a phlebotomy day.  Alert and oriented.   HEENT:  Anicteric sclera. EOMI. OJ. No oral lesions.  Extremities:  No edema  Skin:    No rash  Chest:  Lungs clear.  CVS:  S1S2.  RRR.  No murmur heard  Abdomen: No organomegaly.  Lymph nodes: No palpable adenopathy neck, axilla or groin.    Lab Results:    Reviewed with patient.    Recent Results (from the past 24 hour(s))   Hemoglobin   Result Value Ref Range    Hemoglobin 15.7 12.0 - " 16.0 g/dL   Hematocrit   Result Value Ref Range    Hematocrit 47.7 (H) 35.0 - 47.0 %         Imaging:    No results found.

## 2021-06-15 NOTE — TELEPHONE ENCOUNTER
Refill request received via fax from [facility] for hydroxyurea.  Last refilled by BRIAN Guerrero CNP on 2/23/21. Quantity #105.  No refills.    Called [facility].They didn't receive. Verbal order given:   Hydroxyurea:  Take 1 cap (500 mg) Monday through Saturday, 2-tabs (1000 mg) on Sunday/IBAN Andrade RN       
no

## 2021-06-15 NOTE — PROGRESS NOTES
"Livier Bowers is a 80 y.o. female who is being evaluated via a billable telephone visit.      The patient has been notified of following:     \"This telephone visit will be conducted via a call between you and your physician/provider. We have found that certain health care needs can be provided without the need for a physical exam.  This service lets us provide the care you need with a short phone conversation.  If a prescription is necessary we can send it directly to your pharmacy.  If lab work is needed we can place an order for that and you can then stop by our lab to have the test done at a later time.    Telephone visits are billed at different rates depending on your insurance coverage. During this emergency period, for some insurers they may be billed the same as an in-person visit.  Please reach out to your insurance provider with any questions.    If during the course of the call the physician/provider feels a telephone visit is not appropriate, you will not be charged for this service.\"    Patient has given verbal consent to a Telephone visit? Yes    What phone number would you like to be contacted at? [phone]    Patient would like to receive their AVS by AVS Preference: Parul.    Additional provider notes:     Laboratories:    Recent Results (from the past 24 hour(s))   HM1 (CBC with Diff)   Result Value Ref Range    WBC 6.7 4.0 - 11.0 thou/uL    RBC 3.73 (L) 3.80 - 5.40 mill/uL    Hemoglobin 14.4 12.0 - 16.0 g/dL    Hematocrit 42.1 35.0 - 47.0 %     (H) 80 - 100 fL    MCH 38.6 (H) 27.0 - 34.0 pg    MCHC 34.2 32.0 - 36.0 g/dL    RDW 13.2 11.0 - 14.5 %    Platelets 215 140 - 440 thou/uL    MPV 9.4 8.5 - 12.5 fL    Neutrophils % 66 50 - 70 %    Lymphocytes % 24 20 - 40 %    Monocytes % 9 2 - 10 %    Eosinophils % 1 0 - 6 %    Basophils % 1 0 - 2 %    Immature Granulocyte % 0 <=0 %    Neutrophils Absolute 4.4 2.0 - 7.7 thou/uL    Lymphocytes Absolute 1.6 0.8 - 4.4 thou/uL    Monocytes " Absolute 0.6 0.0 - 0.9 thou/uL    Eosinophils Absolute 0.1 0.0 - 0.4 thou/uL    Basophils Absolute 0.1 0.0 - 0.2 thou/uL    Immature Granulocyte Absolute 0.0 <=0.0 thou/uL       Review of Systems:    Constitutional  Constitutional (WDL): All constitutional elements are within defined limits  Neurosensory  Neurosensory (WDL): All neurosensory elements are within defined limits  Eye   Eye Disorder (WDL): Assessment not pertinent to visit  Ear  Ear Disorder (WDL): Assessment not pertinent to visit  Cardiovascular  Cardiovascular (WDL): All cardiovascular elements are within defined limits  Pulmonary  Respiratory (WDL): Within Defined Limits  Gastrointestinal  Gastrointestinal (WDL): All gastrointestinal elements are within defined limits  Genitourinary  Genitourinary (WDL): All genitourinary elements are within defined limits  Lymphatic  Lymph (WDL): Assessment not pertinent to visit  Musculoskeletal and Connective Tissue  Musculoskeletal and Connetive Tissue Disorders (WDL): Exceptions to WDL  Arthralgia: Mild pain  Integumentary  Integumentary (WDL): All integumentary elements are within defined limits  Patient Coping  Patient Coping: Open/discussion  Distress Assessment  Distress Assessment Score: No distress  Accompanied by  Accompanied by: Alone  Oral Chemo Adherence       History:     1.           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 Serum EPO was low normal.     Polycythemia initially appeared secondary, likely to COPD.    Phlebotomy was recommended, but she declined.     Sleep study was unremarkable.      Hydroxyurea was initiated at 500 mg daily; however, ineffective and therefore increased to 500 mg twice daily.     She then cut back on her smoking and self manipulated the dose of Hydrea to take the least amount possible.     2013, August - she quit smoking completely, switching to the E cigarette.  Hydrea was further decreased.    2013, December - she was  down to Hydrea 500 mg every other day and she opted to discontinue it.    2014, July - hematocrit elevated at 50.7 and hemoglobin 16.8.  She would only agree to restarting the Hydrea at 500 mg every other day.     Four week follow up in mid-August, 2014, found her Hct responding to low dose Hydrea. Wishing to be on the lowest dose possible of Hydrea she wanted to try every 3rd day Hydrea, 500 mg, as she was able to be off Hydrea therapy for approximately 7 months before her hemoglobin and hematocrit lorenzo to the lever of being concerning and therapy resumed.       2014, September follow up found the Hct and HgB on the rise. She agreed to return to every other day dosing with Hydrea, 500 mg.    2015, January 19 - agreed to try phlebotomy and Hydrea was discontinued.     Prior phlebotomies:    2020 - 03/09, 01/07.    2019 - 01/19, 03/20, 06/12, 09/10, 10/08, 11/05.    2018 - 02/08, 04/25, 06/26, 08/29, 11/06.    2017 - 01/06, 02/01, 03/08, 04/14, 09/21, 11/02, 12/14.    2016 - 10/28, 10/07, 09/21, 05/24, 02/05, 01/22.    2015 - 10/22, 07/22, 05/29, 05/18, 04/06, 03/09, 01/28.    03/09/20 - With onset of iron deficiency, stopped phlebotomies.  Not anemic.  Resumed Hydrea, 500 mg every other day.     03/16/20 Erythropoietin - WNL @ 11.    If EPO low - BM bx.  If EPO high - likely r/t her COPD.    03/23/20 Jak2 exon12, MPL and ADI testing - no mutations identified.     04/20/2020 - Hydrea increased to 500 mg daily due to increased HcT @ 46.8.     12/22/2020 - Hydrea increased to 500 mg Monday - Saturday with 1000 mg on Sunday due to HcT @ 45.5.      Assessment and Plan:     1.         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     02/23/21:    CBC- WBC and PLTs WNL.  Hemoglobin 14.4.  Hematocrit decreased to 42.1.      Suma states she's been feeling good.  She's tolerating current dosing of Hydrea well and without known side effects.  no signs/symptoms of infection.  Her appetite, weight and performance status  are very stable.  She's adhering to covid restrictions and precautions.  She denies pain, cough, fever, chills, unusual headaches, visual or mentation disturbance, bowel or bladder issues, rash.      I reviewed Livier's labs with her.  Her hematocrit has dropped to nearly our goal of < 42 with increasing Hydrea from 500 mg/day to 500 mg/day Monday through Saturday and 1000 mg on Sunday.    Livier is tolerating current Hydrea dosing without incidence.     Continue Hydrea @ 500 mg/day Monday through Saturday and 1000 mg on Sunday - Rx renewed for 3 months.    Continue daily aspirin therapy.  She can only tolerate baby aspirin as RS aspirin causes her stomach to feel poorly.  Instructed to take ASA with a meal.     I reviewed [facility] covid-19 restrictions and precautions and encouraged to follow state and federal recommendations.    Suma refuses the seasonal flu vaccine despite covid19 - stating she has never had the flu.  She is considering receiving the SARS-covid vaccine.    3-month follow up with HEME1 and CMP.          Phone call duration 19 minutes.    Christina Maharaj, RN     Luke Guerrero, CNP

## 2021-06-16 PROBLEM — F41.9 ANXIETY: Status: ACTIVE | Noted: 2019-09-10

## 2021-06-16 PROBLEM — M75.101 ROTATOR CUFF TEAR ARTHROPATHY OF RIGHT SHOULDER: Status: ACTIVE | Noted: 2018-06-06

## 2021-06-16 PROBLEM — M12.811 ROTATOR CUFF TEAR ARTHROPATHY OF RIGHT SHOULDER: Status: ACTIVE | Noted: 2018-06-06

## 2021-06-16 NOTE — PROGRESS NOTES
[facility] Discharge Summary  Patient Name: Livier Bowers  Date: 3/2/2018  Referral Diagnosis: Chronic low back pain with left sided sciatica  Referring provider: Lauren Haines MD  Visit Diagnosis:   1. Weakness of left hip     2. Poor posture     3. Chronic left-sided low back pain with left-sided sciatica         Goals:  Pt. will demonstrate/verbalize independence in self-management of condition in : 4 weeks - met  Pt will: be able to walk up and down stairs step over step in 4 weeks - ongoing  Pt will:  be able to walk 20 minutes without increased pain in 4 weeks. - met      Patient was seen for 4 visits from 11/17/17 to 12/13/17.    The patient attended therapy initially, but did not finish the therapy sessions prescribed.  Goals were not fully achieved. Explanation for goals not achieved - patient did not complete therapy sessions  Patient received a home program for strengthening and stretching  The patient discontinued therapy, did not return.    Therapy will be discontinued at this time.  The patient will need a new referral to resume.    Thank you for your referral.  Nannette Contreras  3/2/2018  12:06 PM

## 2021-06-17 NOTE — PROGRESS NOTES
"Livier Bowers is a 80 y.o. female who is being evaluated via a billable telephone visit.      The patient has been notified of following:     \"This telephone visit will be conducted via a call between you and your physician/provider. We have found that certain health care needs can be provided without the need for a physical exam.  This service lets us provide the care you need with a short phone conversation.  If a prescription is necessary we can send it directly to your pharmacy.  If lab work is needed we can place an order for that and you can then stop by our lab to have the test done at a later time.    Telephone visits are billed at different rates depending on your insurance coverage. During this emergency period, for some insurers they may be billed the same as an in-person visit.  Please reach out to your insurance provider with any questions.    If during the course of the call the physician/provider feels a telephone visit is not appropriate, you will not be charged for this service.\"    Patient has given verbal consent to a Telephone visit? Yes    What phone number would you like to be contacted at? **[phone]*    Patient would like to receive their AVS by AVS Preference: Parul.    Laboratories:    Reviewed with Livier.    Recent Results (from the past 168 hour(s))   Comprehensive Metabolic Panel   Result Value Ref Range    Sodium 142 136 - 145 mmol/L    Potassium 4.2 3.5 - 5.0 mmol/L    Chloride 107 98 - 107 mmol/L    CO2 23 22 - 31 mmol/L    Anion Gap, Calculation 12 5 - 18 mmol/L    Glucose 120 70 - 125 mg/dL    BUN 14 8 - 28 mg/dL    Creatinine 0.77 0.60 - 1.10 mg/dL    GFR MDRD Af Amer >60 >60 mL/min/1.73m2    GFR MDRD Non Af Amer >60 >60 mL/min/1.73m2    Bilirubin, Total 0.5 0.0 - 1.0 mg/dL    Calcium 8.7 8.5 - 10.5 mg/dL    Protein, Total 6.4 6.0 - 8.0 g/dL    Albumin 4.0 3.5 - 5.0 g/dL    Alkaline Phosphatase 62 45 - 120 U/L    AST 21 0 - 40 U/L    ALT 15 0 - 45 U/L   HM1 (CBC with Diff) "   Result Value Ref Range    WBC 6.4 4.0 - 11.0 thou/uL    RBC 3.83 3.80 - 5.40 mill/uL    Hemoglobin 14.2 12.0 - 16.0 g/dL    Hematocrit 42.2 35.0 - 47.0 %     (H) 80 - 100 fL    MCH 37.1 (H) 27.0 - 34.0 pg    MCHC 33.6 32.0 - 36.0 g/dL    RDW 12.8 11.0 - 14.5 %    Platelets 222 140 - 440 thou/uL    MPV 9.5 8.5 - 12.5 fL    Neutrophils % 67 50 - 70 %    Lymphocytes % 23 20 - 40 %    Monocytes % 8 2 - 10 %    Eosinophils % 1 0 - 6 %    Basophils % 1 0 - 2 %    Immature Granulocyte % 1 (H) <=0 %    Neutrophils Absolute 4.3 2.0 - 7.7 thou/uL    Lymphocytes Absolute 1.5 0.8 - 4.4 thou/uL    Monocytes Absolute 0.5 0.0 - 0.9 thou/uL    Eosinophils Absolute 0.0 0.0 - 0.4 thou/uL    Basophils Absolute 0.1 0.0 - 0.2 thou/uL    Immature Granulocyte Absolute 0.0 <=0.0 thou/uL     Review of Systems:    Constitutional  Constitutional (WDL): All constitutional elements are within defined limits  Neurosensory  Neurosensory (WDL): Exceptions to WDL  Peripheral Sensory Neuropathy: Moderate symptoms, limiting instrumental ADL(has spinal issues)  Eye   Eye Disorder (WDL): All eye disorder elements are within defined limits  Ear  Ear Disorder (WDL): All ear disorder elements are within defined limits  Cardiovascular  Cardiovascular (WDL): All cardiovascular elements are within defined limits  Pulmonary  Respiratory (WDL): Exceptions to WDL  Dyspnea: Shortness of breath with moderate exertion(sometimes with walking)  Gastrointestinal  Gastrointestinal (WDL): All gastrointestinal elements are within defined limits  Genitourinary  Genitourinary (WDL): All genitourinary elements are within defined limits  Lymphatic  Lymph (WDL): All lymph disorder elements are within defined limits  Musculoskeletal and Connective Tissue  Musculoskeletal and Connetive Tissue Disorders (WDL): Exceptions to WDL  Arthralgia: Moderate pain, limiting instrumental ADL  Bone Pain: Moderate pain, limiting instrumental ADL(low back and left side down her  leg)  Integumentary  Integumentary (WDL): All integumentary elements are within defined limits  Patient Coping  Patient Coping: Accepting;Open/discussion  Distress Assessment  Distress Assessment Score: 2  Accompanied by     Oral Chemo Adherence       History:     1.           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 Serum EPO was low normal.   ? Polycythemia initially appeared secondary, likely to COPD.  ? Phlebotomy was recommended, but she declined.   ? Sleep study was unremarkable.      Hydroxyurea was initiated at 500 mg daily; however, ineffective and therefore increased to 500 mg twice daily.   ? She then cut back on her smoking and self manipulated the dose of Hydrea to take the least amount possible.   ? 2013, August - she quit smoking completely, switching to the E cigarette.  Hydrea was further decreased.  ? 2013, December - she was down to Hydrea 500 mg every other day and she opted to discontinue it.    2014, July - hematocrit elevated at 50.7 and hemoglobin 16.8.  She would only agree to restarting the Hydrea at 500 mg every other day.   ? Four week follow up in mid-August, 2014, found her Hct responding to low dose Hydrea. Wishing to be on the lowest dose possible of Hydrea she wanted to try every 3rd day Hydrea, 500 mg, as she was able to be off Hydrea therapy for approximately 7 months before her hemoglobin and hematocrit lorenzo to the lever of being concerning and therapy resumed.     ? 2014, September follow up found the Hct and HgB on the rise. She agreed to return to every other day dosing with Hydrea, 500 mg.    2015, January 19 - agreed to try phlebotomy and Hydrea was discontinued.   ? Prior phlebotomies:    2020 - 03/09, 01/07.    2019 - 01/19, 03/20, 06/12, 09/10, 10/08, 11/05.    2018 - 02/08, 04/25, 06/26, 08/29, 11/06.    2017 - 01/06, 02/01, 03/08, 04/14, 09/21, 11/02, 12/14.    2016 - 10/28, 10/07, 09/21, 05/24, 02/05, 01/22.    2015 -  10/22, 07/22, 05/29, 05/18, 04/06, 03/09, 01/28.    03/09/20 - With onset of iron deficiency, stopped phlebotomies.  Not anemic.  Resumed Hydrea, 500 mg every other day.   ? 03/16/20 Erythropoietin - WNL @ 11.    If EPO low - BM bx.  If EPO high - likely r/t her COPD.    03/23/20 Jak2 exon12, MPL and ADI testing - no mutations identified.     04/20/2020 - Hydrea increased to 500 mg daily due to increased HcT @ 46.8.     12/22/2020 - Hydrea increased to 500 mg Monday - Saturday with 1000 mg on Sunday due to HcT @ 45.5.      Assessment and Plan:     1.         Polycythemia, JAK2 and BCR-ABL negative on Hydrea therapy.     80 y.o.     05/25/21:    Suma sounds and states that she's been feeling good.  She's tolerating current dosing of Hydrea well and without appreciable side effects.  No signs/symptoms of infection.  Her appetite, weight and performance status are very stable.  She's adhering to covid restrictions and precautions.  She denies pain, cough, fever, chills, unusual headaches, visual or mentation disturbance, bowel or bladder issues, rash.      CMP - WNL.    CBC- basically WNL.  Hematocrit quite stable @ 42.2.      I reviewed Livier's labs with her.  Her hematocrit is very stable, nearly at our goal of < 42 with increasing Hydrea from 500 mg/day to 500 mg/day Monday through Saturday and 1000 mg on Sunday.    Livier is tolerating current Hydrea dosing without incidence.     Continue Hydrea @ 500 mg/day Monday through Saturday and 1000 mg on Sunday - Rx renewed for 3 months.    Continue daily aspirin therapy.  She can only tolerate baby aspirin as RS aspirin causes her stomach to feel poorly.  Instructed to take ASA with a meal.     I reviewed Jesus covid-19 restrictions and precautions and encouraged to follow state and federal recommendations.    Suma refuses the seasonal flu vaccine despite covid19 - stating she has never had the flu.  She is yet considering receiving the SARS-covid  vaccine.    3-month follow up with HEME1 and CMP.  I will schedule as telephone, patient may choose to change to in-person.        Phone call duration: 13:20 - 13:42     FARNAZ Gutierrez, CNP

## 2021-06-17 NOTE — PATIENT INSTRUCTIONS - HE
Patient Instructions by Va Patten MD at 11/12/2019 11:20 AM     Author: Va Patten MD Service: -- Author Type: Physician    Filed: 11/12/2019 12:12 PM Encounter Date: 11/12/2019 Status: Addendum    : Va Patten MD (Physician)    Related Notes: Original Note by Va Patten MD (Physician) filed at 11/12/2019 12:12 PM         Patient Education        Preventing Falls in the Home  As you get older, falls are more likely. Thats because your reaction time slows. Your muscles and joints may also get stiffer, making them less flexible. Illness, medications, and vision changes can also affect your balance. A fall could leave you unable to live on your own. To make your home safer, follow these tips:    Floors    Put nonskid pads under area rugs.    Remove throw rugs.    Replace worn floor coverings.    Tack carpets firmly to each step on carpeted stairs. Put nonskid strips on the edges of uncarpeted stairs.    Keep floors and stairs free of clutter and cords.    Arrange furniture so there are clear pathways.    Clean up any spills right away.    Bathrooms    Install grab bars in the tub or shower.    Apply nonskid strips or put a nonskid rubber mat in the tub or shower.    Sit on a bath chair to bathe.    Use bathmats with nonskid backing.    Lighting    Keep a flashlight in each room.    Put a nightlight along the pathway between the bedroom and the bathroom.    3872-7146 [employer]. [address] [place] [address]. All rights reserved. This information is not intended as a substitute for professional medical care. Always follow your healthcare professional's instructions.         Patient Education   Personalized Prevention Plan  You are due for the preventive services outlined below.  Your care team is available to assist you in scheduling these services.  If you have already completed any of these items, please share that information with your care team to update in your medical  record.  Health Maintenance   Topic Date Due   ? LIPID  12/31/1987   ? ZOSTER VACCINES (1 of 2) 12/31/1992   ? PNEUMOCOCCAL IMMUNIZATION 65+ LOW/MEDIUM RISK (1 of 2 - PCV13) 12/31/2007   ? MEDICARE ANNUAL WELLNESS VISIT  03/04/2017   ? FALL RISK ASSESSMENT  10/12/2018   ? DXA SCAN  10/24/2018   ? INFLUENZA VACCINE RULE BASED (1) 08/01/2019   ? TD 18+ HE  02/04/2020   ? ADVANCE CARE PLANNING  09/20/2021        Patient Education     Understanding Heart Palpitations    Heart palpitations are the feeling you have when your heartbeat seems to be racing, pounding, skipping, or fluttering. Heart palpitations are most often felt in the chest. Sometimes, they may also be felt in the neck.   What causes heart palpitations?  In most cases, heart palpitations are caused by:    Stress or anxiety    Exercise    Pregnancy    Some medicines    Caffeine    Nicotine    Alcohol    Illegal drugs, such as cocaine    Health problems, such as anemia or overactive thyroid  Many heart palpitations are harmless. But in some cases, palpitations may be caused by a problem with the heart such as an abnormal heart rhythm (arrhythmia). They may need to be managed by you and your healthcare provider or treated right away.  How are heart palpitations treated?  Treatments for heart palpitations depend on the cause. Options may include:    Managing the things that trigger your heart palpitations. This could mean:  ? Learning ways to reduce stress and anxiety  ? Staying away from caffeine, nicotine, alcohol, and illegal drugs  ? Stopping the use of certain medicines, under your doctors guidance    Medicines, procedures, or surgery to treat an arrhythmia or other health problem that is causing your symptoms  What are possible complications of heart palpitations?  Complications of heart palpitations are rare unless they are caused by a problem such as an arrhythmia. In such cases, complications can include:    Fainting    Heart failure. This problem  occurs when the heart is so weak it no longer pumps blood well.    Blood clots and stroke    Sudden cardiac arrest. This problem occurs when the heart suddenly stops beating.  When should I call my healthcare provider?  Call your healthcare provider right away if you have any of these:    Palpitations that prevent you from sleeping or otherwise affect your quality of life.    Symptoms that dont get better with treatment, or symptoms that get worse    New symptoms, such as chest pain, shortness of breath, dizziness, or fainting  Date Last Reviewed: 5/1/2016 2000-2019 [employer]. [address] [place] [address]. All rights reserved. This information is not intended as a substitute for professional medical care. Always follow your healthcare professional's instructions.           Patient Education     Understanding Premature Ventricular Contractions (PVCs)  Premature ventricular contractions (PVCs) are a type of abnormal heartbeat (arrhythmia). They are common ly found in people of all ages.    How PVCs happen    Your heart has 4 chambers: 2 upper atria and 2 lower ventricles. Normally, a special group of cells begins the signal to start your heartbeat. These cells are in the sinoatrial (SA) node in the right atrium. The signal quickly travels down your hearts conducting system. It travels to the left and right ventricle. As it travels, the signal triggers nearby parts of your heart to contract. This allows your heart to squeeze in a coordinated way.  During a premature ventricular contraction, the signal to start your heartbeat instead comes from one of the ventricles. This signal is premature, meaning it happens before the SA node has had a chance to fire. The signal spreads through the rest of your heart, causing a heartbeat. If this happens very soon after the previous heartbeat, your heart will push out very little blood. This causes a feeling of a pause between beats. If it happens a  little later, your heart pushes out an almost normal amount of blood. This leads to a feeling of an extra heartbeat. So, the heart has a premature heartbeat in between normal heartbeats.  What causes PVCs?  Certain things can help set off a premature signal in the ventricles. These include:    Advancing age    Reduced blood flow to your heart (such as coronary artery disease)    Scarring after a heart attack    Electrolyte problems, such as low sodium or potassium levels    Increased adrenaline, such as with anxiety    Certain medicines, like digoxin  Many heart conditions raise the risk for PVCs. These include:    Mitral valve prolapse    High blood pressure    Heart attack    Coronary heart disease    Dilated cardiomyopathy    Hypertrophic cardiomyopathy    Congenital heart disease    Heart failure  They often happen in people without any heart disease. However, PVCs are somewhat more common in people with some kind of heart disease.   What are the symptoms of PVCs?  Most people with occasional PVCs dont have symptoms. The more PVCs you have, the more likely you are to feel them. When symptoms do happen, they are usually minor. Symptoms may include:    An awareness of the heart beating    A fluttering or flip-flop feeling in your chest    Feeling of a skipped or extra heartbeat    Dizziness and near-fainting    A pulsing sensation in the neck  PVCs may cause more severe symptoms if you have another heart problem, such as heart failure.   How are PVCs diagnosed?  Your healthcare provider will ask about your medical history and give you a physical exam. An electrocardiogram (ECG) is the main test for diagnosis. This test records the electrical activity of your heart. During an ECG, small pads (electrodes) are placed on your chest, arms, and legs. Wires connect the pads to a machine, which records your hearts electrical signals. This test allows your provider to look at the signal of your heartbeat for a brief time.  Any PVCs that occur during this time will show up on the ECG. In some cases, your healthcare provider might advise ECG monitoring over a day or more, up to 30 days. This can help to catch PVCs that dont happen often. This is done with a monitor you wear night and day for the test period. Heart monitor. There are 2 types of external heart monitors:    Holter monitor. This monitor can be worn for 1 to 7 days. It provides a constant recording of heart activity. After the test is done, your health care provider analyzes the recording.    Event monitors. These monitors can be used for 3 to 4 weeks. One kind is a memory loop recorder. This monitor records constantly, but stores the recording only when you press a button. The other kind is a credit card-sized recorder. This monitor is turned on only during an episode. With both types, you send the recordings of symptoms to your health care provider over the phone.  These may be the only tests your healthcare provider will need. You may need more testing if you have PVCs often, or many in a row. Your provider may look at other causes, including possible heart problems. These tests might include:    Echocardiography. This test looks at your hearts structure and function.    Cardiac stress testing. This test checks how your heart responds to exercise and to evaluate heart artery blood flow.    Cardiac CT or MRI     Blood tests. This is done to check potassium and thyroid levels.  Date Last Reviewed: 11/1/2017 2000-2019 [employer]. [address] [place] [address], [place] [address]. All rights reserved. This information is not intended as a substitute for professional medical care. Always follow your healthcare professional's instructions.

## 2021-06-17 NOTE — PROGRESS NOTES
Pt admitted per pedis for her regularly scheduled phlebotomy. States she has been feeling fine other than her left deltoid due to falling on ice and landing on left arm. States she had xrays and saw her pmd with neg results. No abnormal findings. 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

## 2021-06-17 NOTE — TELEPHONE ENCOUNTER
Telephone Encounter by Janice Walter RN at 5/18/2020  9:00 AM     Author: Janice Walter RN Service: -- Author Type: Registered Nurse    Filed: 5/18/2020  9:02 AM Encounter Date: 5/16/2020 Status: Signed    : Janice Walter RN (Registered Nurse)       Cancer Care Refill Protocol Passed     Rerun Protocol  (5/16/2020 11:36 AM)       Cancer Care visit in the last 12 months      Last office visit: 3/9/2020 Luke Guerrero CNP Next office visit within 3 mo: 5/28/2020 Luke Guerrero CNP  Last MTM visit: Visit date not found    Prescriber or current provider: Luke Guerrero CNP  Last diagnosis associated with med order:   1. Polycythemia vera (H)  - hydroxyurea (HYDREA) 500 mg capsule [Pharmacy Med Name: HYDROXYUREA 500 MG CAPSULE]; TAKE 1 CAPSULE BY MOUTH ONCE DAILY, TAKEN WITH A LARGE GLASS OF WATER.  Dispense: 30 capsule; Refill: 1

## 2021-06-17 NOTE — PATIENT INSTRUCTIONS - HE
Patient Instructions by Lise Francisco CNP at 1/16/2020  1:20 PM     Author: Lise Francisco CNP Service: -- Author Type: Nurse Practitioner    Filed: 1/16/2020  1:56 PM Encounter Date: 1/16/2020 Status: Signed    : Lise Francisco CNP (Nurse Practitioner)       ~Please call Nurse Navigation line [phone] with any questions or concerns about your treatment plan, if symptoms worsen and you would like to be seen urgently, or if you have problems controlling bladder and bowel function.  ~Follow Up Appointment time slots with Lise Francisco CNP with [facility], are also available at the [facility] location near [facility] on the first and third THURSDAY afternoons of each month.    Discussed the importance of core strengthening, ROM, stretching exercises with the patient and how each of these entities is important in decreasing pain.  Explained to the patient that the purpose of physical therapy is to teach the patient a home exercise program.  These exercises need to be performed every day in order to decrease pain and prevent future occurrences of pain.        You have been told your have a problem with your vertebrae. These are the bones that stack together to make up the spine. Spondylolysis is a defect (crack) in the back part of a vertebra. Spondylolisthesis is the slipping forward of a vertebra. Spondylolisthesis is often due to spondylolysis. But you can have one without the other.           How are Spondylolysis and Spondylolisthesis Treated?  These problems cant be cured, but often stop causing problems in time. In the meantime, your symptoms can be treated.    Medications: To help reduce back pain and swelling, medications may be prescribed. These are usually NSAIDs (nonsteroidal anti-inflammatory drugs). These medications can include ibuprofen and naproxen. They may be over-the-counter or prescription. Your health care provider will tell you what types  and dosage are best for your child.     Physical therapy: Stretching and strengthening the muscles around the spine and in the legs can help relieve symptoms due to these conditions. Your doctor may refer you to a physical therapist (PT) for a course of physical therapy and exercises.    Surgery: If spondylolisthesis is severe or cant be treated with nonsurgical means, surgery may be done. During surgery, the slipping vertebra is fused to the vertebra below it to prevent further movement.

## 2021-06-17 NOTE — PROGRESS NOTES
Assessment/Plan:        Diagnoses and all orders for this visit:    Arm pain  -     MR Humerus Without Contrast Left; Future; Expected date: 4/30/18  -     MR Humerus Without Contrast Left        Concerns with ongoing symptoms.  We will do an MRI of her left arm.  If negative, consider physical therapy.  She will be out of town next week and will be out for 2 weeks.  Will be available on her cell phone [phone].  Will inform her once results available.  She was agreeable with the plans.  Subjective:    Patient ID: Livier Bowers is a 77 y.o. female.    HPI    Livier is here with concerns about pain in her left arm.  She fell on ice in March 2018.  Fell with her left arm near her body and landed on her left side.  She denies any swelling.  Had some pain but thought that it will get better with time.  She was seen a month ago and x-ray was done which came back negative for fractures.  Continues to bother her and now it wakes her up from sleep if she turns around.  She notes pain when she tries to reach across, move her left upper extremity.  She is right-hand dominant.  Pain at 7-8/10.  Occasional weakness.  No numbness.  Would sometimes have pain in her left lateral neck but mostly in the left arm.  No history of surgery in her left upper extremity before.  Has not taken any medications for the pain.  Able to do stuff but feels a bit limited with lifting.    Review of Systems  As above otherwise negative.          Objective:    Physical Exam  /80 (Patient Site: Right Arm, Patient Position: Sitting, Cuff Size: Adult Regular)  Pulse 95  Wt 126 lb 1.6 oz (57.2 kg)  SpO2 99%  Breastfeeding? No  BMI 22.34 kg/m2    Vital signs noted above. AAO ×3.  HEENT no nasal discharge, moist oral mucosa. Neck: Supple neck, nonpalpable cervical lymph nodes.  Lungs: Clear to auscultation bilateral.  Heart: S1-S2 regular rate and rhythm, no murmurs were noted.  Abdomen:  with bowel sounds.  Extremities: No edema, pulses  were full and equal.  Pain on her left arm, mid region.  No swelling, erythema.  Pain with abduction and extension of her elbow.  No limitation in range of motion.

## 2021-06-17 NOTE — PROGRESS NOTES
ASSESSMENT/PLAN:  Left Arm pain following injury that occurred one month ago  Normal exam and no limitation in range of motion  Xray was negative for fracture  Recommend that she continues with supportive cares  F/u with PCP in 4 weeks if not better  -     XR Humerus Left 2 VWS; Future; Expected date: 4/3/18    Screen for colon cancer  -     Ambulatory referral for Colonoscopy      Orders Placed This Encounter   Procedures     XR Humerus Left 2 VWS     Standing Status:   Future     Number of Occurrences:   1     Standing Expiration Date:   4/3/2019     Order Specific Question:   Can the procedure be changed per Radiologist protocol?     Answer:   Yes     Ambulatory referral for Colonoscopy     Referral Priority:   Routine     Referral Type:   Colonoscopy     Requested Specialty:   Gastroenterology     Number of Visits Requested:   1           CHIEF COMPLAINT:  Chief Complaint   Patient presents with     Pain     left arm x 1 month. Pt fell about 1 month ago and hurt her arm       HISTORY OF PRESENT ILLNESS:  Livier is a 77 y.o. female presenting to the clinic today for arm pain. One month ago, she was shoveling, and she slipped and fell on her left side. Since then, she has had continued soreness on the upper left arm. She has more discomfort and pain with use of the arm. She did not initially notice any swelling or bruising of the left arm. The pain has not improved since her fall one month ago. She has most pain with hugging her right shoulder with the left arm, or reaching to put her seatbelt on. Her left elbow and wrist are at baseline. She is former smoker, and quit at least 2 years ago. She does have history of osteoporosis, and she is taking a vitamin D and calcium.       REVIEW OF SYSTEMS:   Constitutional: Negative.   HENT: Negative.   Eyes: Negative.   Respiratory: Negative.   Cardiovascular: Negative.   Gastrointestinal: Negative.   Endocrine: Negative.   Genitourinary: Negative.   Skin: Negative.    Allergic/Immunologic: Negative.   Neurological: Negative.   Hematological: Negative.   Psychiatric/Behavioral: Negative.   All other systems are negative.    PFSH:  No new history.     TOBACCO USE:  History   Smoking Status     Former Smoker     Quit date: 2/1/2014   Smokeless Tobacco     Current User     Comment: E-cigs  10/day       VITALS:  Vitals:    04/03/18 1135   BP: 118/86   Pulse: 68   Weight: 127 lb 3 oz (57.7 kg)     Wt Readings from Last 3 Encounters:   04/03/18 127 lb 3 oz (57.7 kg)   12/14/17 127 lb 9.6 oz (57.9 kg)   10/12/17 127 lb 1.6 oz (57.7 kg)       PHYSICAL EXAM:  Constitutional: Patient is oriented to person, place, and time. Patient appears well-developed and well-nourished. No distress.   Neurological: Patient is alert and oriented to person, place, and time.   Skin: Skin is warm and dry. No rash noted. Patient is not diaphoretic. No erythema. No pallor.  Musculoskeletal: No bruising, swelling or deformity. Bicep curls intact. Tricep extension intact. Good forward flexion. Internal and external rotation of the left shoulder intact. No tenderness to palpation of AC joint, glenohumeral joint. No tenderness to palpation to the bicep and tricep muscles. Left clavicle is intact.     Results for orders placed or performed in visit on 02/08/18   Hemoglobin   Result Value Ref Range    Hemoglobin 14.7 12.0 - 16.0 g/dL   Hematocrit   Result Value Ref Range    Hematocrit 45.2 35.0 - 47.0 %     Xr Humerus Left 2 Vws    Result Date: 4/3/2018  XR HUMERUS LEFT 2 VWS 4/3/2018 12:11 PM  INDICATION: Unspecified injury of shoulder and upper arm, unspecified arm, initial encounter. COMPARISON: None. FINDINGS: No evidence for fracture of the humerus. No focal bony abnormalities. On limited evaluation, the shoulder and elbow joints are unremarkable. CONCLUSIONS: Negative.        ADDITIONAL HISTORY SUMMARIZED (2): None.  DECISION TO OBTAIN EXTRA INFORMATION (1): None.   RADIOLOGY TESTS (1): Ordered humerus  XR.  LABS (1): None.  MEDICINE TESTS (1): None.  INDEPENDENT REVIEW (2 each): Personally reviewed XR.     I, Sandrita Williamson, am scribing for and in the presence of, Dr. Abbott.    I, Armin Mendez MD , personally performed the services described in this documentation, as scribed by Sandrita Williamson in my presence, and it is both accurate and complete.    MEDICATIONS:  Current Outpatient Prescriptions   Medication Sig Dispense Refill     CALCIUM CARBONATE/VITAMIN D3 (CALCIUM 500 WITH D ORAL) Take 2 tablets by mouth daily. Gummies w/700 IU Vitamin D in each       cholecalciferol, vitamin D3, 2,000 unit Tab Take 1 tablet by mouth daily.       LORazepam (ATIVAN) 0.5 MG tablet Take 1 tablet (0.5 mg total) by mouth every 8 (eight) hours as needed for anxiety. 30 tablet 0     No current facility-administered medications for this visit.        Total data points: 3

## 2021-06-18 NOTE — PROGRESS NOTES
[facility] Hematology and Oncology Progress Note    Patient: Livier Bowers  MRN: [id number]  Date of Service: 6/26/2018         Reason for Visit:    Follow up polycythemia (appears secondary).    Assessment and Plan:    1)  Polycythemia, JAK2 and BCR-ABL negative:     77 y.o.     Appears secondary, likely COPD.     2010 - hemoglobin 18 and hematocrit of 53. Serum EPO was low normal. Phlebotomy was recommended, but she declined. Sleep study was unremarkable.  Hydroxyurea was initiated at 500 mg daily; however, ineffective and therefore increased to 500 mg twice daily. She then cut back on her smoking and self manipulated the dose of Hydrea to take the least amount possible.     2013, August - she quit smoking completely, switching to the E cigarette.  Hydrea was further decreased.    2013, December - she was down to Hydrea 500 mg every other day and she opted to discontinue it.    2014, July - hematocrit elevated at 50.7 and hemoglobin 16.8. She would only agree to restarting the Hydrea at 500 mg every other day. Four week follow up in mid-August, 2014, found her Hct responding to low dose Hydrea. Wishing to be on the lowest dose possible of Hydrea she wanted to try every 3rd day Hydrea, 500 mg, as she was able to be off Hydrea therapy for approximately 7 months before her hemoglobin and hematocrit lorenzo to the lever of being concerning and therapy resumed.   2014, September follow up found the Hct and HgB on the rise. She agreed to return to every other day dosing with Hydrea, 500 mg.    2015, January 19 - agreed to try phlebotomy and Hydrea was discontinued.     Prior phlebotomies:    2018 - 02/08, 04/25 2017 - 01/06, 02/01, 03/08, 04/14, 09/21, 11/02, 12/14.    2016 - 10/28, 10/07, 09/21, 05/24, 02/05, 01/22.    2015 - 10/22, 07/22, 05/29, 05/18, 04/06, 03/09, 01/28.    06/26/18:    Hemoglobin 14.8, hematocrit 45.7.      The patient feels good.  She tolerates phlebotomies well, but they make her very nervous.   She has now stopped E cigarettes as well.     She's been requiring about 6 phlebotomies/year.  She was turned down as a blood donor due to her diagnosis.  She controls her Hct very well when doing a phlebotomy every couple months.          She has a Rx for 0.5 mg Ativan to try and see if that would help calm her phlebotomy - associated anxieties.  She used it once but afraid she will need a  so chooses not to use.    Our goal is to keep Hct at about 42 or less.      Continue daily aspirin therapy.  She can only tolerate baby aspirin as RS aspirin causes her stomach to feel poorly.  Instructed to take ASA with a meal.    Will plan for a 500 ml phlebotomy today, and every 2 months with follow up appointment in 6 months.    Hct and HgB with each phlebotomy.     12/26/18 - follow up with labs and potential phlebotomy.     ECOG Performance:     ECOG Performance Status: 0    Distress Assessment:             TT > 20 minutes face to face with > 50% counseling and care coordination.    Luke Guerrero, CNP     CC: Va Patten MD  ____________________________________________    Interim History:    The patient presents today feeling and looking well.  Her last phlebotomy was about 8 weeks ago.  She tolerates phlebotomies without incidence but becomes quite anxious before having them because she doesn't like needles. Her appetite, weight and performance status remain quite stable.  She denies nausea, vomiting, cough, fever, chills, headache, visual or mentation disturbance, bowel or bladder issues.  She has now stopped E cigarettes as well.  She had 7 - 500 ml phlebotomies in 2015, 6 in 2016 and 7 in 2017.    Pain Status:    Currently in Pain: Yes    Review of Systems:    Constitutional  Constitutional (WDL): Exceptions to WDL  Fatigue: Fatigue relieved by rest  Neurosensory  Neurosensory (WDL): Exceptions to WDL  Peripheral Motor Neuropathy: Asymptomatic, clinical or diagnostic observations only, intervention not  "indicated (left shoulder)  Cardiovascular  Cardiovascular (WDL): All cardiovascular elements are within defined limits  Pulmonary  Respiratory (WDL): Within Defined Limits  Gastrointestinal  Gastrointestinal (WDL): All gastrointestinal elements are within defined limits  Genitourinary  Genitourinary (WDL): All genitourinary elements are within defined limits  Integumentary  Integumentary (WDL): All integumentary elements are within defined limits  Patient Coping  Patient Coping: Accepting  Distress Assessment     Accompanied by  Accompanied by: Alone    Past Histories:    Past Medical History:   Diagnosis Date     Osteoporosis      Parathyroid adenoma      Polycythemia vera (H)      Vitamin D deficiency          Past Surgical History:   Procedure Laterality Date     CATARACT EXTRACTION Left      DILATION AND CURETTAGE OF UTERUS       KNEE SURGERY      torn meniscus     THYROIDECTOMY, PARTIAL N/A 12/2/2014    Procedure: RIGHT PARATHYROID EXCISION ;  Surgeon: Tressa Louis MD;  Location: [facility];  Service:        Physical Exam:    Recent Vitals 6/26/2018   Height 5' 3.66\"   Weight 126 lbs 2 oz   BSA (m2) 1.6 m2   /78   Pulse 65   Temp 98.2   Temp src 1   SpO2 95   Some recent data might be hidden     General:  Alert and oriented.  Very pleasant.  Anxious and didn't sleep weel last night due to knowing this was a phlebotomy day.     HEENT:  Anicteric sclera. EOMI. OJ. No oral lesions.  Extremities:  No edema  Skin:    No rash noted  Chest:  Lungs clear.  CVS:  S1S2.  RRR.  No murmur heard  Abdomen: No organomegaly.  Lymph nodes: No palpable adenopathy neck, axilla or groin.    Lab Results:    Reviewed with patient.    Recent Results (from the past 24 hour(s))   Hemoglobin   Result Value Ref Range    Hemoglobin 14.8 12.0 - 16.0 g/dL   Hematocrit   Result Value Ref Range    Hematocrit 45.7 35.0 - 47.0 %       Imaging:    No results found.    "

## 2021-06-18 NOTE — PROGRESS NOTES
Livier is here today for 6 month follow up with labs, OV with Luke Guerrero, and possible phlebotomy for ongoing management and tx of polycythemia vera. Christina Maharaj

## 2021-06-22 NOTE — PATIENT INSTRUCTIONS - HE
Recent Results (from the past 24 hour(s))   Hemoglobin   Result Value Ref Range    Hemoglobin 14.9 12.0 - 16.0 g/dL   Hematocrit   Result Value Ref Range    Hematocrit 46.8 35.0 - 47.0 %

## 2021-06-22 NOTE — PROGRESS NOTES
[facility] Hematology and Oncology Progress Note    Patient: Livier Bowers  MRN: [id number]  Date of Service: 1/9/2019         Reason for Visit:    Follow up polycythemia (appears secondary).    Assessment and Plan:    1)  Polycythemia, JAK2 and BCR-ABL negative:     78 y.o.     Appears secondary, likely COPD.     2010 - hemoglobin 18 and hematocrit of 53. Serum EPO was low normal. Phlebotomy was recommended, but she declined. Sleep study was unremarkable.  Hydroxyurea was initiated at 500 mg daily; however, ineffective and therefore increased to 500 mg twice daily. She then cut back on her smoking and self manipulated the dose of Hydrea to take the least amount possible.     2013, August - she quit smoking completely, switching to the E cigarette.  Hydrea was further decreased.    2013, December - she was down to Hydrea 500 mg every other day and she opted to discontinue it.    2014, July - hematocrit elevated at 50.7 and hemoglobin 16.8. She would only agree to restarting the Hydrea at 500 mg every other day. Four week follow up in mid-August, 2014, found her Hct responding to low dose Hydrea. Wishing to be on the lowest dose possible of Hydrea she wanted to try every 3rd day Hydrea, 500 mg, as she was able to be off Hydrea therapy for approximately 7 months before her hemoglobin and hematocrit lorenzo to the lever of being concerning and therapy resumed.   2014, September follow up found the Hct and HgB on the rise. She agreed to return to every other day dosing with Hydrea, 500 mg.    2015, January 19 - agreed to try phlebotomy and Hydrea was discontinued.     Prior phlebotomies:    2018 - 02/08, 04/25, 06/26, 08/29, 11/06 2017 - 01/06, 02/01, 03/08, 04/14, 09/21, 11/02, 12/14.    2016 - 10/28, 10/07, 09/21, 05/24, 02/05, 01/22.    2015 - 10/22, 07/22, 05/29, 05/18, 04/06, 03/09, 01/28.    01/09/19:    Hemoglobin 14.9, hematocrit 46.8.      The patient looks and feels good.  She tolerates phlebotomies well, but  they make her very nervous and she hates the needles.       She's been requiring about 6 phlebotomies/year.  She was turned down as a blood donor due to her diagnosis.  She controls her Hct very well when doing a phlebotomy every 2 months.  She will only agree to every 10 week phlebotomies @ this time.          She has a Rx for 0.5 mg Ativan to try and see if that would help calm her phlebotomy - associated anxieties.  She used it once but afraid she will need a  so chooses not to use.    Our goal is to keep Hct at about 42 or less.      Continue daily aspirin therapy.  She can only tolerate baby aspirin as RS aspirin causes her stomach to feel poorly.  Instructed to take ASA with a meal.    500 ml phlebotomy today, and every 10 weeks.    Hct and HgB with each phlebotomy.     05/29/19 - follow up with every 10 week phlebotomies, HgB and Hct.     ECOG Performance:     ECOG Performance Status: 0    Distress Assessment:    Distress Assessment Score: 2        TT > 20 minutes face to face with > 50% counseling and care coordination.    Luke Guerrero, CNP     CC: Va Patten MD  ____________________________________________    Interim History:    The patient presents today feeling and looking well.  Her last phlebotomy was about 8 weeks ago.  She tolerates phlebotomies without incidence but becomes quite anxious before having them because she doesn't like needles. Her appetite, weight and performance status remain quite stable.  She denies nausea, vomiting, cough, fever, chills, headache, visual or mentation disturbance, bowel or bladder issues.  She has now stopped both cigarettes and E cigarettes as well.  She had 7 - 500 ml phlebotomies in 2015, 6 in 2016, 7 in 2017 and 5 in 2018.    Pain Status:    Currently in Pain: Yes    Review of Systems:    Constitutional  Constitutional (WDL): All constitutional elements are within defined limits  Neurosensory  Neurosensory (WDL): All neurosensory elements are within  defined limits  Cardiovascular  Cardiovascular (WDL): All cardiovascular elements are within defined limits  Pulmonary  Respiratory (WDL): Exceptions to WDL  Dyspnea: Shortness of breath with moderate exertion(with stairs)  Gastrointestinal  Gastrointestinal (WDL): All gastrointestinal elements are within defined limits  Genitourinary  Genitourinary (WDL): All genitourinary elements are within defined limits  Integumentary  Integumentary (WDL): All integumentary elements are within defined limits  Patient Coping  Patient Coping: Accepting;Open/discussion  Distress Assessment  Distress Assessment Score: 2  Accompanied by  Accompanied by: Alone    Past Histories:    Past Medical History:   Diagnosis Date     Osteoporosis      Parathyroid adenoma      Polycythemia vera (H)      Vitamin D deficiency          Past Surgical History:   Procedure Laterality Date     CATARACT EXTRACTION Left      DILATION AND CURETTAGE OF UTERUS       KNEE SURGERY      torn meniscus     THYROIDECTOMY, PARTIAL N/A 12/2/2014    Procedure: RIGHT PARATHYROID EXCISION ;  Surgeon: Tressa Louis MD;  Location: [facility];  Service:        Physical Exam:    Recent Vitals 1/9/2019   Height -   Weight 125 lbs 6 oz   BSA (m2) 1.6 m2   /75   Pulse 92   Temp 98.1   Temp src 1   SpO2 96   Some recent data might be hidden     General:  Alert and oriented.  Very pleasant.  Anxious knowing this was a phlebotomy day.     HEENT:  Anicteric sclera. EOMI. OJ. No oral lesions.  Extremities:  No edema  Skin:    No rash noted  Chest:  Lungs clear.  CVS:  S1S2.  RRR.  No murmur heard  Lymph nodes: No palpable adenopathy neck, axilla or groin.    Lab Results:    Reviewed with patient.    Recent Results (from the past 24 hour(s))   Hemoglobin   Result Value Ref Range    Hemoglobin 14.9 12.0 - 16.0 g/dL   Hematocrit   Result Value Ref Range    Hematocrit 46.8 35.0 - 47.0 %       Imaging:    No results found.

## 2021-06-24 ENCOUNTER — AMBULATORY - HEALTHEAST (OUTPATIENT)
Dept: PHARMACY | Facility: HOSPITAL | Age: 81
End: 2021-06-24

## 2021-06-26 NOTE — PROGRESS NOTES
Progress Notes by Va Patten MD at 5/29/2018 12:30 PM     Author: Va Patten MD Service: -- Author Type: Physician    Filed: 5/29/2018  1:43 PM Encounter Date: 5/29/2018 Status: Signed    : Va Patten MD (Physician)       Assessment/plan   Livier Bowers is a 77 y.o. female who is New to my practice but  establish patient to  The clinic   Chief Complaint   Patient presents with   ? Establish Care        Livier was seen today for establish care.    Diagnoses and all orders for this visit:    Establishing care with new doctor, encounter for    Polycythemia vera  Comments:  get phlebotomies     Postmenopausal Osteoporosis  -     ibandronate (BONIVA) 150 mg tablet; Take 1 tablet (150 mg total) by mouth every 30 (thirty) days. Take in AM with glass of water prior to food, don't lie down for 30 minutes.    Pain of left upper arm  Comments:  on exam bicipital tendonitis , kinesio tapping done         Subjective:      HPI: Livier Bowers is a 77 y.o. female is here to establish care , very healthy 77 yr old , only concern currently continue left arm pain since she fell on ice 3 month ago . Patient had MRI done ordered by Dr Haines result still pending . External rotation painful no pain overhead extension , rate pain 4/10 no swelling , pain at night if sleep on her left side.     plycythemia vera : follow oncologist . phlebotimy every 3 month , stopped smoking 2 yr ago still vap    Osteoporosis : currently only taking vit d and calcium should be on bisphosphonate , tried fosamax only for 2 month didn't like the side effects so stopped . Will do trial of Boniva again     Lab Results   Component Value Date    HGB 15.4 04/25/2018           Past Medical History:   Diagnosis Date   ? Osteoporosis    ? Parathyroid adenoma    ? Polycythemia vera    ? Vitamin D deficiency      Past Surgical History:   Procedure Laterality Date   ? CATARACT EXTRACTION Left    ? DILATION AND CURETTAGE OF UTERUS     ? KNEE SURGERY       "torn meniscus   ? THYROIDECTOMY, PARTIAL N/A 12/2/2014    Procedure: RIGHT PARATHYROID EXCISION ;  Surgeon: Tressa Louis MD;  Location: [facility];  Service:      Review of patient's allergies indicates no known allergies.  Current Outpatient Prescriptions   Medication Sig Dispense Refill   ? CALCIUM CARBONATE/VITAMIN D3 (CALCIUM 500 WITH D ORAL) Take 2 tablets by mouth daily. Gummies w/700 IU Vitamin D in each     ? cholecalciferol, vitamin D3, 2,000 unit Tab Take 1 tablet by mouth daily.     ? ibandronate (BONIVA) 150 mg tablet Take 1 tablet (150 mg total) by mouth every 30 (thirty) days. Take in AM with glass of water prior to food, don't lie down for 30 minutes. 1 tablet 11   ? LORazepam (ATIVAN) 0.5 MG tablet Take 1 tablet (0.5 mg total) by mouth every 8 (eight) hours as needed for anxiety. 30 tablet 0     No current facility-administered medications for this visit.      Family History   Problem Relation Age of Onset   ? Cirrhosis Mother    ? Lung disease Father      Mesothelioma   ? Heart disease Maternal Grandfather    ? Colon cancer Paternal Grandmother    ? Heart attack Paternal Grandfather        Patient Active Problem List   Diagnosis   ? Polycythemia vera   ? Joint Pain, Localized In The Hip   ? Pain In The Arms   ? Vitamin D Deficiency   ? Hypercalcemia   ? Adenoma Of The Parathyroid Gland   ? Hyperparathyroidism   ? Osteoporosis   ? Postmenopausal Osteoporosis       Review of Systems   12 point comprehensive review of systems was negative except as noted and HPI     Social History     Social History Narrative       Objective:     Vitals:    05/29/18 1252   BP: 132/72   Pulse: 82   Weight: 124 lb 3.2 oz (56.3 kg)   Height: 5' 3.66\" (1.617 m)       Physical Exam:     General: Alert, no acute distress.   HEENT: normocephalic conjunctivae are clear, Normal pearly TMs bilaterally without erythema, pus or fluid. Position and landmarks are normal.  Nose is clear.  Oropharynx is moist " and clear, without tonsillar hypertrophy, asymmetry, exudate or lesions.  Neck: supple without adenopathy or thyromegaly.  Lungs: Good aeration bilaterally. No prolongation of expiratory phase.   No tachypnea, retractions, or increased work of breathing. Clear to auscultation without wheezes, rales or rhonci.    Heart: regular rate and rhythm, normal S1 and S2, no murmurs  Abdomen: soft and nontender, bowel sounds are present, no hepatosplenomegaly or mass palpable.  Shoulder /arm + tenderness at bicipital groove range of motion intact slight pain on external rotation   Skin: clear without rash or lesions  Neuro: alert, interactive moving all extremities equally, normal muscle tone in all 4 extremities, deep tendon reflexes 2+ symmetrically at the patella      Va Patten MD    Patient Instructions         Dear Livier    It was a pleasure to see you in clinic today. Should you have any questions or concerns, my assistant is Ariana / and care coordinator Patti and they  can be reached directly at [phone]    Plan discussed at this visit : I will wait for MRI report to give any further recommendation, we did the sports tapping to support the shoulder if it work can be done for long term basis   Try local icing and pain balm as needed   Might refer you to physical therapy  To strengthen the shoulder muscle     TO prevent further bone loss started you on once monthly Boniva     Follow up 6 month or early       For lab work, our office will contact you with those results in your preferred method of communication either Mychart , letter or call.    Feel free to call for any concerns or questions or send us My chart message     Va Patten MD       Understanding Shoulder Impingement Syndrome    Shoulder impingement syndrome is a problem with the shoulder joint. It occurs when certain parts within the joint swell and are pinched. This can cause nagging pain and problems with moving the arm.  What causes shoulder  impingement syndrome?  It is possible to develop impingement after years of normal shoulder use. But in most cases the condition occurs because of repeated overhead movements. These include such things as stocking shelves, painting, swimming, and throwing. These movements can irritate parts of the shoulder, leading to swelling. Swollen parts of the shoulder take up more room, making the joint space smaller. Some parts that may be involved include:    A sac of fluid (bursa) that cushions the shoulder joint.  When the bursa is irritated, it may lead to a condition called bursitis. This is when the bursa swells with fluid, filling and squeezing the joint space.    Fibrous tissues (tendons) that connect muscle to bone. When tendons are irritated, they may become swollen. This is called tendonitis.    The end (acromion) of the shoulder blade. This bone may be flat or hooked. If the acromion is hooked, the joint space may be smaller than normal. Growths (bone spurs) on the acromion can also narrow the joint space. Acromion problems dont often cause impingement. But they can make it worse.  Symptoms of shoulder impingement syndrome  Symptoms include pain, pinching, or stiffness in your shoulder. Pain often comes with movement, particularly reaching overhead or backward. It may also be felt when the shoulder is at rest. Pain at night during sleep is common.  Treatment for shoulder impingement syndrome  Treatment will depend on the cause of the problem, how bad the problem is, and if other parts of the shoulder are damaged. Treatment may include the following:    Active rest. This allows the shoulder to heal. It means using the arm and shoulder, but avoiding activities that cause pain. These likely include reaching overhead or sleeping on your shoulder.    Cold packs. These help reduce swelling and relieve pain.    Prescription or over-the-counter pain medicines. These help relieve pain and swelling.    Arm and shoulder  exercises. These help keep your shoulder joint mobile as it heals. They also help improve muscle strength around the joint.    Shots of medicine into the joint. This can help reduce swelling and pain for a short time.  If other measures dont work to relieve symptoms, you may need surgery. This opens up space in the joint to allow pain-free motion.  Possible complications of shoulder impingement syndrome  It might be tempting to stop using your shoulder completely to avoid pain. But doing so may lead to a condition called frozen shoulder. To help prevent this, follow instructions you are given for active rest and for doing exercises to help your shoulder heal.  When to call your healthcare provider  Call your healthcare provider right away if you have any of these:    Fever of 100.4 F (38 C) or higher, or as directed    Symptoms that dont get better, or get worse    New symptoms   Date Last Reviewed: 3/10/2016    8127-0242 [employer]. [address] [place] [address]. All rights reserved. This information is not intended as a substitute for professional medical care. Always follow your healthcare professional's instructions.        Osteoporosis: Understanding Bone Loss     A balanced system keeps building and resorbing bone.   The body has a natural system for maintaining bone. Understanding this system can help you learn how to maintain your bones.  A balanced system supports the body  The body is always losing (resorbing) and making bone. This process is called remodeling. Bone-resorbing cells take bone apart. They do this so the minerals can be used to repair an injury or make new bone. Bone-making cells form new bone using calcium and other minerals. These minerals come from the food you eat. When this bone-making system is in balance, the same amount of bone is built and resorbed.        An unbalanced system cant give support     An unblalanced system builds too little bone and resorbs  too much.   Changes in hormone levels, activity, medications, or diet can affect the bone-making system. When the system gets out of balance, the amount of bone lost is greater than the amount of bone made. This can cause osteopenia (when bone starts to become less dense). Left untreated, bone loss gets worse, leading to osteoporosis. Weak bones cant support the body. In fact, they can fracture just from the weight of your body. This often happens in vertebrae (bones of the spine). When vertebrae fracture, parts of the spine compress. This causes the back to bend or hump over, and it can also cause back pain.  Date Last Reviewed: 10/11/2015    8111-0389 [employer]. [address] [place] [address], [place] [address]. All rights reserved. This information is not intended as a substitute for professional medical care. Always follow your healthcare professional's instructions.        Living with Osteoporosis: Preventing Fractures  If you have osteoporosis, you can do a lot to reduce its effect on your life. Knowing how to prevent fractures and spinal curvature can help you live more comfortably and safely with this disease.    Reducing your risk for fractures  The most common fracture sites in people with osteoporosis are the wrist, spine, and hip. These fractures are often caused by accidents and falls. All fractures are painful and may limit what you can do. But hip fractures are very serious. They need surgery, and it can take months to recover. To reduce your risk for fractures:    Get regular exercise. Try walking, swimming, or weight training.    Eat foods that are rich in calcium, or take calcium supplements.    Make your home safe to help avoid accidents.    Take extra precautions not to fall in risky areas, such as icy sidewalks.  Understanding spinal fractures  Your spine is made up of many bones called vertebrae. Osteoporosis can cause the vertebrae in your spine to collapse. As a result, your upper  back may arch forward, creating a curvature. Spine fractures may also result from back strain and bad posture. You will also lose height. Your lower spine must then adjust to keep your body balanced. This can cause back pain. To prevent or lessen these spinal changes:    Practice good posture.    Use proper techniques if you need to lift heavy objects.    Do back exercises to help your posture.    Lie on your back when you have pain.    Ask your healthcare provider about these and other ways to help your spine.  Date Last Reviewed: 10/17/2015    0701-0326 [employer]. [address] [place] [address]. All rights reserved. This information is not intended as a substitute for professional medical care. Always follow your healthcare professional's instructions.        Osteoporosis Medications: Bisphosphonates  Depending on your needs, your healthcare provider may prescribe medicines to prevent or treat osteoporosis.    Bisphosphonates  Several medicines make up the class of drugs known as bisphosphonates. Bisphosphonates are the most common type of medicine used to help prevent and treat bone loss. Bisphosphonates are given in pill or injectable form as an IV infusion. They must be taken exactly as directed. Benefits may include:    Reducing bone loss    Increasing bone density in the hip and spine    Reducing risk of fractures in the spine, hip, and wrist  Side effects may include:    Heartburn    Nausea    Abdominal pain    Bone or muscle pain  Taking bisphosphonates pills  Always read medicine information closely. Certain bisphosphonates must be taken:    On an empty stomach.    With a full glass of water (8 oz) first thing in the morning.    At least 30 minutes to one hour before any food, drink, or other medications.    While sitting or standing. You should not lie down for at least 30 minutes after taking the medicine.  Newer medicines can be taken weekly or monthly. Talk with your doctor  to find out which one is right for you.   Date Last Reviewed: 10/11/2015    8180-2315 [employer]. [address] [facility], [place] [address]. All rights reserved. This information is not intended as a substitute for professional medical care. Always follow your healthcare professional's instructions.

## 2021-06-27 ENCOUNTER — HEALTH MAINTENANCE LETTER (OUTPATIENT)
Age: 81
End: 2021-06-27

## 2021-06-28 NOTE — PROGRESS NOTES
Progress Notes by Armando Ortiz, PT at 1/29/2020  2:15 PM     Author: Armando Ortiz PT Service: -- Author Type: Physical Therapist    Filed: 2/3/2020  5:52 AM Encounter Date: 1/29/2020 Status: Attested    : Armando Ortiz PT (Physical Therapist) Cosigner: Fazal Hassan DO at 2/3/2020  8:14 AM    Attestation signed by Fazal Hassan DO at 2/3/2020  8:14 AM    Follow the therapist's recommendation                    [facility] Certification Request    January 29, 2020      Patient: Livier Bowers  MR Number: [id number]  YOB: 1940  Date of Visit: 1/29/2020      Dear Lise Santoyo C*:    Thank you for this referral.   We are seeing Livier Bowers in Physical Therapy for Chronic left-sided low back pain with left-sided sciatica.    Medicare and/or Medicaid requires physician review and approval of the treatment plan. Please review the plan of care and verify that you agree with the therapy plan of care by co-signing this note.      Plan of Care  Authorization / Certification Start Date: 01/29/20  Authorization / Certification End Date: 04/03/20  Authorization / Certification Number of Visits: 10  Communication with: Referral Source  Patient Related Instruction: Nature of Condition;Expected outcome;Basis of treatment;Body mechanics;Treatment plan and rationale;Posture;Self Care instruction;Precautions;Next steps  Times per Week: 2-1  Number of Weeks: 6-8  Number of Visits: 10  Discharge Planning: to include self management strategy and HEP  Precautions / Restrictions : spondylolisthesis, osteoporosis  Therapeutic Exercise: ROM;Stretching;Strengthening  Neuromuscular Reeducation: posture;core  Manual Therapy: soft tissue mobilization;myofascial release;joint mobilization;muscle energy      Goals:  Pt. will demonstrate/verbalize independence in self-management of condition in : 6 weeks  Pt. will be independent with home exercise program in : 6  weeks  Pt. will improve posture : and demonstrate posture with minimal to no cuing;in 6 weeks  Pt. will be able to walk : 10 minutes;on even surfaces;with less difficulty;for household mobility;in 6 weeks;with less pain  Patient will stand : 30 minutes;with less pain;with less difficultty;for home chores;in 6 weeks  Patient will ascend / descend: stairs;with less pain;with less difficulty;in 6 weeks    No data recorded      If you have any questions or concerns, please don't hesitate to call.    Sincerely,      Armando Ortiz, PT      Physician:    For Medicare/MA patients:      Physician recommendation:     ___ Follow therapist's recommendation        ___ Modify therapy      *Physician co-signature indicates they certify the need for these services furnished within this plan and while under their care.             [facility]   [place]-Pel[facility] Initial Evaluation    Patient Name: Livier Bowers  Date of evaluation: 1/29/2020  Referral Diagnosis: Chronic left-sided low back pain with left-sided sciatica  Referring provider: Lise Francisco C*  Visit Diagnosis:     ICD-10-CM    1. Chronic left-sided low back pain with left-sided sciatica M54.42     G89.29    2. Spondylolisthesis of lumbar region M43.16    3. DDD (degenerative disc disease), lumbar M51.36    4. Lumbar facet arthropathy M47.816    5. Left lumbar radiculitis M54.16        Precautions / Restrictions : spondylolisthesis, osteoporosis       Assessment:      Impairments in  pain, posture, ROM, joint mobility, strength  Patient's signs and symptoms are consistent with medical diagnosis.  Patient responded well to manual therapy and HEP.  Prognosis to achieve goals is  good   Pt. is appropriate for skilled PT intervention as outlined in the Plan of Care (POC).    Goals:  Pt. will demonstrate/verbalize independence in self-management of condition in : 6 weeks  Pt. will be independent with home exercise program in : 6 weeks  Pt. will improve  posture : and demonstrate posture with minimal to no cuing;in 6 weeks  Pt. will be able to walk : 10 minutes;on even surfaces;with less difficulty;for household mobility;in 6 weeks;with less pain  Patient will stand : 30 minutes;with less pain;with less difficultty;for home chores;in 6 weeks  Patient will ascend / descend: stairs;with less pain;with less difficulty;in 6 weeks    No data recorded    Barriers to Learning or Achieving Goals:  No Barriers.    Patient's expectations/goals are realistic.    A shared decision making model was used.  The patient's values and choices were respected.  The following represents what was discussed and decided upon by the physical therapist and the patient.          Plan / Patient Instructions:        Plan of Care:   Authorization / Certification Start Date: 01/29/20  Authorization / Certification End Date: 04/03/20  Authorization / Certification Number of Visits: 10  Communication with: Referral Source  Patient Related Instruction: Nature of Condition;Expected outcome;Basis of treatment;Body mechanics;Treatment plan and rationale;Posture;Self Care instruction;Precautions;Next steps  Times per Week: 2-1  Number of Weeks: 6-8  Number of Visits: 10  Discharge Planning: to include self management strategy and HEP  Precautions / Restrictions : spondylolisthesis, osteoporosis  Therapeutic Exercise: ROM;Stretching;Strengthening  Neuromuscular Reeducation: posture;core  Manual Therapy: soft tissue mobilization;myofascial release;joint mobilization;muscle energy      POC and pathology of condition were reviewed with patient.  Pt. is in agreement with the Plan of Care  A Home Exercise Program (HEP) was initiated today.  Pt. was instructed in exercises by PT and patient was given a handout with detailed instructions.    Plan for next visit: review HEP, continue with manual therapy, progress exercises as tolerated.     Subjective:         Walking pain, left side, left leg pain tingling and  numbness to calf at times    History of Present Illness:    Livier is a 79 y.o. female who presents to therapy today with complaints of left side and leg pain with tingling into the calf. Date of onset:  Chronic, PT order 2020. Onset was gradual. Symptoms are constant. She denies history of similar symptoms. She describes their previous level of function as not limited    Pain Ratin  Pain rating at best: 1  Pain rating at worst: 7  Pain description: aching, numbness, pain, sharp, shooting and tingling    Functional limitations are described as occurring with:   ascending and descending stairs or curbs  standing    transitional movements sit to stand and sit to supine  walking             Objective:      Note: Items left blank indicates the item was not performed or not indicated at the time of the evaluation.    Patient Outcome Measures :    Modified Oswestry Low Back Pain Disablity Questionnaire  in %: 32     Scores range from 0-100%, where a score of 0% represents minimal pain and maximal function. The minimal clinically important difference is a score reduction of 12%.    Examination  1. Chronic left-sided low back pain with left-sided sciatica     2. Spondylolisthesis of lumbar region     3. DDD (degenerative disc disease), lumbar     4. Lumbar facet arthropathy     5. Left lumbar radiculitis       Precautions / Restrictions : spondylolisthesis, osteoporosis     Involved side: Left  Posture Observation:      Cervical:  Moderate forward head  Shoulder/Thoracic complex: Moderate bilateral scapular protraction   Moderately decreased upper thoracic kyphosis    Lumbar ROM:    Date: 20      *Indicate scale AROM AROM AROM   Lumbar Flexion mod     Lumbar Extension severe      Right Left Right Left Right Left   Lumbar Sidebending mod mod       Lumbar Rotation mod mod       Thoracic Flexion      Thoracic Extension      Thoracic Sidebending         Thoracic Rotation           Lower Extremity Strength:    Date:  "01/29/20      LE strength/5 Right Left Right Left Right Left   Hip Flexion (L1-3) 4 4       Hip Extension (L5-S1) 4 4       Hip Abduction (L4-5) 4 4       Hip Adduction (L2-3) 4 4       Hip External Rotation         Hip Internal Rotation         Knee Extension (L3-4) 5 5       Knee Flexion 4 4       Ankle Dorsiflexion (L4-5) 5 5       Great Toe Extension (L5)         Ankle Plantar flexion (S1)         Abdominals fair       Sensation            Reflex Testing:     Lumbar Dermatomes Right Left UE Reflexes Right Left   Iliac Crest and Groin (L1)   Biceps (C5-6)     Anterior Medial Thigh (L2)   Brachioradialis (C5-6)     Anterior Thigh, Medial Epicondyle Femur (L3)   Triceps (C7-8)     Lateral Thigh, Anterior Knee, Medial Leg/Malleolus (L4)   Hoffmanns test     Lateral Leg, Dorsal Foot (L5)   LE Reflexes     Lateral Foot (S1)   Patellar (L3-4)     Posterior Leg (S2)   Achilles (S1-2)     Other:   Babinski Response       Palpation and flexibiility:  Tight restricted quads, hamstrings, and hip flexors        Lumbar Special Tests:      Lumbar Special Tests Right Left SI Tests Right  Left   Quadrant test   SI Compression     Straight leg raise   SI Distraction     Crossover response   POSH Test     Slump  + Sacral Thrust     Sit-up test  FADIR     Trunk extensor endurance test  Resisted Abduction     Prone instability test  Other:     Pubic shotgun  Other:           Passive Mobility - Joint Integrity:  Not tested    LE Screen:  Hip PROM:  Limited extension and IR bilateral.  Hip Scour:  NT.    Treatment Today     Exercises:  Exercise #1: supine hip flexors stretch  Comment #1: 30\" x 2 bilateral  Exercise #2: posterior pelvic tilt  Comment #2: 5\" x 10  Exercise #3: LTR   Comment #3: 10 bilateral  Exercise #4: supine hamstring stretch/nerve glide  Comment #4: 10 bilateral         Manual therapy  MFR layers 1-3 bilateral: TFL, quad, hamstrings    TREATMENT MINUTES COMMENTS   Evaluation 20    Self-care/ Home management   "   Manual therapy 30    Neuromuscular Re-education     Therapeutic Activity     Therapeutic Exercises 15    Gait training     Modality__________________                Total 65    Blank areas are intentional and mean the treatment did not include these items.     PT Evaluation Code: (Please list factors)  Patient History/Comorbidities:   Patient Active Problem List   Diagnosis   ? Polycythemia vera (H)   ? Joint Pain, Localized In The Hip   ? Pain In The Arms   ? Vitamin D Deficiency   ? Adenoma Of The Parathyroid Gland   ? Osteoporosis   ? Postmenopausal Osteoporosis   ? Rotator cuff tear arthropathy of right shoulder   ? Anxiety      Past Medical History:   Diagnosis Date   ? Osteoporosis    ? Parathyroid adenoma    ? Polycythemia vera (H)    ? Vitamin D deficiency       Examination: as above  Clinical Presentation: stable  Clinical Decision Making: low complexity    Patient History/  Comorbidities Examination  (body structures and functions, activity limitations, and/or participation restrictions) Clinical Presentation Clinical Decision Making (Complexity)   No documented Comorbidities or personal factors 1-2 Elements Stable and/or uncomplicated Low   1-2 documented comorbidities or personal factor 3 Elements Evolving clinical presentation with changing characteristics Moderate   3-4 documented comorbidities or personal factors 4 or more Unstable and unpredictable High               Armando Ortiz, PT  1/29/2020  2:20 PM

## 2021-07-03 NOTE — ADDENDUM NOTE
Addendum Note by Keyla Canseco CMA at 12/14/2017  2:15 PM     Author: Keyla Canseco CMA Service: -- Author Type: Certified Medical Assistant    Filed: 12/14/2017  2:15 PM Encounter Date: 12/14/2017 Status: Signed    : Keyla Canseco CMA (Certified Medical Assistant)    Addended by: KEYLA CANSECO on: 12/14/2017 02:15 PM        Modules accepted: Orders

## 2021-07-03 NOTE — ADDENDUM NOTE
Addendum Note by Janna Guerrero CNP at 3/9/2020  1:30 PM     Author: Janna Guerrero CNP Service: -- Author Type: Nurse Practitioner    Filed: 3/11/2020  2:22 PM Encounter Date: 3/9/2020 Status: Signed    : Janna Guerrero CNP (Nurse Practitioner)    Addended by: JANNA GUERRERO on: 3/11/2020 02:22 PM        Modules accepted: Orders

## 2021-08-26 ENCOUNTER — LAB (OUTPATIENT)
Dept: INFUSION THERAPY | Facility: CLINIC | Age: 81
End: 2021-08-26
Payer: COMMERCIAL

## 2021-08-26 ENCOUNTER — ONCOLOGY VISIT (OUTPATIENT)
Dept: ONCOLOGY | Facility: CLINIC | Age: 81
End: 2021-08-26
Payer: COMMERCIAL

## 2021-08-26 VITALS
DIASTOLIC BLOOD PRESSURE: 82 MMHG | OXYGEN SATURATION: 100 % | SYSTOLIC BLOOD PRESSURE: 144 MMHG | WEIGHT: 122.7 LBS | RESPIRATION RATE: 16 BRPM | HEART RATE: 92 BPM | TEMPERATURE: 98 F | BODY MASS INDEX: 21.74 KG/M2

## 2021-08-26 DIAGNOSIS — Z79.899 HIGH RISK MEDICATION USE: ICD-10-CM

## 2021-08-26 DIAGNOSIS — D45 POLYCYTHEMIA VERA (H): Primary | ICD-10-CM

## 2021-08-26 DIAGNOSIS — D45 POLYCYTHEMIA VERA (H): ICD-10-CM

## 2021-08-26 LAB
ALBUMIN SERPL-MCNC: 4.1 G/DL (ref 3.5–5)
ALP SERPL-CCNC: 62 U/L (ref 45–120)
ALT SERPL W P-5'-P-CCNC: 16 U/L (ref 0–45)
ANION GAP SERPL CALCULATED.3IONS-SCNC: 12 MMOL/L (ref 5–18)
AST SERPL W P-5'-P-CCNC: 23 U/L (ref 0–40)
BASOPHILS # BLD AUTO: 0.1 10E3/UL (ref 0–0.2)
BASOPHILS NFR BLD AUTO: 1 %
BILIRUB SERPL-MCNC: 0.7 MG/DL (ref 0–1)
BUN SERPL-MCNC: 14 MG/DL (ref 8–28)
CALCIUM SERPL-MCNC: 9.4 MG/DL (ref 8.5–10.5)
CHLORIDE BLD-SCNC: 105 MMOL/L (ref 98–107)
CO2 SERPL-SCNC: 26 MMOL/L (ref 22–31)
CREAT SERPL-MCNC: 0.85 MG/DL (ref 0.6–1.1)
EOSINOPHIL # BLD AUTO: 0 10E3/UL (ref 0–0.7)
EOSINOPHIL NFR BLD AUTO: 1 %
ERYTHROCYTE [DISTWIDTH] IN BLOOD BY AUTOMATED COUNT: 13.5 % (ref 10–15)
GFR SERPL CREATININE-BSD FRML MDRD: 66 ML/MIN/1.73M2
GLUCOSE BLD-MCNC: 119 MG/DL (ref 70–125)
HCT VFR BLD AUTO: 46.3 % (ref 35–47)
HGB BLD-MCNC: 15.5 G/DL (ref 11.7–15.7)
IMM GRANULOCYTES # BLD: 0 10E3/UL
IMM GRANULOCYTES NFR BLD: 0 %
LYMPHOCYTES # BLD AUTO: 1.6 10E3/UL (ref 0.8–5.3)
LYMPHOCYTES NFR BLD AUTO: 23 %
MCH RBC QN AUTO: 37.3 PG (ref 26.5–33)
MCHC RBC AUTO-ENTMCNC: 33.5 G/DL (ref 31.5–36.5)
MCV RBC AUTO: 112 FL (ref 78–100)
MONOCYTES # BLD AUTO: 0.5 10E3/UL (ref 0–1.3)
MONOCYTES NFR BLD AUTO: 8 %
NEUTROPHILS # BLD AUTO: 4.8 10E3/UL (ref 1.6–8.3)
NEUTROPHILS NFR BLD AUTO: 67 %
NRBC # BLD AUTO: 0 10E3/UL
NRBC BLD AUTO-RTO: 0 /100
PLATELET # BLD AUTO: 251 10E3/UL (ref 150–450)
POTASSIUM BLD-SCNC: 4 MMOL/L (ref 3.5–5)
PROT SERPL-MCNC: 7 G/DL (ref 6–8)
RBC # BLD AUTO: 4.15 10E6/UL (ref 3.8–5.2)
SODIUM SERPL-SCNC: 143 MMOL/L (ref 136–145)
WBC # BLD AUTO: 7 10E3/UL (ref 4–11)

## 2021-08-26 PROCEDURE — 99214 OFFICE O/P EST MOD 30 MIN: CPT | Performed by: NURSE PRACTITIONER

## 2021-08-26 PROCEDURE — 82040 ASSAY OF SERUM ALBUMIN: CPT

## 2021-08-26 PROCEDURE — G0463 HOSPITAL OUTPT CLINIC VISIT: HCPCS

## 2021-08-26 PROCEDURE — 85025 COMPLETE CBC W/AUTO DIFF WBC: CPT

## 2021-08-26 PROCEDURE — 36415 COLL VENOUS BLD VENIPUNCTURE: CPT

## 2021-08-26 RX ORDER — HYDROXYUREA 500 MG/1
CAPSULE ORAL
Qty: 110 CAPSULE | Refills: 3 | Status: SHIPPED | OUTPATIENT
Start: 2021-08-26 | End: 2022-08-10

## 2021-08-26 ASSESSMENT — PAIN SCALES - GENERAL: PAINLEVEL: NO PAIN (0)

## 2021-08-26 NOTE — PROGRESS NOTES
"Oncology Rooming Note    August 26, 2021 2:05 PM   Livier Bowers is a 80 year old female who presents for:    Chief Complaint   Patient presents with     Oncology Clinic Visit     Initial Vitals: BP (!) 144/82   Pulse 92   Temp 98  F (36.7  C) (Oral)   Resp 16   Wt 55.7 kg (122 lb 11.2 oz)   SpO2 100%   BMI 21.74 kg/m   Estimated body mass index is 21.74 kg/m  as calculated from the following:    Height as of 1/16/20: 1.6 m (5' 3\").    Weight as of this encounter: 55.7 kg (122 lb 11.2 oz). Body surface area is 1.57 meters squared.  No Pain (0) Comment: Data Unavailable   No LMP recorded.  Allergies reviewed: Yes  Medications reviewed: Yes    Medications: Medication refills not needed today.  Pharmacy name entered into [facility]: [id number] [place] [address]    Clinical concerns:No concerns, just a check up.   KIERRA LEIVA                  "

## 2021-08-26 NOTE — PROGRESS NOTES
[facility] Hematology and Oncology Progress Note    Patient: Livier Bowers  MRN: [id number]  Date of Service: Aug 26, 2021         Reason for Visit:    Scheduled follow up    Assessment and Plan:    1.           Polycythemia, JAK2 and BCR-ABL negative:     80 year old     Suma sounds and states that she's been feeling good.  She's tolerating current dosing of Hydrea well and without appreciable side effects.  No signs/symptoms of infection.  Her appetite, weight and performance status are very stable.  She's adhering to covid restrictions and precautions.  She denies pain, cough, fever, chills, unusual headaches, visual or mentation disturbance, bowel or bladder issues, rash.      CMP - WNL.    CBC- WBC, differential, PLT and HgB WNL.  Hematocrit increased @ 46.3.  MCV elevated r/t Hydrea.    I reviewed Livier's labs with her.  Her hematocrit has increased above our goal of < 42 on current dosing of Hydrea.    Livier is tolerating current Hydrea dosing without incidence.     Increase Hydrea slightly to 500 mg/day Monday through Friday with 1000 mg on Saturday and Sunday - Rx renewed for 3 months.    Continue daily aspirin therapy.  She can only tolerate baby aspirin as RS aspirin causes her stomach to feel poorly.  Instructed to take ASA with a meal.     I reviewed [facility] covid-19 restrictions and precautions.  Suma is yet contemplating receiving the SARS-covid vaccine.    2-month HEME 2 with 4-month provider follow up with HEME1 and CMP.  Patient may schedule as telephone/video visit.    Hematologic History:     1.           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 Serum EPO was low normal.   ? Polycythemia initially appeared secondary, likely to COPD.  ? Phlebotomy was recommended, but she declined.   ? Sleep study was unremarkable.      Hydroxyurea was initiated at 500 mg daily; however, ineffective and therefore increased to 500 mg twice daily.   ? She  then cut back on her smoking and self manipulated the dose of Hydrea to take the least amount possible.   ? 2013, August - she quit smoking completely, switching to the E cigarette.  Hydrea was further decreased.  ? 2013, December - she was down to Hydrea 500 mg every other day and she opted to discontinue it.    2014, July - hematocrit elevated at 50.7 and hemoglobin 16.8.  She would only agree to restarting the Hydrea at 500 mg every other day.   ? Four week follow up in mid-August, 2014, found her Hct responding to low dose Hydrea. Wishing to be on the lowest dose possible of Hydrea she wanted to try every 3rd day Hydrea, 500 mg, as she was able to be off Hydrea therapy for approximately 7 months before her hemoglobin and hematocrit lorenzo to the lever of being concerning and therapy resumed.     ? 2014, September follow up found the Hct and HgB on the rise. She agreed to return to every other day dosing with Hydrea, 500 mg.    2015, January 19 - agreed to try phlebotomy and Hydrea was discontinued.   ? Prior phlebotomies:    2020 - 03/09, 01/07.    2019 - 01/19, 03/20, 06/12, 09/10, 10/08, 11/05.    2018 - 02/08, 04/25, 06/26, 08/29, 11/06.    2017 - 01/06, 02/01, 03/08, 04/14, 09/21, 11/02, 12/14.    2016 - 10/28, 10/07, 09/21, 05/24, 02/05, 01/22.    2015 - 10/22, 07/22, 05/29, 05/18, 04/06, 03/09, 01/28.    03/09/20 - With onset of iron deficiency, stopped phlebotomies.  Not anemic.  Resumed Hydrea, 500 mg every other day.   ? 03/16/20 Erythropoietin - WNL @ 11.    If EPO low - BM bx.  If EPO high - likely r/t her COPD.    03/23/20 Jak2 exon12, MPL and ADI testing - no mutations identified.     04/20/2020 - Hydrea increased to 500 mg daily (HcT @ 46.8).     12/22/2020 - Hydrea increased to 500 mg Monday - Saturday with 1000 mg on Sunday (HcT @ 45.5).     08/26/2021 - Hydrea increased to 500 mg Monday through Friday with 1000 mg Saturday and Saturday (hematocrit 46.3).     ECOG Performance:    0 -  Independent    Pain:    Pain Score: No Pain (0)    Encounter Diagnoses:    Problem List Items Addressed This Visit        Hematologic    Polycythemia vera (H) - Primary    Relevant Medications    hydroxyurea (HYDREA) 500 MG capsule    Other Relevant Orders    CBC with platelets      Other Visit Diagnoses     High risk medication use                 Total time 32 minutes.    Luke Guerrero, CNP     CC: Va Patten MD   ______________________________________________________________________________    Review of Systems:    No fever or night sweats.  No loss of weight.  No lumps or bumps anywhere.  No unusual headaches or eyesight issues.  No dizziness.  No bleeding from the nose.  No sores in the mouth. No problems with swallowing.  No chest pain. No shortness of breath. No cough.  No abdominal pain. No nausea or vomiting.  No diarrhea or constipation.  No blood in stool or black colored stools.  No problems passing urine.  No numbness or tingling in hands or feet.  No skin rashes.    A 14 point review of systems is otherwise negative.    Past History:    Past Medical History:   Diagnosis Date     Osteoporosis      Parathyroid adenoma      Polycythemia vera (H)      Vitamin D deficiency        Past Surgical History:   Procedure Laterality Date     CATARACT EXTRACTION Left      DILATION AND CURETTAGE       KNEE SURGERY      torn meniscus     THYROIDECTOMY, PARTIAL N/A 12/2/2014    Procedure: RIGHT PARATHYROID EXCISION ;  Surgeon: Tressa Louis MD;  Location: [facility];  Service:      Physical Exam:    BP (!) 144/82   Pulse 92   Temp 98  F (36.7  C) (Oral)   Resp 16   Wt 55.7 kg (122 lb 11.2 oz)   SpO2 100%   BMI 21.74 kg/m      GENERAL:   Alert and oriented. Seated comfortably. In no distress.    HEENT:   Atraumatic and normocephalic.  RAVINDER.  EOMI.  No pallor.  No icterus.  No mucosal lesions.    LYMPH NODES:  No palpable cervical, axillary or inguinal lymphadenopathy.    CHEST:   Lungs  clear to auscultation bilaterally.    CVS:    S1 and S2 are heard. Regular rate and rhythm.  No murmur, gallop or rub heard.  No peripheral edema.    ABDOMEN:   Soft. Not tender. Not distended.  No palpable hepatomegaly or splenomegaly.    EXTREMITIES:  Warm.    SKIN:    No rash, bruising or purpura noted.  Full head of hair.    Lab Results:    Reviewed with patient.    Recent Results (from the past 168 hour(s))   Comprehensive metabolic panel   Result Value Ref Range    Sodium 143 136 - 145 mmol/L    Potassium 4.0 3.5 - 5.0 mmol/L    Chloride 105 98 - 107 mmol/L    Carbon Dioxide (CO2) 26 22 - 31 mmol/L    Anion Gap 12 5 - 18 mmol/L    Urea Nitrogen 14 8 - 28 mg/dL    Creatinine 0.85 0.60 - 1.10 mg/dL    Calcium 9.4 8.5 - 10.5 mg/dL    Glucose 119 70 - 125 mg/dL    Alkaline Phosphatase 62 45 - 120 U/L    AST 23 0 - 40 U/L    ALT 16 0 - 45 U/L    Protein Total 7.0 6.0 - 8.0 g/dL    Albumin 4.1 3.5 - 5.0 g/dL    Bilirubin Total 0.7 0.0 - 1.0 mg/dL    GFR Estimate 66 >60 mL/min/1.73m2   CBC with platelets and differential   Result Value Ref Range    WBC Count 7.0 4.0 - 11.0 10e3/uL    RBC Count 4.15 3.80 - 5.20 10e6/uL    Hemoglobin 15.5 11.7 - 15.7 g/dL    Hematocrit 46.3 35.0 - 47.0 %     (H) 78 - 100 fL    MCH 37.3 (H) 26.5 - 33.0 pg    MCHC 33.5 31.5 - 36.5 g/dL    RDW 13.5 10.0 - 15.0 %    Platelet Count 251 150 - 450 10e3/uL    % Neutrophils 67 %    % Lymphocytes 23 %    % Monocytes 8 %    % Eosinophils 1 %    % Basophils 1 %    % Immature Granulocytes 0 %    NRBCs per 100 WBC 0 <1 /100    Absolute Neutrophils 4.8 1.6 - 8.3 10e3/uL    Absolute Lymphocytes 1.6 0.8 - 5.3 10e3/uL    Absolute Monocytes 0.5 0.0 - 1.3 10e3/uL    Absolute Eosinophils 0.0 0.0 - 0.7 10e3/uL    Absolute Basophils 0.1 0.0 - 0.2 10e3/uL    Absolute Immature Granulocytes 0.0 <=0.0 10e3/uL    Absolute NRBCs 0.0 10e3/uL     Imaging:    No results found.

## 2021-08-26 NOTE — LETTER
"    8/26/2021         RE: Livier Bowers  [address]  [facility] [address]        Dear Colleague,    Thank you for referring your patient, Livier Bowers, to the [facility] CANCER [facility]. Please see a copy of my visit note below.    Oncology Rooming Note    August 26, 2021 2:05 PM   Livier Bowers is a 80 year old female who presents for:    Chief Complaint   Patient presents with     Oncology Clinic Visit     Initial Vitals: BP (!) 144/82   Pulse 92   Temp 98  F (36.7  C) (Oral)   Resp 16   Wt 55.7 kg (122 lb 11.2 oz)   SpO2 100%   BMI 21.74 kg/m   Estimated body mass index is 21.74 kg/m  as calculated from the following:    Height as of 1/16/20: 1.6 m (5' 3\").    Weight as of this encounter: 55.7 kg (122 lb 11.2 oz). Body surface area is 1.57 meters squared.  No Pain (0) Comment: Data Unavailable   No LMP recorded.  Allergies reviewed: Yes  Medications reviewed: Yes    Medications: Medication refills not needed today.  Pharmacy [facility] into [id number] [place] [address]    Clinical concerns:No concerns, just a check up.   KIERRA LEIVA                    [facility] Hematology and Oncology Progress Note    Patient: Livier Bowers  MRN: [id number]  Date of Service: Aug 26, 2021         Reason for Visit:    Scheduled follow up    Assessment and Plan:    1.           Polycythemia, JAK2 and BCR-ABL negative:     80 year old     Suma sounds and states that she's been feeling good.  She's tolerating current dosing of Hydrea well and without appreciable side effects.  No signs/symptoms of infection.  Her appetite, weight and performance status are very stable.  She's adhering to covid restrictions and precautions.  She denies pain, cough, fever, chills, unusual headaches, visual or mentation disturbance, bowel or bladder issues, rash.      CMP - WNL.    CBC- WBC, differential, PLT and HgB WNL.  Hematocrit increased @ 46.3.  MCV elevated r/t " Hydrea.    I reviewed Livier's labs with her.  Her hematocrit has increased above our goal of < 42 on current dosing of Hydrea.    Livier is tolerating current Hydrea dosing without incidence.     Increase Hydrea slightly to 500 mg/day Monday through Friday with 1000 mg on Saturday and Sunday - Rx renewed for 3 months.    Continue daily aspirin therapy.  She can only tolerate baby aspirin as RS aspirin causes her stomach to feel poorly.  Instructed to take ASA with a meal.     [facility] covid-[facility] restrictions and precautions.  Suma is yet contemplating receiving the SARS-covid vaccine.    2-month HEME 2 with 4-month provider follow up with HEME1 and CMP.  Patient may schedule as telephone/video visit.    Hematologic History:     1.           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 Serum EPO was low normal.   ? Polycythemia initially appeared secondary, likely to COPD.  ? Phlebotomy was recommended, but she declined.   ? Sleep study was unremarkable.      Hydroxyurea was initiated at 500 mg daily; however, ineffective and therefore increased to 500 mg twice daily.   ? She then cut back on her smoking and self manipulated the dose of Hydrea to take the least amount possible.   ? 2013, August - she quit smoking completely, switching to the E cigarette.  Hydrea was further decreased.  ? 2013, December - she was down to Hydrea 500 mg every other day and she opted to discontinue it.    2014, July - hematocrit elevated at 50.7 and hemoglobin 16.8.  She would only agree to restarting the Hydrea at 500 mg every other day.   ? Four week follow up in mid-August, 2014, found her Hct responding to low dose Hydrea. Wishing to be on the lowest dose possible of Hydrea she wanted to try every 3rd day Hydrea, 500 mg, as she was able to be off Hydrea therapy for approximately 7 months before her hemoglobin and hematocrit lorenzo to the lever of being concerning and therapy resumed.      ? 2014, September follow up found the Hct and HgB on the rise. She agreed to return to every other day dosing with Hydrea, 500 mg.    2015, January 19 - agreed to try phlebotomy and Hydrea was discontinued.   ? Prior phlebotomies:    2020 - 03/09, 01/07.    2019 - 01/19, 03/20, 06/12, 09/10, 10/08, 11/05.    2018 - 02/08, 04/25, 06/26, 08/29, 11/06.    2017 - 01/06, 02/01, 03/08, 04/14, 09/21, 11/02, 12/14.    2016 - 10/28, 10/07, 09/21, 05/24, 02/05, 01/22.    2015 - 10/22, 07/22, 05/29, 05/18, 04/06, 03/09, 01/28.    03/09/20 - With onset of iron deficiency, stopped phlebotomies.  Not anemic.  Resumed Hydrea, 500 mg every other day.   ? 03/16/20 Erythropoietin - WNL @ 11.    If EPO low - BM bx.  If EPO high - likely r/t her COPD.    03/23/20 Jak2 exon12, MPL and ADI testing - no mutations identified.     04/20/2020 - Hydrea increased to 500 mg daily (HcT @ 46.8).     12/22/2020 - Hydrea increased to 500 mg Monday - Saturday with 1000 mg on Sunday (HcT @ 45.5).     08/26/2021 - Hydrea increased to 500 mg Monday through Friday with 1000 mg Saturday and Saturday (hematocrit 46.3).     ECOG Performance:    0 - Independent    Pain:    Pain Score: No Pain (0)    Encounter Diagnoses:    Problem List Items Addressed This Visit        Hematologic    Polycythemia vera (H) - Primary    Relevant Medications    hydroxyurea (HYDREA) 500 MG capsule    Other Relevant Orders    CBC with platelets      Other Visit Diagnoses     High risk medication use                 Total time 32 minutes.    Luke Guerrero CNP     CC: Va Patten MD   ______________________________________________________________________________    Review of Systems:    No fever or night sweats.  No loss of weight.  No lumps or bumps anywhere.  No unusual headaches or eyesight issues.  No dizziness.  No bleeding from the nose.  No sores in the mouth. No problems with swallowing.  No chest pain. No shortness of breath. No cough.  No abdominal pain. No  nausea or vomiting.  No diarrhea or constipation.  No blood in stool or black colored stools.  No problems passing urine.  No numbness or tingling in hands or feet.  No skin rashes.    A 14 point review of systems is otherwise negative.    Past History:    Past Medical History:   Diagnosis Date     Osteoporosis      Parathyroid adenoma      Polycythemia vera (H)      Vitamin D deficiency        Past Surgical History:   Procedure Laterality Date     CATARACT EXTRACTION Left      DILATION AND CURETTAGE       KNEE SURGERY      torn meniscus     THYROIDECTOMY, PARTIAL N/A 12/2/2014    Procedure: RIGHT PARATHYROID EXCISION ;  Surgeon: Tressa Louis MD;  Location: [facility];  Service:      Physical Exam:    BP (!) 144/82   Pulse 92   Temp 98  F (36.7  C) (Oral)   Resp 16   Wt 55.7 kg (122 lb 11.2 oz)   SpO2 100%   BMI 21.74 kg/m      GENERAL:   Alert and oriented. Seated comfortably. In no distress.    HEENT:   Atraumatic and normocephalic.  RAVINDER.  EOMI.  No pallor.  No icterus.  No mucosal lesions.    LYMPH NODES:  No palpable cervical, axillary or inguinal lymphadenopathy.    CHEST:   Lungs clear to auscultation bilaterally.    CVS:    S1 and S2 are heard. Regular rate and rhythm.  No murmur, gallop or rub heard.  No peripheral edema.    ABDOMEN:   Soft. Not tender. Not distended.  No palpable hepatomegaly or splenomegaly.    EXTREMITIES:  Warm.    SKIN:    No rash, bruising or purpura noted.  Full head of hair.    Lab Results:    Reviewed with patient.    Recent Results (from the past 168 hour(s))   Comprehensive metabolic panel   Result Value Ref Range    Sodium 143 136 - 145 mmol/L    Potassium 4.0 3.5 - 5.0 mmol/L    Chloride 105 98 - 107 mmol/L    Carbon Dioxide (CO2) 26 22 - 31 mmol/L    Anion Gap 12 5 - 18 mmol/L    Urea Nitrogen 14 8 - 28 mg/dL    Creatinine 0.85 0.60 - 1.10 mg/dL    Calcium 9.4 8.5 - 10.5 mg/dL    Glucose 119 70 - 125 mg/dL    Alkaline Phosphatase 62 45 - 120 U/L     AST 23 0 - 40 U/L    ALT 16 0 - 45 U/L    Protein Total 7.0 6.0 - 8.0 g/dL    Albumin 4.1 3.5 - 5.0 g/dL    Bilirubin Total 0.7 0.0 - 1.0 mg/dL    GFR Estimate 66 >60 mL/min/1.73m2   CBC with platelets and differential   Result Value Ref Range    WBC Count 7.0 4.0 - 11.0 10e3/uL    RBC Count 4.15 3.80 - 5.20 10e6/uL    Hemoglobin 15.5 11.7 - 15.7 g/dL    Hematocrit 46.3 35.0 - 47.0 %     (H) 78 - 100 fL    MCH 37.3 (H) 26.5 - 33.0 pg    MCHC 33.5 31.5 - 36.5 g/dL    RDW 13.5 10.0 - 15.0 %    Platelet Count 251 150 - 450 10e3/uL    % Neutrophils 67 %    % Lymphocytes 23 %    % Monocytes 8 %    % Eosinophils 1 %    % Basophils 1 %    % Immature Granulocytes 0 %    NRBCs per 100 WBC 0 <1 /100    Absolute Neutrophils 4.8 1.6 - 8.3 10e3/uL    Absolute Lymphocytes 1.6 0.8 - 5.3 10e3/uL    Absolute Monocytes 0.5 0.0 - 1.3 10e3/uL    Absolute Eosinophils 0.0 0.0 - 0.7 10e3/uL    Absolute Basophils 0.1 0.0 - 0.2 10e3/uL    Absolute Immature Granulocytes 0.0 <=0.0 10e3/uL    Absolute NRBCs 0.0 10e3/uL     Imaging:    No results found.          Again, thank you for allowing me to participate in the care of your patient.        Sincerely,        Luke Guerrero, CNP

## 2021-08-26 NOTE — PATIENT INSTRUCTIONS
Recent Results (from the past 24 hour(s))   Comprehensive metabolic panel    Collection Time: 08/26/21  1:44 PM   Result Value Ref Range    Sodium 143 136 - 145 mmol/L    Potassium 4.0 3.5 - 5.0 mmol/L    Chloride 105 98 - 107 mmol/L    Carbon Dioxide (CO2) 26 22 - 31 mmol/L    Anion Gap 12 5 - 18 mmol/L    Urea Nitrogen 14 8 - 28 mg/dL    Creatinine 0.85 0.60 - 1.10 mg/dL    Calcium 9.4 8.5 - 10.5 mg/dL    Glucose 119 70 - 125 mg/dL    Alkaline Phosphatase 62 45 - 120 U/L    AST 23 0 - 40 U/L    ALT 16 0 - 45 U/L    Protein Total 7.0 6.0 - 8.0 g/dL    Albumin 4.1 3.5 - 5.0 g/dL    Bilirubin Total 0.7 0.0 - 1.0 mg/dL    GFR Estimate 66 >60 mL/min/1.73m2   CBC with platelets and differential    Collection Time: 08/26/21  1:44 PM   Result Value Ref Range    WBC Count 7.0 4.0 - 11.0 10e3/uL    RBC Count 4.15 3.80 - 5.20 10e6/uL    Hemoglobin 15.5 11.7 - 15.7 g/dL    Hematocrit 46.3 35.0 - 47.0 %     (H) 78 - 100 fL    MCH 37.3 (H) 26.5 - 33.0 pg    MCHC 33.5 31.5 - 36.5 g/dL    RDW 13.5 10.0 - 15.0 %    Platelet Count 251 150 - 450 10e3/uL    % Neutrophils 67 %    % Lymphocytes 23 %    % Monocytes 8 %    % Eosinophils 1 %    % Basophils 1 %    % Immature Granulocytes 0 %    NRBCs per 100 WBC 0 <1 /100    Absolute Neutrophils 4.8 1.6 - 8.3 10e3/uL    Absolute Lymphocytes 1.6 0.8 - 5.3 10e3/uL    Absolute Monocytes 0.5 0.0 - 1.3 10e3/uL    Absolute Eosinophils 0.0 0.0 - 0.7 10e3/uL    Absolute Basophils 0.1 0.0 - 0.2 10e3/uL    Absolute Immature Granulocytes 0.0 <=0.0 10e3/uL    Absolute NRBCs 0.0 10e3/uL

## 2021-10-27 ENCOUNTER — LAB (OUTPATIENT)
Dept: INFUSION THERAPY | Facility: CLINIC | Age: 81
End: 2021-10-27
Attending: NURSE PRACTITIONER
Payer: COMMERCIAL

## 2021-10-27 DIAGNOSIS — D45 POLYCYTHEMIA VERA (H): ICD-10-CM

## 2021-10-27 LAB
ERYTHROCYTE [DISTWIDTH] IN BLOOD BY AUTOMATED COUNT: 14.3 % (ref 10–15)
HCT VFR BLD AUTO: 44.6 % (ref 35–47)
HGB BLD-MCNC: 15 G/DL (ref 11.7–15.7)
MCH RBC QN AUTO: 38.6 PG (ref 26.5–33)
MCHC RBC AUTO-ENTMCNC: 33.6 G/DL (ref 31.5–36.5)
MCV RBC AUTO: 115 FL (ref 78–100)
PLATELET # BLD AUTO: 239 10E3/UL (ref 150–450)
RBC # BLD AUTO: 3.89 10E6/UL (ref 3.8–5.2)
WBC # BLD AUTO: 6.6 10E3/UL (ref 4–11)

## 2021-10-27 PROCEDURE — 85014 HEMATOCRIT: CPT

## 2021-10-27 PROCEDURE — 36415 COLL VENOUS BLD VENIPUNCTURE: CPT

## 2021-10-28 ENCOUNTER — TELEPHONE (OUTPATIENT)
Dept: ONCOLOGY | Facility: CLINIC | Age: 81
End: 2021-10-28

## 2021-10-28 NOTE — TELEPHONE ENCOUNTER
Luke Guerrero, Tiarra Adams RN  OK to leave Hydrea dose as is - Hct is going in right direction.     Called patient with this message.  She verbalized understanding. Reminded her of her 12/29 appt with Dr. Diaz. She was not aware of this and said she was not available this day.  Transferred to WARREN Ballard to mitchell/IBAN Andrade RN

## 2021-12-02 ENCOUNTER — MEDICAL CORRESPONDENCE (OUTPATIENT)
Dept: HEALTH INFORMATION MANAGEMENT | Facility: CLINIC | Age: 81
End: 2021-12-02
Payer: COMMERCIAL

## 2022-01-01 DIAGNOSIS — D45 POLYCYTHEMIA VERA (H): Primary | ICD-10-CM

## 2022-01-01 DIAGNOSIS — Z79.899 HIGH RISK MEDICATION USE: ICD-10-CM

## 2022-01-04 ENCOUNTER — LAB (OUTPATIENT)
Dept: INFUSION THERAPY | Facility: CLINIC | Age: 82
End: 2022-01-04
Attending: INTERNAL MEDICINE
Payer: COMMERCIAL

## 2022-01-04 ENCOUNTER — ONCOLOGY VISIT (OUTPATIENT)
Dept: ONCOLOGY | Facility: CLINIC | Age: 82
End: 2022-01-04
Attending: INTERNAL MEDICINE
Payer: COMMERCIAL

## 2022-01-04 VITALS
HEART RATE: 104 BPM | WEIGHT: 121 LBS | DIASTOLIC BLOOD PRESSURE: 91 MMHG | HEIGHT: 63 IN | RESPIRATION RATE: 16 BRPM | BODY MASS INDEX: 21.44 KG/M2 | SYSTOLIC BLOOD PRESSURE: 149 MMHG | OXYGEN SATURATION: 99 %

## 2022-01-04 DIAGNOSIS — Z79.899 HIGH RISK MEDICATION USE: ICD-10-CM

## 2022-01-04 DIAGNOSIS — D45 POLYCYTHEMIA VERA (H): Primary | ICD-10-CM

## 2022-01-04 DIAGNOSIS — D45 POLYCYTHEMIA VERA (H): ICD-10-CM

## 2022-01-04 LAB
ALBUMIN SERPL-MCNC: 4.1 G/DL (ref 3.5–5)
ALP SERPL-CCNC: 60 U/L (ref 45–120)
ALT SERPL W P-5'-P-CCNC: 15 U/L (ref 0–45)
ANION GAP SERPL CALCULATED.3IONS-SCNC: 13 MMOL/L (ref 5–18)
AST SERPL W P-5'-P-CCNC: 22 U/L (ref 0–40)
BILIRUB SERPL-MCNC: 0.4 MG/DL (ref 0–1)
BUN SERPL-MCNC: 13 MG/DL (ref 8–28)
CALCIUM SERPL-MCNC: 9.2 MG/DL (ref 8.5–10.5)
CHLORIDE BLD-SCNC: 105 MMOL/L (ref 98–107)
CO2 SERPL-SCNC: 24 MMOL/L (ref 22–31)
CREAT SERPL-MCNC: 0.8 MG/DL (ref 0.6–1.1)
ERYTHROCYTE [DISTWIDTH] IN BLOOD BY AUTOMATED COUNT: 13.2 % (ref 10–15)
GFR SERPL CREATININE-BSD FRML MDRD: 75 ML/MIN/1.73M2
GLUCOSE BLD-MCNC: 89 MG/DL (ref 70–125)
HCT VFR BLD AUTO: 40.1 % (ref 35–47)
HGB BLD-MCNC: 13.3 G/DL (ref 11.7–15.7)
MCH RBC QN AUTO: 38.4 PG (ref 26.5–33)
MCHC RBC AUTO-ENTMCNC: 33.2 G/DL (ref 31.5–36.5)
MCV RBC AUTO: 116 FL (ref 78–100)
PLATELET # BLD AUTO: 241 10E3/UL (ref 150–450)
POTASSIUM BLD-SCNC: 4 MMOL/L (ref 3.5–5)
PROT SERPL-MCNC: 6.7 G/DL (ref 6–8)
RBC # BLD AUTO: 3.46 10E6/UL (ref 3.8–5.2)
SODIUM SERPL-SCNC: 142 MMOL/L (ref 136–145)
WBC # BLD AUTO: 5.5 10E3/UL (ref 4–11)

## 2022-01-04 PROCEDURE — 36415 COLL VENOUS BLD VENIPUNCTURE: CPT

## 2022-01-04 PROCEDURE — 82040 ASSAY OF SERUM ALBUMIN: CPT

## 2022-01-04 PROCEDURE — 85027 COMPLETE CBC AUTOMATED: CPT

## 2022-01-04 PROCEDURE — 99214 OFFICE O/P EST MOD 30 MIN: CPT | Performed by: INTERNAL MEDICINE

## 2022-01-04 PROCEDURE — G0463 HOSPITAL OUTPT CLINIC VISIT: HCPCS

## 2022-01-04 PROCEDURE — 80053 COMPREHEN METABOLIC PANEL: CPT

## 2022-01-04 ASSESSMENT — MIFFLIN-ST. JEOR: SCORE: 992.98

## 2022-01-04 ASSESSMENT — PAIN SCALES - GENERAL: PAINLEVEL: NO PAIN (0)

## 2022-01-04 NOTE — LETTER
"    1/4/2022         RE: Livier Bowers  [address] [place] [address]        Dear Colleague,    Thank you for referring your patient, Livier Bowers, to the [facility] CANCER [facility]. Please see a copy of my visit note below.    Oncology Rooming Note    January 4, 2022 1:12 PM   Livier Bowers is a 81 year old female who presents for:    Chief Complaint   Patient presents with     Oncology Clinic Visit     Initial Vitals: Wt 54.9 kg (121 lb)   BMI 21.43 kg/m   Estimated body mass index is 21.43 kg/m  as calculated from the following:    Height as of 1/16/20: 1.6 m (5' 3\").    Weight as of this encounter: 54.9 kg (121 lb). Body surface area is 1.56 meters squared.  No Pain (0) Comment: Data Unavailable   No LMP recorded.  Allergies reviewed: Yes  Medications reviewed: Yes    Medications: Medication refills not needed today.  Pharm[facility] into [id number] [place] [address]    Clinical concerns:  4 month follow up    Kristina Murguia              [facility] Hematology and Oncology Progress Note    Patient: Livier Bowers  MRN: [id number]  Date of Service: Jan 4, 2022         Reason for Visit    Chief Complaint   Patient presents with     Oncology Clinic Visit       Assessment and Plan    Cancer Staging  No matching staging information was found for the patient.    ECOG Performance    0 - Independent     Pain  Pain Score: No Pain (0)    #. Polycythemia. JAK2 and BCR-ABL negative   Clinically stable. Labs reviewed. Hgb and hematocrit at low target level. CMP unremarkable. Neuropathy is not likely from Hydrea. No report of venous or arterial thrombosis.   Ok to continue Hydrea at current dose of 500 mg 4 days a week/ 1000 mg 2 days a week, but might need to lower the dose if hematocrit is persistently less than 42.   Continue ASA 81 mg daily.    RTC labs/provider in 3 months.       Encounter Diagnoses:    Problem List Items Addressed This Visit  " "      Oncology Diagnoses    Polycythemia vera (H) - Primary    Relevant Orders    CBC with Platelets & Differential    Comprehensive metabolic panel      Other Visit Diagnoses     High risk medication use        Relevant Orders    CBC with Platelets & Differential    Comprehensive metabolic panel           CC: Va Patten MD   ______________________________________________________________________________    History of Present Illness    Ms. Livier Bowers is here for follow up.   She reported more tingling in hands on the left side from flare of spinal issue of over 10 years. She will be follow up with spine clinic.  She takes Hydrea regularly. No recalled side effects but she was wondering her worsening neuropathy is from Hydrea.    New to me. Reviewed diagnosis and past treatment reviewed in details.    Review of systems  Apart from describing in HPI, the remainder of comprehensive ROS was negative.    Past History    Past Medical History:   Diagnosis Date     Osteoporosis      Parathyroid adenoma      Polycythemia vera (H)      Vitamin D deficiency        Past Surgical History:   Procedure Laterality Date     CATARACT EXTRACTION Left      DILATION AND CURETTAGE       KNEE SURGERY      torn meniscus     THYROIDECTOMY, PARTIAL N/A 12/2/2014    Procedure: RIGHT PARATHYROID EXCISION ;  Surgeon: Tressa Louis MD;  Location: [facility];  Service:          Physical Exam    BP (!) 149/91 (BP Location: Right arm, Patient Position: Sitting, Cuff Size: Adult Regular)   Pulse 104   Resp 16   Ht 1.6 m (5' 3\")   Wt 54.9 kg (121 lb)   SpO2 99%   BMI 21.43 kg/m        General: alert, awake, not in acute distress  HEENT: Head: Normal, normocephalic, atraumatic.  Eye: Normal external eye, conjunctiva, lids cornea, RAVINDER.  Nose: Normal external nose, mucus membranes and septum.  Pharynx: Normal buccal mucosa. Normal pharynx.  Neck / Thyroid: Supple, no masses, nodes, nodules or " enlargement.  Lymphatics: No abnormally enlarged lymph nodes.  Extremities: normal strength, tone, and muscle mass  Skin: normal. no rash or abnormalities  CNS: non focal.    Lab Results    Recent Results (from the past 168 hour(s))   CBC with platelets   Result Value Ref Range    WBC Count 5.5 4.0 - 11.0 10e3/uL    RBC Count 3.46 (L) 3.80 - 5.20 10e6/uL    Hemoglobin 13.3 11.7 - 15.7 g/dL    Hematocrit 40.1 35.0 - 47.0 %     (H) 78 - 100 fL    MCH 38.4 (H) 26.5 - 33.0 pg    MCHC 33.2 31.5 - 36.5 g/dL    RDW 13.2 10.0 - 15.0 %    Platelet Count 241 150 - 450 10e3/uL   Comprehensive metabolic panel   Result Value Ref Range    Sodium 142 136 - 145 mmol/L    Potassium 4.0 3.5 - 5.0 mmol/L    Chloride 105 98 - 107 mmol/L    Carbon Dioxide (CO2) 24 22 - 31 mmol/L    Anion Gap 13 5 - 18 mmol/L    Urea Nitrogen 13 8 - 28 mg/dL    Creatinine 0.80 0.60 - 1.10 mg/dL    Calcium 9.2 8.5 - 10.5 mg/dL    Glucose 89 70 - 125 mg/dL    Alkaline Phosphatase 60 45 - 120 U/L    AST 22 0 - 40 U/L    ALT 15 0 - 45 U/L    Protein Total 6.7 6.0 - 8.0 g/dL    Albumin 4.1 3.5 - 5.0 g/dL    Bilirubin Total 0.4 0.0 - 1.0 mg/dL    GFR Estimate 75 >60 mL/min/1.73m2       Imaging    No results found.    I spent 30 minutes on the date of service and >50% was spent on coordination and counseling of care.    Signed by: Deirdre Diaz MD        Again, thank you for allowing me to participate in the care of your patient.        Sincerely,        Deirdre Diaz MD

## 2022-01-04 NOTE — PROGRESS NOTES
"Oncology Rooming Note    January 4, 2022 1:12 PM   iLvier Bowers is a 81 year old female who presents for:    Chief Complaint   Patient presents with     Oncology Clinic Visit     Initial Vitals: Wt 54.9 kg (121 lb)   BMI 21.43 kg/m   Estimated body mass index is 21.43 kg/m  as calculated from the following:    Height as of 1/16/20: 1.6 m (5' 3\").    Weight as of this encounter: 54.9 kg (121 lb). Body surface area is 1.56 meters squared.  No Pain (0) Comment: Data Unavailable   No LMP recorded.  Allergies reviewed: Yes  Medications reviewed: Yes    Medications: Medication refills not needed today.  Pharmacy [facility] into [id number] [place] [address]    Clinical concerns:  4 month follow up    Kristina Murguia            "

## 2022-01-11 NOTE — PROGRESS NOTES
[facility] Hematology and Oncology Progress Note    Patient: Livier Bowers  MRN: [id number]  Date of Service: Jan 4, 2022         Reason for Visit    Chief Complaint   Patient presents with     Oncology Clinic Visit       Assessment and Plan    Cancer Staging  No matching staging information was found for the patient.    ECOG Performance    0 - Independent     Pain  Pain Score: No Pain (0)    #. Polycythemia. JAK2 and BCR-ABL negative   Clinically stable. Labs reviewed. Hgb and hematocrit at low target level. CMP unremarkable. Neuropathy is not likely from Hydrea. No report of venous or arterial thrombosis.   Ok to continue Hydrea at current dose of 500 mg 4 days a week/ 1000 mg 2 days a week, but might need to lower the dose if hematocrit is persistently less than 42.   Continue ASA 81 mg daily.    RTC labs/provider in 3 months.       Encounter Diagnoses:    Problem List Items Addressed This Visit        Oncology Diagnoses    Polycythemia vera (H) - Primary    Relevant Orders    CBC with Platelets & Differential    Comprehensive metabolic panel      Other Visit Diagnoses     High risk medication use        Relevant Orders    CBC with Platelets & Differential    Comprehensive metabolic panel           CC: aV Patten MD   ______________________________________________________________________________    History of Present Illness    Ms. Livier Bowers is here for follow up.   She reported more tingling in hands on the left side from flare of spinal issue of over 10 years. She will be follow up with spine clinic.  She takes Hydrea regularly. No recalled side effects but she was wondering her worsening neuropathy is from Hydrea.    New to me. Reviewed diagnosis and past treatment reviewed in details.    Review of systems  Apart from describing in HPI, the remainder of comprehensive ROS was negative.    Past History    Past Medical History:   Diagnosis Date     Osteoporosis      Parathyroid adenoma       "Polycythemia vera (H)      Vitamin D deficiency        Past Surgical History:   Procedure Laterality Date     CATARACT EXTRACTION Left      DILATION AND CURETTAGE       KNEE SURGERY      torn meniscus     THYROIDECTOMY, PARTIAL N/A 12/2/2014    Procedure: RIGHT PARATHYROID EXCISION ;  Surgeon: Tressa Louis MD;  Location: [facility];  Service:          Physical Exam    BP (!) 149/91 (BP Location: Right arm, Patient Position: Sitting, Cuff Size: Adult Regular)   Pulse 104   Resp 16   Ht 1.6 m (5' 3\")   Wt 54.9 kg (121 lb)   SpO2 99%   BMI 21.43 kg/m        General: alert, awake, not in acute distress  HEENT: Head: Normal, normocephalic, atraumatic.  Eye: Normal external eye, conjunctiva, lids cornea, RAVINDER.  Nose: Normal external nose, mucus membranes and septum.  Pharynx: Normal buccal mucosa. Normal pharynx.  Neck / Thyroid: Supple, no masses, nodes, nodules or enlargement.  Lymphatics: No abnormally enlarged lymph nodes.  Extremities: normal strength, tone, and muscle mass  Skin: normal. no rash or abnormalities  CNS: non focal.    Lab Results    Recent Results (from the past 168 hour(s))   CBC with platelets   Result Value Ref Range    WBC Count 5.5 4.0 - 11.0 10e3/uL    RBC Count 3.46 (L) 3.80 - 5.20 10e6/uL    Hemoglobin 13.3 11.7 - 15.7 g/dL    Hematocrit 40.1 35.0 - 47.0 %     (H) 78 - 100 fL    MCH 38.4 (H) 26.5 - 33.0 pg    MCHC 33.2 31.5 - 36.5 g/dL    RDW 13.2 10.0 - 15.0 %    Platelet Count 241 150 - 450 10e3/uL   Comprehensive metabolic panel   Result Value Ref Range    Sodium 142 136 - 145 mmol/L    Potassium 4.0 3.5 - 5.0 mmol/L    Chloride 105 98 - 107 mmol/L    Carbon Dioxide (CO2) 24 22 - 31 mmol/L    Anion Gap 13 5 - 18 mmol/L    Urea Nitrogen 13 8 - 28 mg/dL    Creatinine 0.80 0.60 - 1.10 mg/dL    Calcium 9.2 8.5 - 10.5 mg/dL    Glucose 89 70 - 125 mg/dL    Alkaline Phosphatase 60 45 - 120 U/L    AST 22 0 - 40 U/L    ALT 15 0 - 45 U/L    Protein Total 6.7 6.0 - " 8.0 g/dL    Albumin 4.1 3.5 - 5.0 g/dL    Bilirubin Total 0.4 0.0 - 1.0 mg/dL    GFR Estimate 75 >60 mL/min/1.73m2       Imaging    No results found.    I spent 30 minutes on the date of service and >50% was spent on coordination and counseling of care.    Signed by: Deirdre Diaz MD

## 2022-04-05 ENCOUNTER — ONCOLOGY VISIT (OUTPATIENT)
Dept: ONCOLOGY | Facility: CLINIC | Age: 82
End: 2022-04-05
Attending: INTERNAL MEDICINE
Payer: COMMERCIAL

## 2022-04-05 ENCOUNTER — LAB (OUTPATIENT)
Dept: INFUSION THERAPY | Facility: CLINIC | Age: 82
End: 2022-04-05
Attending: INTERNAL MEDICINE
Payer: COMMERCIAL

## 2022-04-05 VITALS
SYSTOLIC BLOOD PRESSURE: 151 MMHG | DIASTOLIC BLOOD PRESSURE: 85 MMHG | OXYGEN SATURATION: 98 % | BODY MASS INDEX: 21.53 KG/M2 | WEIGHT: 121.5 LBS | HEART RATE: 82 BPM | HEIGHT: 63 IN | RESPIRATION RATE: 16 BRPM

## 2022-04-05 DIAGNOSIS — Z79.899 HIGH RISK MEDICATION USE: ICD-10-CM

## 2022-04-05 DIAGNOSIS — D45 POLYCYTHEMIA VERA (H): ICD-10-CM

## 2022-04-05 DIAGNOSIS — D45 POLYCYTHEMIA VERA (H): Primary | ICD-10-CM

## 2022-04-05 LAB
ALBUMIN SERPL-MCNC: 4 G/DL (ref 3.5–5)
ALP SERPL-CCNC: 71 U/L (ref 45–120)
ALT SERPL W P-5'-P-CCNC: 13 U/L (ref 0–45)
ANION GAP SERPL CALCULATED.3IONS-SCNC: 12 MMOL/L (ref 5–18)
AST SERPL W P-5'-P-CCNC: 19 U/L (ref 0–40)
BASOPHILS # BLD AUTO: 0 10E3/UL (ref 0–0.2)
BASOPHILS NFR BLD AUTO: 1 %
BILIRUB SERPL-MCNC: 0.5 MG/DL (ref 0–1)
BUN SERPL-MCNC: 14 MG/DL (ref 8–28)
CALCIUM SERPL-MCNC: 9.4 MG/DL (ref 8.5–10.5)
CHLORIDE BLD-SCNC: 108 MMOL/L (ref 98–107)
CO2 SERPL-SCNC: 25 MMOL/L (ref 22–31)
CREAT SERPL-MCNC: 0.77 MG/DL (ref 0.6–1.1)
EOSINOPHIL # BLD AUTO: 0 10E3/UL (ref 0–0.7)
EOSINOPHIL NFR BLD AUTO: 1 %
ERYTHROCYTE [DISTWIDTH] IN BLOOD BY AUTOMATED COUNT: 12.9 % (ref 10–15)
GFR SERPL CREATININE-BSD FRML MDRD: 78 ML/MIN/1.73M2
GLUCOSE BLD-MCNC: 97 MG/DL (ref 70–125)
HCT VFR BLD AUTO: 45.8 % (ref 35–47)
HGB BLD-MCNC: 15.1 G/DL (ref 11.7–15.7)
IMM GRANULOCYTES # BLD: 0 10E3/UL
IMM GRANULOCYTES NFR BLD: 0 %
LYMPHOCYTES # BLD AUTO: 1.3 10E3/UL (ref 0.8–5.3)
LYMPHOCYTES NFR BLD AUTO: 25 %
MCH RBC QN AUTO: 36.6 PG (ref 26.5–33)
MCHC RBC AUTO-ENTMCNC: 33 G/DL (ref 31.5–36.5)
MCV RBC AUTO: 111 FL (ref 78–100)
MONOCYTES # BLD AUTO: 0.5 10E3/UL (ref 0–1.3)
MONOCYTES NFR BLD AUTO: 11 %
NEUTROPHILS # BLD AUTO: 3.1 10E3/UL (ref 1.6–8.3)
NEUTROPHILS NFR BLD AUTO: 62 %
NRBC # BLD AUTO: 0 10E3/UL
NRBC BLD AUTO-RTO: 0 /100
PLATELET # BLD AUTO: 230 10E3/UL (ref 150–450)
POTASSIUM BLD-SCNC: 4.2 MMOL/L (ref 3.5–5)
PROT SERPL-MCNC: 6.6 G/DL (ref 6–8)
RBC # BLD AUTO: 4.13 10E6/UL (ref 3.8–5.2)
SODIUM SERPL-SCNC: 145 MMOL/L (ref 136–145)
WBC # BLD AUTO: 5 10E3/UL (ref 4–11)

## 2022-04-05 PROCEDURE — 36415 COLL VENOUS BLD VENIPUNCTURE: CPT

## 2022-04-05 PROCEDURE — 99214 OFFICE O/P EST MOD 30 MIN: CPT | Performed by: INTERNAL MEDICINE

## 2022-04-05 PROCEDURE — 80053 COMPREHEN METABOLIC PANEL: CPT

## 2022-04-05 PROCEDURE — G0463 HOSPITAL OUTPT CLINIC VISIT: HCPCS

## 2022-04-05 PROCEDURE — 85025 COMPLETE CBC W/AUTO DIFF WBC: CPT

## 2022-04-05 ASSESSMENT — PAIN SCALES - GENERAL: PAINLEVEL: NO PAIN (0)

## 2022-04-05 NOTE — PROGRESS NOTES
[facility] Hematology and Oncology Progress Note    Patient: Livier Bowers  MRN: [id number]  Date of Service: Apr 5, 2022         Reason for Visit    Chief Complaint   Patient presents with     Hematology       Assessment and Plan    Cancer Staging  No matching staging information was found for the patient.    ECOG Performance    0 - Independent     Pain  Pain Score: No Pain (0)    #. Polycythemia. JAK2 and BCR-ABL negative   Clinically well.  Unremarkable exam.  Labs were reviewed.  Hemoglobin and hematocrit at target level (hematocrit around 42-45).  CMP is normal.  Recommended to continue hydroxyurea at current dose of 500 mg 5 days a week/1000 mg 2 days a week (Saturday/Sunday)    Continue aspirin 81 mg daily.    RTC labs/provider in 3 months.     Encounter Diagnoses:    Problem List Items Addressed This Visit        Oncology Diagnoses    Polycythemia vera (H) - Primary    Relevant Orders    CBC with Platelets & Differential    Comprehensive metabolic panel      Other Visit Diagnoses     High risk medication use        Relevant Orders    CBC with Platelets & Differential    Comprehensive metabolic panel           CC: Va Patten MD   ______________________________________________________________________________    History of Present Illness    Ms. Livier Bowers is here for follow up.     She is doing well.  No new symptoms to report.  She takes hydroxyurea regularly as directed.  No side effects.    Review of systems  Apart from describing in HPI, the remainder of comprehensive ROS was negative.    Past History    Past Medical History:   Diagnosis Date     Osteoporosis      Parathyroid adenoma      Polycythemia vera (H)      Vitamin D deficiency        Past Surgical History:   Procedure Laterality Date     CATARACT EXTRACTION Left      DILATION AND CURETTAGE       KNEE SURGERY      torn meniscus     THYROIDECTOMY, PARTIAL N/A 12/2/2014    Procedure: RIGHT PARATHYROID EXCISION ;  Surgeon: Tressa  "Anita Louis MD;  Location: [facility];  Service:          Physical Exam    BP (!) 151/85   Pulse 82   Resp 16   Ht 1.6 m (5' 3\")   Wt 55.1 kg (121 lb 8 oz)   SpO2 98%   BMI 21.52 kg/m      General: alert, awake, not in acute distress  HEENT: Head: Normal, normocephalic, atraumatic.  Eye: Normal external eye, conjunctiva, lids cornea, RAVINDER.  Nose: Normal external nose, mucus membranes and septum.  Pharynx: Normal buccal mucosa. Normal pharynx.  Neck / Thyroid: Supple, no masses, nodes, nodules or enlargement.  Extremities: normal strength, tone, and muscle mass  Skin: normal. no rash or abnormalities  CNS: non focal.      Lab Results    Recent Results (from the past 168 hour(s))   Comprehensive metabolic panel   Result Value Ref Range    Sodium 145 136 - 145 mmol/L    Potassium 4.2 3.5 - 5.0 mmol/L    Chloride 108 (H) 98 - 107 mmol/L    Carbon Dioxide (CO2) 25 22 - 31 mmol/L    Anion Gap 12 5 - 18 mmol/L    Urea Nitrogen 14 8 - 28 mg/dL    Creatinine 0.77 0.60 - 1.10 mg/dL    Calcium 9.4 8.5 - 10.5 mg/dL    Glucose 97 70 - 125 mg/dL    Alkaline Phosphatase 71 45 - 120 U/L    AST 19 0 - 40 U/L    ALT 13 0 - 45 U/L    Protein Total 6.6 6.0 - 8.0 g/dL    Albumin 4.0 3.5 - 5.0 g/dL    Bilirubin Total 0.5 0.0 - 1.0 mg/dL    GFR Estimate 78 >60 mL/min/1.73m2   CBC with platelets and differential   Result Value Ref Range    WBC Count 5.0 4.0 - 11.0 10e3/uL    RBC Count 4.13 3.80 - 5.20 10e6/uL    Hemoglobin 15.1 11.7 - 15.7 g/dL    Hematocrit 45.8 35.0 - 47.0 %     (H) 78 - 100 fL    MCH 36.6 (H) 26.5 - 33.0 pg    MCHC 33.0 31.5 - 36.5 g/dL    RDW 12.9 10.0 - 15.0 %    Platelet Count 230 150 - 450 10e3/uL    % Neutrophils 62 %    % Lymphocytes 25 %    % Monocytes 11 %    % Eosinophils 1 %    % Basophils 1 %    % Immature Granulocytes 0 %    NRBCs per 100 WBC 0 <1 /100    Absolute Neutrophils 3.1 1.6 - 8.3 10e3/uL    Absolute Lymphocytes 1.3 0.8 - 5.3 10e3/uL    Absolute Monocytes 0.5 0.0 - 1.3 " 10e3/uL    Absolute Eosinophils 0.0 0.0 - 0.7 10e3/uL    Absolute Basophils 0.0 0.0 - 0.2 10e3/uL    Absolute Immature Granulocytes 0.0 <=0.4 10e3/uL    Absolute NRBCs 0.0 10e3/uL       Imaging    No results found.      Signed by: Deirdre Diaz MD

## 2022-04-05 NOTE — LETTER
4/5/2022         RE: Livier Bowers  [address] [facility] [facility] [address]        Dear Colleague,    Thank you for referring your patient, Livier Bowers, to the [facility] [place]. Please see a copy of my visit note below.    [facility] Hematology and Oncology Progress Note    Patient: Livier Bowers  MRN: [id number]  Date of Service: Apr 5, 2022         Reason for Visit    Chief Complaint   Patient presents with     Hematology       Assessment and Plan    Cancer Staging  No matching staging information was found for the patient.    ECOG Performance    0 - Independent     Pain  Pain Score: No Pain (0)    #. Polycythemia. JAK2 and BCR-ABL negative   Clinically well.  Unremarkable exam.  Labs were reviewed.  Hemoglobin and hematocrit at target level (hematocrit around 42-45).  CMP is normal.  Recommended to continue hydroxyurea at current dose of 500 mg 5 days a week/1000 mg 2 days a week (Saturday/Sunday)    Continue aspirin 81 mg daily.    RTC labs/provider in 3 months.     Encounter Diagnoses:    Problem List Items Addressed This Visit        Oncology Diagnoses    Polycythemia vera (H) - Primary    Relevant Orders    CBC with Platelets & Differential    Comprehensive metabolic panel      Other Visit Diagnoses     High risk medication use        Relevant Orders    CBC with Platelets & Differential    Comprehensive metabolic panel           CC: Va Patten MD   ______________________________________________________________________________    History of Present Illness    Ms. Livier Bowers is here for follow up.     She is doing well.  No new symptoms to report.  She takes hydroxyurea regularly as directed.  No side effects.    Review of systems  Apart from describing in HPI, the remainder of comprehensive ROS was negative.    Past History    Past Medical History:   Diagnosis Date     Osteoporosis      Parathyroid adenoma      Polycythemia vera (H)      Vitamin D deficiency   "      Past Surgical History:   Procedure Laterality Date     CATARACT EXTRACTION Left      DILATION AND CURETTAGE       KNEE SURGERY      torn meniscus     THYROIDECTOMY, PARTIAL N/A 12/2/2014    Procedure: RIGHT PARATHYROID EXCISION ;  Surgeon: Tressa Louis MD;  Location: [facility];  Service:          Physical Exam    BP (!) 151/85   Pulse 82   Resp 16   Ht 1.6 m (5' 3\")   Wt 55.1 kg (121 lb 8 oz)   SpO2 98%   BMI 21.52 kg/m      General: alert, awake, not in acute distress  HEENT: Head: Normal, normocephalic, atraumatic.  Eye: Normal external eye, conjunctiva, lids cornea, RAVINDER.  Nose: Normal external nose, mucus membranes and septum.  Pharynx: Normal buccal mucosa. Normal pharynx.  Neck / Thyroid: Supple, no masses, nodes, nodules or enlargement.  Extremities: normal strength, tone, and muscle mass  Skin: normal. no rash or abnormalities  CNS: non focal.      Lab Results    Recent Results (from the past 168 hour(s))   Comprehensive metabolic panel   Result Value Ref Range    Sodium 145 136 - 145 mmol/L    Potassium 4.2 3.5 - 5.0 mmol/L    Chloride 108 (H) 98 - 107 mmol/L    Carbon Dioxide (CO2) 25 22 - 31 mmol/L    Anion Gap 12 5 - 18 mmol/L    Urea Nitrogen 14 8 - 28 mg/dL    Creatinine 0.77 0.60 - 1.10 mg/dL    Calcium 9.4 8.5 - 10.5 mg/dL    Glucose 97 70 - 125 mg/dL    Alkaline Phosphatase 71 45 - 120 U/L    AST 19 0 - 40 U/L    ALT 13 0 - 45 U/L    Protein Total 6.6 6.0 - 8.0 g/dL    Albumin 4.0 3.5 - 5.0 g/dL    Bilirubin Total 0.5 0.0 - 1.0 mg/dL    GFR Estimate 78 >60 mL/min/1.73m2   CBC with platelets and differential   Result Value Ref Range    WBC Count 5.0 4.0 - 11.0 10e3/uL    RBC Count 4.13 3.80 - 5.20 10e6/uL    Hemoglobin 15.1 11.7 - 15.7 g/dL    Hematocrit 45.8 35.0 - 47.0 %     (H) 78 - 100 fL    MCH 36.6 (H) 26.5 - 33.0 pg    MCHC 33.0 31.5 - 36.5 g/dL    RDW 12.9 10.0 - 15.0 %    Platelet Count 230 150 - 450 10e3/uL    % Neutrophils 62 %    % Lymphocytes " "25 %    % Monocytes 11 %    % Eosinophils 1 %    % Basophils 1 %    % Immature Granulocytes 0 %    NRBCs per 100 WBC 0 <1 /100    Absolute Neutrophils 3.1 1.6 - 8.3 10e3/uL    Absolute Lymphocytes 1.3 0.8 - 5.3 10e3/uL    Absolute Monocytes 0.5 0.0 - 1.3 10e3/uL    Absolute Eosinophils 0.0 0.0 - 0.7 10e3/uL    Absolute Basophils 0.0 0.0 - 0.2 10e3/uL    Absolute Immature Granulocytes 0.0 <=0.4 10e3/uL    Absolute NRBCs 0.0 10e3/uL       Imaging    No results found.      Signed by: Deirdre Diaz MD    Oncology Rooming Note    April 5, 2022 1:20 PM   Livier Bowers is a 81 year old female who presents for:    Chief Complaint   Patient presents with     Hematology     Initial Vitals: BP (!) 151/85   Pulse 82   Resp 16   Ht 1.6 m (5' 3\")   Wt 55.1 kg (121 lb 8 oz)   SpO2 98%   BMI 21.52 kg/m   Estimated body mass index is 21.52 kg/m  as calculated from the following:    Height as of this encounter: 1.6 m (5' 3\").    Weight as of this encounter: 55.1 kg (121 lb 8 oz). Body surface area is 1.56 meters squared.  No Pain (0) Comment: Data Unavailable   No LMP recorded.  Allergies reviewed: Yes  Medications reviewed: Yes    Medications: Medication refills not needed today.  Pharmacy name entered into [facility]: [id number] [place] [address]    Clinical concerns: labs and follow up      Kristina Murguia                Again, thank you for allowing me to participate in the care of your patient.        Sincerely,        Deirdre Diaz MD    "

## 2022-04-05 NOTE — PROGRESS NOTES
"Oncology Rooming Note    April 5, 2022 1:20 PM   Livier Bowers is a 81 year old female who presents for:    Chief Complaint   Patient presents with     Hematology     Initial Vitals: BP (!) 151/85   Pulse 82   Resp 16   Ht 1.6 m (5' 3\")   Wt 55.1 kg (121 lb 8 oz)   SpO2 98%   BMI 21.52 kg/m   Estimated body mass index is 21.52 kg/m  as calculated from the following:    Height as of this encounter: 1.6 m (5' 3\").    Weight as of this encounter: 55.1 kg (121 lb 8 oz). Body surface area is 1.56 meters squared.  No Pain (0) Comment: Data Unavailable   No LMP recorded.  Allergies reviewed: Yes  Medications reviewed: Yes    Medications: Medication refills not needed today.  Pharmacy name entered into [facility]: [id number] [place] [address]    Clinical concerns: labs and follow up      Kristina Murguia            "

## 2022-07-13 ENCOUNTER — LAB (OUTPATIENT)
Dept: INFUSION THERAPY | Facility: CLINIC | Age: 82
End: 2022-07-13
Attending: INTERNAL MEDICINE
Payer: COMMERCIAL

## 2022-07-13 ENCOUNTER — ONCOLOGY VISIT (OUTPATIENT)
Dept: ONCOLOGY | Facility: CLINIC | Age: 82
End: 2022-07-13
Attending: INTERNAL MEDICINE
Payer: COMMERCIAL

## 2022-07-13 VITALS
WEIGHT: 120.3 LBS | HEART RATE: 87 BPM | SYSTOLIC BLOOD PRESSURE: 157 MMHG | DIASTOLIC BLOOD PRESSURE: 85 MMHG | OXYGEN SATURATION: 98 % | HEIGHT: 63 IN | BODY MASS INDEX: 21.32 KG/M2

## 2022-07-13 DIAGNOSIS — Z79.899 HIGH RISK MEDICATION USE: ICD-10-CM

## 2022-07-13 DIAGNOSIS — D45 POLYCYTHEMIA VERA (H): ICD-10-CM

## 2022-07-13 DIAGNOSIS — D45 POLYCYTHEMIA VERA (H): Primary | ICD-10-CM

## 2022-07-13 LAB
ALBUMIN SERPL-MCNC: 3.9 G/DL (ref 3.5–5)
ALP SERPL-CCNC: 63 U/L (ref 45–120)
ALT SERPL W P-5'-P-CCNC: 12 U/L (ref 0–45)
ANION GAP SERPL CALCULATED.3IONS-SCNC: 10 MMOL/L (ref 5–18)
AST SERPL W P-5'-P-CCNC: 19 U/L (ref 0–40)
BASOPHILS # BLD AUTO: 0.1 10E3/UL (ref 0–0.2)
BASOPHILS NFR BLD AUTO: 1 %
BILIRUB SERPL-MCNC: 0.5 MG/DL (ref 0–1)
BUN SERPL-MCNC: 15 MG/DL (ref 8–28)
CALCIUM SERPL-MCNC: 9.5 MG/DL (ref 8.5–10.5)
CHLORIDE BLD-SCNC: 107 MMOL/L (ref 98–107)
CO2 SERPL-SCNC: 25 MMOL/L (ref 22–31)
CREAT SERPL-MCNC: 0.79 MG/DL (ref 0.6–1.1)
EOSINOPHIL # BLD AUTO: 0.1 10E3/UL (ref 0–0.7)
EOSINOPHIL NFR BLD AUTO: 2 %
ERYTHROCYTE [DISTWIDTH] IN BLOOD BY AUTOMATED COUNT: 13 % (ref 10–15)
GFR SERPL CREATININE-BSD FRML MDRD: 76 ML/MIN/1.73M2
GLUCOSE BLD-MCNC: 105 MG/DL (ref 70–125)
HCT VFR BLD AUTO: 44 % (ref 35–47)
HGB BLD-MCNC: 14.8 G/DL (ref 11.7–15.7)
IMM GRANULOCYTES # BLD: 0 10E3/UL
IMM GRANULOCYTES NFR BLD: 0 %
LYMPHOCYTES # BLD AUTO: 1.5 10E3/UL (ref 0.8–5.3)
LYMPHOCYTES NFR BLD AUTO: 25 %
MCH RBC QN AUTO: 37 PG (ref 26.5–33)
MCHC RBC AUTO-ENTMCNC: 33.6 G/DL (ref 31.5–36.5)
MCV RBC AUTO: 110 FL (ref 78–100)
MONOCYTES # BLD AUTO: 0.4 10E3/UL (ref 0–1.3)
MONOCYTES NFR BLD AUTO: 8 %
NEUTROPHILS # BLD AUTO: 3.8 10E3/UL (ref 1.6–8.3)
NEUTROPHILS NFR BLD AUTO: 64 %
NRBC # BLD AUTO: 0 10E3/UL
NRBC BLD AUTO-RTO: 0 /100
PLATELET # BLD AUTO: 269 10E3/UL (ref 150–450)
POTASSIUM BLD-SCNC: 4.2 MMOL/L (ref 3.5–5)
PROT SERPL-MCNC: 6.7 G/DL (ref 6–8)
RBC # BLD AUTO: 4 10E6/UL (ref 3.8–5.2)
SODIUM SERPL-SCNC: 142 MMOL/L (ref 136–145)
WBC # BLD AUTO: 5.9 10E3/UL (ref 4–11)

## 2022-07-13 PROCEDURE — 36415 COLL VENOUS BLD VENIPUNCTURE: CPT

## 2022-07-13 PROCEDURE — 99214 OFFICE O/P EST MOD 30 MIN: CPT | Performed by: NURSE PRACTITIONER

## 2022-07-13 PROCEDURE — 80053 COMPREHEN METABOLIC PANEL: CPT

## 2022-07-13 PROCEDURE — G0463 HOSPITAL OUTPT CLINIC VISIT: HCPCS

## 2022-07-13 PROCEDURE — 85025 COMPLETE CBC W/AUTO DIFF WBC: CPT

## 2022-07-13 ASSESSMENT — PAIN SCALES - GENERAL: PAINLEVEL: NO PAIN (0)

## 2022-07-13 NOTE — LETTER
"    7/13/2022         RE: Livier Bowers  [address] [facility] [facility] [address]        Dear Colleague,    Thank you for referring your patient, Livier Bowers, to the [facility]. Please see a copy of my visit note below.    Oncology Rooming Note    July 13, 2022 1:43 PM   Livier Bowers is a 81 year old female who presents for:    Chief Complaint   Patient presents with     Oncology Clinic Visit     Adenoma Of The Parathyroid Gland     Initial Vitals: BP (!) 157/85 (BP Location: Right arm, Patient Position: Sitting, Cuff Size: Adult Regular)   Pulse 87   Ht 1.6 m (5' 3\")   Wt 54.6 kg (120 lb 4.8 oz)   SpO2 98%   BMI 21.31 kg/m   Estimated body mass index is 21.31 kg/m  as calculated from the following:    Height as of this encounter: 1.6 m (5' 3\").    Weight as of this encounter: 54.6 kg (120 lb 4.8 oz). Body surface area is 1.56 meters squared.  No Pain (0) Comment: Data Unavailable   No LMP recorded.  Allergies reviewed: Yes  Medications reviewed: Yes    Medications: Medication refills not needed today.  Pharmacy name entered into [employer]: [facility] [id number] [place] [address]    3 month follow up      Carey Beverly MA              [facility] Hematology and Oncology Progress Note    Patient: Livier Bowers  MRN: [id number]  Date of Service: Jul 13, 2022         Reason for Visit:    Scheduled follow up    Assessment and Plan:    1.           Polycythemia, JAK2 and BCR-ABL negative:     81 year old     Suma presents alone.  She looks good and states that she's been feeling good.  She's tolerating current dosing of Hydrea well and without appreciable side effects.  No signs/symptoms of infection.  Suma is diligent with taking the Hydrea 500 mg/day Monday through Friday, however admits to forgetting more than taking the 2nd 500 mg Hydrea for the prescribed 1000 mg dosing on Saturday and Sunday.  Her appetite, weight and performance status are very " stable.  She's adhering to covid restrictions and precautions.  She denies pain, cough, fever, chills, unusual headaches, visual or mentation disturbance, bowel or bladder issues, rash.      CMP - WNL.    CBC- WBC, differential, PLT and HgB WNL.  Hematocrit @ 44%.  MCV elevated r/t Hydrea.    I reviewed Livier's labs with her.  Her hematocrit is within our goal of 42-45 on current dosing of Hydrea.    Livier is tolerating current Hydrea dosing without incidence.     Continue Hydrea 500 mg/day Monday through Friday with 1000 mg on Saturday and Sunday.    Continue daily aspirin therapy.  She can only tolerate baby aspirin as RS aspirin causes her stomach to feel poorly.  Instructed to take ASA with a meal.     Suma has opted against receiving the SARS-covid vaccine.    4-month f/up with HEME 1 and CMP.      Hematologic History:     1.           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 Serum EPO was low normal.   ? Polycythemia initially appeared secondary, likely to COPD.  ? Phlebotomy was recommended, but she declined.   ? Sleep study was unremarkable.      Hydroxyurea was initiated at 500 mg daily; however, ineffective and therefore increased to 500 mg twice daily.   ? She then cut back on her smoking and self manipulated the dose of Hydrea to take the least amount possible.   ? 2013, August - she quit smoking completely, switching to the E cigarette.  Hydrea was further decreased.  ? 2013, December - she was down to Hydrea 500 mg every other day and she opted to discontinue it.    2014, July - hematocrit elevated at 50.7 and hemoglobin 16.8.  She would only agree to restarting the Hydrea at 500 mg every other day.   ? Four week follow up in mid-August, 2014, found her Hct responding to low dose Hydrea. Wishing to be on the lowest dose possible of Hydrea she wanted to try every 3rd day Hydrea, 500 mg, as she was able to be off Hydrea therapy for approximately 7 months  before her hemoglobin and hematocrit lorenzo to the lever of being concerning and therapy resumed.     ? 2014, September follow up found the Hct and HgB on the rise. She agreed to return to every other day dosing with Hydrea, 500 mg.    2015, January 19 - agreed to try phlebotomy and Hydrea was discontinued.   ? Prior phlebotomies:    2020 - 03/09, 01/07.    2019 - 01/19, 03/20, 06/12, 09/10, 10/08, 11/05.    2018 - 02/08, 04/25, 06/26, 08/29, 11/06.    2017 - 01/06, 02/01, 03/08, 04/14, 09/21, 11/02, 12/14.    2016 - 10/28, 10/07, 09/21, 05/24, 02/05, 01/22.    2015 - 10/22, 07/22, 05/29, 05/18, 04/06, 03/09, 01/28.    03/09/20 - With onset of iron deficiency, stopped phlebotomies.  Not anemic.  Resumed Hydrea, 500 mg every other day.   ? 03/16/20 Erythropoietin - WNL @ 11.    If EPO low - BM bx.  If EPO high - likely r/t her COPD.    03/23/20 Jak2 exon12, MPL and ADI testing - no mutations identified.     04/20/2020 - Hydrea increased to 500 mg daily (HcT @ 46.8).     12/22/2020 - Hydrea increased to 500 mg Monday - Saturday with 1000 mg on Sunday (HcT @ 45.5).     08/26/2021 - Hydrea increased to 500 mg Monday through Friday with 1000 mg Saturday and Saturday (hematocrit 46.3).     ECOG Performance:    0 - Independent    Pain:    Pain Score: No Pain (0)    Encounter Diagnoses:    Problem List Items Addressed This Visit        Hematologic    Polycythemia vera (H) - Primary      Other Visit Diagnoses     High risk medication use                 31 minutes spent on the date of the encounter doing chart review, history and exam, documentation and further activities as noted above.    Luke Guerrero, SURINDER     CC: Va Patten MD   ______________________________________________________________________________    Review of Systems:    No fever or night sweats.  No loss of weight.  No lumps or bumps anywhere.  No unusual headaches or eyesight issues.  No dizziness.  No bleeding from the nose.  No sores in the mouth. No  "problems with swallowing.  No chest pain. No shortness of breath. No cough.  No abdominal pain. No nausea or vomiting.  No diarrhea or constipation.  No blood in stool or black colored stools.  No problems passing urine.  No numbness or tingling in hands or feet.  No skin rashes.    A 14 point review of systems is otherwise negative.    Past History:    Past Medical History:   Diagnosis Date     Osteoporosis      Parathyroid adenoma      Polycythemia vera (H)      Vitamin D deficiency        Past Surgical History:   Procedure Laterality Date     CATARACT EXTRACTION Left      DILATION AND CURETTAGE       KNEE SURGERY      torn meniscus     THYROIDECTOMY, PARTIAL N/A 12/2/2014    Procedure: RIGHT PARATHYROID EXCISION ;  Surgeon: Tressa Louis MD;  Location: [facility];  Service:      Physical Exam:    BP (!) 157/85 (BP Location: Right arm, Patient Position: Sitting, Cuff Size: Adult Regular)   Pulse 87   Ht 1.6 m (5' 3\")   Wt 54.6 kg (120 lb 4.8 oz)   SpO2 98%   BMI 21.31 kg/m      GENERAL:   Pleasant.  Alert and oriented. Seated comfortably. In no distress.    HEENT:   Atraumatic and normocephalic.  RAVINDER.  EOMI.  No pallor.  No icterus.  No mucosal lesions.    LYMPH NODES:  No palpable cervical, axillary or inguinal lymphadenopathy.    CHEST:   Lungs clear to auscultation bilaterally.    CVS:    S1 and S2 are heard. Regular rate and rhythm.  No murmur, gallop or rub heard.  No peripheral edema.    EXTREMITIES:  Warm.    SKIN:    No rash, bruising or purpura noted.  Full head of hair.    Lab Results:    Reviewed with patient.    Recent Results (from the past 168 hour(s))   Comprehensive metabolic panel   Result Value Ref Range    Sodium 142 136 - 145 mmol/L    Potassium 4.2 3.5 - 5.0 mmol/L    Chloride 107 98 - 107 mmol/L    Carbon Dioxide (CO2) 25 22 - 31 mmol/L    Anion Gap 10 5 - 18 mmol/L    Urea Nitrogen 15 8 - 28 mg/dL    Creatinine 0.79 0.60 - 1.10 mg/dL    Calcium 9.5 8.5 - 10.5 " mg/dL    Glucose 105 70 - 125 mg/dL    Alkaline Phosphatase 63 45 - 120 U/L    AST 19 0 - 40 U/L    ALT 12 0 - 45 U/L    Protein Total 6.7 6.0 - 8.0 g/dL    Albumin 3.9 3.5 - 5.0 g/dL    Bilirubin Total 0.5 0.0 - 1.0 mg/dL    GFR Estimate 76 >60 mL/min/1.73m2   CBC with platelets and differential   Result Value Ref Range    WBC Count 5.9 4.0 - 11.0 10e3/uL    RBC Count 4.00 3.80 - 5.20 10e6/uL    Hemoglobin 14.8 11.7 - 15.7 g/dL    Hematocrit 44.0 35.0 - 47.0 %     (H) 78 - 100 fL    MCH 37.0 (H) 26.5 - 33.0 pg    MCHC 33.6 31.5 - 36.5 g/dL    RDW 13.0 10.0 - 15.0 %    Platelet Count 269 150 - 450 10e3/uL    % Neutrophils 64 %    % Lymphocytes 25 %    % Monocytes 8 %    % Eosinophils 2 %    % Basophils 1 %    % Immature Granulocytes 0 %    NRBCs per 100 WBC 0 <1 /100    Absolute Neutrophils 3.8 1.6 - 8.3 10e3/uL    Absolute Lymphocytes 1.5 0.8 - 5.3 10e3/uL    Absolute Monocytes 0.4 0.0 - 1.3 10e3/uL    Absolute Eosinophils 0.1 0.0 - 0.7 10e3/uL    Absolute Basophils 0.1 0.0 - 0.2 10e3/uL    Absolute Immature Granulocytes 0.0 <=0.4 10e3/uL    Absolute NRBCs 0.0 10e3/uL     Imaging:    No results found.        Again, thank you for allowing me to participate in the care of your patient.        Sincerely,        Luke Guerrero, CNP

## 2022-07-13 NOTE — PROGRESS NOTES
"Oncology Rooming Note    July 13, 2022 1:43 PM   Livier Bowers is a 81 year old female who presents for:    Chief Complaint   Patient presents with     Oncology Clinic Visit     Adenoma Of The Parathyroid Gland     Initial Vitals: BP (!) 157/85 (BP Location: Right arm, Patient Position: Sitting, Cuff Size: Adult Regular)   Pulse 87   Ht 1.6 m (5' 3\")   Wt 54.6 kg (120 lb 4.8 oz)   SpO2 98%   BMI 21.31 kg/m   Estimated body mass index is 21.31 kg/m  as calculated from the following:    Height as of this encounter: 1.6 m (5' 3\").    Weight as of this encounter: 54.6 kg (120 lb 4.8 oz). Body surface area is 1.56 meters squared.  No Pain (0) Comment: Data Unavailable   No LMP recorded.  Allergies reviewed: Yes  Medications reviewed: Yes    Medications: Medication refills not needed today.  Pharmacy name entered into [facility] [id number] [place] [address]    3 month follow up      Carey Beverly MA            "

## 2022-07-13 NOTE — PATIENT INSTRUCTIONS
Recent Results (from the past 24 hour(s))   Comprehensive metabolic panel    Collection Time: 07/13/22  1:30 PM   Result Value Ref Range    Sodium 142 136 - 145 mmol/L    Potassium 4.2 3.5 - 5.0 mmol/L    Chloride 107 98 - 107 mmol/L    Carbon Dioxide (CO2) 25 22 - 31 mmol/L    Anion Gap 10 5 - 18 mmol/L    Urea Nitrogen 15 8 - 28 mg/dL    Creatinine 0.79 0.60 - 1.10 mg/dL    Calcium 9.5 8.5 - 10.5 mg/dL    Glucose 105 70 - 125 mg/dL    Alkaline Phosphatase 63 45 - 120 U/L    AST 19 0 - 40 U/L    ALT 12 0 - 45 U/L    Protein Total 6.7 6.0 - 8.0 g/dL    Albumin 3.9 3.5 - 5.0 g/dL    Bilirubin Total 0.5 0.0 - 1.0 mg/dL    GFR Estimate 76 >60 mL/min/1.73m2   CBC with platelets and differential    Collection Time: 07/13/22  1:30 PM   Result Value Ref Range    WBC Count 5.9 4.0 - 11.0 10e3/uL    RBC Count 4.00 3.80 - 5.20 10e6/uL    Hemoglobin 14.8 11.7 - 15.7 g/dL    Hematocrit 44.0 35.0 - 47.0 %     (H) 78 - 100 fL    MCH 37.0 (H) 26.5 - 33.0 pg    MCHC 33.6 31.5 - 36.5 g/dL    RDW 13.0 10.0 - 15.0 %    Platelet Count 269 150 - 450 10e3/uL    % Neutrophils 64 %    % Lymphocytes 25 %    % Monocytes 8 %    % Eosinophils 2 %    % Basophils 1 %    % Immature Granulocytes 0 %    NRBCs per 100 WBC 0 <1 /100    Absolute Neutrophils 3.8 1.6 - 8.3 10e3/uL    Absolute Lymphocytes 1.5 0.8 - 5.3 10e3/uL    Absolute Monocytes 0.4 0.0 - 1.3 10e3/uL    Absolute Eosinophils 0.1 0.0 - 0.7 10e3/uL    Absolute Basophils 0.1 0.0 - 0.2 10e3/uL    Absolute Immature Granulocytes 0.0 <=0.4 10e3/uL    Absolute NRBCs 0.0 10e3/uL

## 2022-07-13 NOTE — PROGRESS NOTES
[facility] Hematology and Oncology Progress Note    Patient: Livier Bowers  MRN: [id number]  Date of Service: Jul 13, 2022         Reason for Visit:    Scheduled follow up    Assessment and Plan:    1.           Polycythemia, JAK2 and BCR-ABL negative:     81 year old     Suma presents alone.  She looks good and states that she's been feeling good.  She's tolerating current dosing of Hydrea well and without appreciable side effects.  No signs/symptoms of infection.  Suma is diligent with taking the Hydrea 500 mg/day Monday through Friday, however admits to forgetting more than taking the 2nd 500 mg Hydrea for the prescribed 1000 mg dosing on Saturday and Sunday.  Her appetite, weight and performance status are very stable.  She's adhering to covid restrictions and precautions.  She denies pain, cough, fever, chills, unusual headaches, visual or mentation disturbance, bowel or bladder issues, rash.      CMP - WNL.    CBC- WBC, differential, PLT and HgB WNL.  Hematocrit @ 44%.  MCV elevated r/t Hydrea.    I reviewed Livier's labs with her.  Her hematocrit is within our goal of 42-45 on current dosing of Hydrea.    Livier is tolerating current Hydrea dosing without incidence.     Continue Hydrea 500 mg/day Monday through Friday with 1000 mg on Saturday and Sunday.    Continue daily aspirin therapy.  She can only tolerate baby aspirin as RS aspirin causes her stomach to feel poorly.  Instructed to take ASA with a meal.     Suma has opted against receiving the SARS-covid vaccine.    4-month f/up with HEME 1 and CMP.      Hematologic History:     1.           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 Serum EPO was low normal.   ? Polycythemia initially appeared secondary, likely to COPD.  ? Phlebotomy was recommended, but she declined.   ? Sleep study was unremarkable.      Hydroxyurea was initiated at 500 mg daily; however, ineffective and therefore increased to  500 mg twice daily.   ? She then cut back on her smoking and self manipulated the dose of Hydrea to take the least amount possible.   ? 2013, August - she quit smoking completely, switching to the E cigarette.  Hydrea was further decreased.  ? 2013, December - she was down to Hydrea 500 mg every other day and she opted to discontinue it.    2014, July - hematocrit elevated at 50.7 and hemoglobin 16.8.  She would only agree to restarting the Hydrea at 500 mg every other day.   ? Four week follow up in mid-August, 2014, found her Hct responding to low dose Hydrea. Wishing to be on the lowest dose possible of Hydrea she wanted to try every 3rd day Hydrea, 500 mg, as she was able to be off Hydrea therapy for approximately 7 months before her hemoglobin and hematocrit lorenzo to the lever of being concerning and therapy resumed.     ? 2014, September follow up found the Hct and HgB on the rise. She agreed to return to every other day dosing with Hydrea, 500 mg.    2015, January 19 - agreed to try phlebotomy and Hydrea was discontinued.   ? Prior phlebotomies:    2020 - 03/09, 01/07.    2019 - 01/19, 03/20, 06/12, 09/10, 10/08, 11/05.    2018 - 02/08, 04/25, 06/26, 08/29, 11/06.    2017 - 01/06, 02/01, 03/08, 04/14, 09/21, 11/02, 12/14.    2016 - 10/28, 10/07, 09/21, 05/24, 02/05, 01/22.    2015 - 10/22, 07/22, 05/29, 05/18, 04/06, 03/09, 01/28.    03/09/20 - With onset of iron deficiency, stopped phlebotomies.  Not anemic.  Resumed Hydrea, 500 mg every other day.   ? 03/16/20 Erythropoietin - WNL @ 11.    If EPO low - BM bx.  If EPO high - likely r/t her COPD.    03/23/20 Jak2 exon12, MPL and ADI testing - no mutations identified.     04/20/2020 - Hydrea increased to 500 mg daily (HcT @ 46.8).     12/22/2020 - Hydrea increased to 500 mg Monday - Saturday with 1000 mg on Sunday (HcT @ 45.5).     08/26/2021 - Hydrea increased to 500 mg Monday through Friday with 1000 mg Saturday and Saturday (hematocrit 46.3).     ECOG  "Performance:    0 - Independent    Pain:    Pain Score: No Pain (0)    Encounter Diagnoses:    Problem List Items Addressed This Visit        Hematologic    Polycythemia vera (H) - Primary      Other Visit Diagnoses     High risk medication use                 31 minutes spent on the date of the encounter doing chart review, history and exam, documentation and further activities as noted above.    Luek Guerrero, CNP     CC: Va Patten MD   ______________________________________________________________________________    Review of Systems:    No fever or night sweats.  No loss of weight.  No lumps or bumps anywhere.  No unusual headaches or eyesight issues.  No dizziness.  No bleeding from the nose.  No sores in the mouth. No problems with swallowing.  No chest pain. No shortness of breath. No cough.  No abdominal pain. No nausea or vomiting.  No diarrhea or constipation.  No blood in stool or black colored stools.  No problems passing urine.  No numbness or tingling in hands or feet.  No skin rashes.    A 14 point review of systems is otherwise negative.    Past History:    Past Medical History:   Diagnosis Date     Osteoporosis      Parathyroid adenoma      Polycythemia vera (H)      Vitamin D deficiency        Past Surgical History:   Procedure Laterality Date     CATARACT EXTRACTION Left      DILATION AND CURETTAGE       KNEE SURGERY      torn meniscus     THYROIDECTOMY, PARTIAL N/A 12/2/2014    Procedure: RIGHT PARATHYROID EXCISION ;  Surgeon: Tressa Louis MD;  Location: [facility];  Service:      Physical Exam:    BP (!) 157/85 (BP Location: Right arm, Patient Position: Sitting, Cuff Size: Adult Regular)   Pulse 87   Ht 1.6 m (5' 3\")   Wt 54.6 kg (120 lb 4.8 oz)   SpO2 98%   BMI 21.31 kg/m      GENERAL:   Pleasant.  Alert and oriented. Seated comfortably. In no distress.    HEENT:   Atraumatic and normocephalic.  RAVINDER.  EOMI.  No pallor.  No icterus.  No mucosal lesions.    LYMPH " NODES:  No palpable cervical, axillary or inguinal lymphadenopathy.    CHEST:   Lungs clear to auscultation bilaterally.    CVS:    S1 and S2 are heard. Regular rate and rhythm.  No murmur, gallop or rub heard.  No peripheral edema.    EXTREMITIES:  Warm.    SKIN:    No rash, bruising or purpura noted.  Full head of hair.    Lab Results:    Reviewed with patient.    Recent Results (from the past 168 hour(s))   Comprehensive metabolic panel   Result Value Ref Range    Sodium 142 136 - 145 mmol/L    Potassium 4.2 3.5 - 5.0 mmol/L    Chloride 107 98 - 107 mmol/L    Carbon Dioxide (CO2) 25 22 - 31 mmol/L    Anion Gap 10 5 - 18 mmol/L    Urea Nitrogen 15 8 - 28 mg/dL    Creatinine 0.79 0.60 - 1.10 mg/dL    Calcium 9.5 8.5 - 10.5 mg/dL    Glucose 105 70 - 125 mg/dL    Alkaline Phosphatase 63 45 - 120 U/L    AST 19 0 - 40 U/L    ALT 12 0 - 45 U/L    Protein Total 6.7 6.0 - 8.0 g/dL    Albumin 3.9 3.5 - 5.0 g/dL    Bilirubin Total 0.5 0.0 - 1.0 mg/dL    GFR Estimate 76 >60 mL/min/1.73m2   CBC with platelets and differential   Result Value Ref Range    WBC Count 5.9 4.0 - 11.0 10e3/uL    RBC Count 4.00 3.80 - 5.20 10e6/uL    Hemoglobin 14.8 11.7 - 15.7 g/dL    Hematocrit 44.0 35.0 - 47.0 %     (H) 78 - 100 fL    MCH 37.0 (H) 26.5 - 33.0 pg    MCHC 33.6 31.5 - 36.5 g/dL    RDW 13.0 10.0 - 15.0 %    Platelet Count 269 150 - 450 10e3/uL    % Neutrophils 64 %    % Lymphocytes 25 %    % Monocytes 8 %    % Eosinophils 2 %    % Basophils 1 %    % Immature Granulocytes 0 %    NRBCs per 100 WBC 0 <1 /100    Absolute Neutrophils 3.8 1.6 - 8.3 10e3/uL    Absolute Lymphocytes 1.5 0.8 - 5.3 10e3/uL    Absolute Monocytes 0.4 0.0 - 1.3 10e3/uL    Absolute Eosinophils 0.1 0.0 - 0.7 10e3/uL    Absolute Basophils 0.1 0.0 - 0.2 10e3/uL    Absolute Immature Granulocytes 0.0 <=0.4 10e3/uL    Absolute NRBCs 0.0 10e3/uL     Imaging:    No results found.

## 2022-07-24 ENCOUNTER — HEALTH MAINTENANCE LETTER (OUTPATIENT)
Age: 82
End: 2022-07-24

## 2022-08-10 DIAGNOSIS — D45 POLYCYTHEMIA VERA (H): ICD-10-CM

## 2022-08-10 RX ORDER — HYDROXYUREA 500 MG/1
CAPSULE ORAL
Qty: 110 CAPSULE | Refills: 3 | Status: SHIPPED | OUTPATIENT
Start: 2022-08-10 | End: 2024-05-29

## 2022-11-21 ENCOUNTER — LAB (OUTPATIENT)
Dept: INFUSION THERAPY | Facility: CLINIC | Age: 82
End: 2022-11-21
Attending: NURSE PRACTITIONER
Payer: COMMERCIAL

## 2022-11-21 ENCOUNTER — ONCOLOGY VISIT (OUTPATIENT)
Dept: ONCOLOGY | Facility: CLINIC | Age: 82
End: 2022-11-21
Attending: NURSE PRACTITIONER
Payer: COMMERCIAL

## 2022-11-21 VITALS
HEART RATE: 89 BPM | HEIGHT: 63 IN | WEIGHT: 122.1 LBS | BODY MASS INDEX: 21.63 KG/M2 | OXYGEN SATURATION: 96 % | RESPIRATION RATE: 16 BRPM | SYSTOLIC BLOOD PRESSURE: 132 MMHG | DIASTOLIC BLOOD PRESSURE: 84 MMHG

## 2022-11-21 DIAGNOSIS — Z79.899 HIGH RISK MEDICATION USE: ICD-10-CM

## 2022-11-21 DIAGNOSIS — D45 POLYCYTHEMIA VERA (H): ICD-10-CM

## 2022-11-21 DIAGNOSIS — D45 POLYCYTHEMIA VERA (H): Primary | ICD-10-CM

## 2022-11-21 LAB
ALBUMIN SERPL-MCNC: 4 G/DL (ref 3.5–5)
ALP SERPL-CCNC: 64 U/L (ref 45–120)
ALT SERPL W P-5'-P-CCNC: 13 U/L (ref 0–45)
ANION GAP SERPL CALCULATED.3IONS-SCNC: 10 MMOL/L (ref 5–18)
AST SERPL W P-5'-P-CCNC: 19 U/L (ref 0–40)
BASOPHILS # BLD AUTO: 0 10E3/UL (ref 0–0.2)
BASOPHILS NFR BLD AUTO: 0 %
BILIRUB SERPL-MCNC: 0.7 MG/DL (ref 0–1)
BUN SERPL-MCNC: 13 MG/DL (ref 8–28)
CALCIUM SERPL-MCNC: 9.5 MG/DL (ref 8.5–10.5)
CHLORIDE BLD-SCNC: 106 MMOL/L (ref 98–107)
CO2 SERPL-SCNC: 27 MMOL/L (ref 22–31)
CREAT SERPL-MCNC: 0.84 MG/DL (ref 0.6–1.1)
EOSINOPHIL # BLD AUTO: 0.1 10E3/UL (ref 0–0.7)
EOSINOPHIL NFR BLD AUTO: 1 %
ERYTHROCYTE [DISTWIDTH] IN BLOOD BY AUTOMATED COUNT: 13.4 % (ref 10–15)
GFR SERPL CREATININE-BSD FRML MDRD: 70 ML/MIN/1.73M2
GLUCOSE BLD-MCNC: 140 MG/DL (ref 70–125)
HCT VFR BLD AUTO: 45.6 % (ref 35–47)
HGB BLD-MCNC: 14.8 G/DL (ref 11.7–15.7)
IMM GRANULOCYTES # BLD: 0 10E3/UL
IMM GRANULOCYTES NFR BLD: 0 %
LYMPHOCYTES # BLD AUTO: 1.2 10E3/UL (ref 0.8–5.3)
LYMPHOCYTES NFR BLD AUTO: 20 %
MCH RBC QN AUTO: 37.4 PG (ref 26.5–33)
MCHC RBC AUTO-ENTMCNC: 32.5 G/DL (ref 31.5–36.5)
MCV RBC AUTO: 115 FL (ref 78–100)
MONOCYTES # BLD AUTO: 0.4 10E3/UL (ref 0–1.3)
MONOCYTES NFR BLD AUTO: 7 %
NEUTROPHILS # BLD AUTO: 4.3 10E3/UL (ref 1.6–8.3)
NEUTROPHILS NFR BLD AUTO: 72 %
NRBC # BLD AUTO: 0 10E3/UL
NRBC BLD AUTO-RTO: 0 /100
PLATELET # BLD AUTO: 208 10E3/UL (ref 150–450)
POTASSIUM BLD-SCNC: 4.2 MMOL/L (ref 3.5–5)
PROT SERPL-MCNC: 6.7 G/DL (ref 6–8)
RBC # BLD AUTO: 3.96 10E6/UL (ref 3.8–5.2)
SODIUM SERPL-SCNC: 143 MMOL/L (ref 136–145)
WBC # BLD AUTO: 6 10E3/UL (ref 4–11)

## 2022-11-21 PROCEDURE — 80053 COMPREHEN METABOLIC PANEL: CPT

## 2022-11-21 PROCEDURE — 82040 ASSAY OF SERUM ALBUMIN: CPT

## 2022-11-21 PROCEDURE — 36415 COLL VENOUS BLD VENIPUNCTURE: CPT

## 2022-11-21 PROCEDURE — 85014 HEMATOCRIT: CPT

## 2022-11-21 PROCEDURE — 99213 OFFICE O/P EST LOW 20 MIN: CPT | Performed by: NURSE PRACTITIONER

## 2022-11-21 PROCEDURE — G0463 HOSPITAL OUTPT CLINIC VISIT: HCPCS

## 2022-11-21 ASSESSMENT — PAIN SCALES - GENERAL: PAINLEVEL: NO PAIN (0)

## 2022-11-21 NOTE — LETTER
"    11/21/2022         RE: Livier Bowers  [address]  [facility] [address]        Dear Colleague,    Thank you for referring your patient, Livier Bowers, to the [facility]. Please see a copy of my visit note below.    Oncology Rooming Note    November 21, 2022 1:55 PM   Livier Bowers is a 81 year old female who presents for:    Chief Complaint   Patient presents with     Oncology Clinic Visit     Polycythemia vera      Initial Vitals: /84   Pulse 89   Resp 16   Ht 1.6 m (5' 3\")   Wt 55.4 kg (122 lb 1.6 oz)   SpO2 96%   BMI 21.63 kg/m   Estimated body mass index is 21.63 kg/m  as calculated from the following:    Height as of this encounter: 1.6 m (5' 3\").    Weight as of this encounter: 55.4 kg (122 lb 1.6 oz). Body surface area is 1.57 meters squared.  No Pain (0) Comment: Data Unavailable   No LMP recorded.  Allergies reviewed: Yes  Medications reviewed: Yes    Medications: Medication refills not needed today.  Pharmacy name entered into [employer] ([email or web address]): [id number] [place] [address]    Clinical concerns:  4 month follow up      Kristina Murguia              [facility] Hematology and Oncology Progress Note    Patient: Livier Bowers  MRN: [id number]  Date of Service: Nov 21, 2022         Reason for Visit:    Scheduled follow up    Assessment and Plan:    1.           Polycythemia, JAK2 and BCR-ABL negative:     81 year old     Suma presents alone and in good spirits.  She looks good and states that she's been feeling good.  She's tolerating current dosing of Hydrea well and without appreciable side effects.  No signs/symptoms of infection.   Her appetite, weight and performance status are very stable. She denies pain, cough, fever, chills, unusual headaches, visual or mentation disturbance, bowel or bladder issues, rash.      CMP - basically WNL.    CBC- WBC, differential, PLT and HgB WNL.  Hematocrit slightly increased @ 45.6%.  MCV " elevated r/t Hydrea.    I reviewed Livier's labs with her.  Her hematocrit is just slightly above our goal of 42-45% on current dosing of Hydrea.  She has vacillated between 40 and 46% since starting this dose of Hydrea, so will hold at current dosing.    Livier is tolerating current Hydrea dosing without incidence.     Continue Hydrea 500 mg/day Monday through Friday with 1000 mg on Saturday and Sunday.    Continue daily aspirin therapy.  She can only tolerate baby aspirin as RS aspirin causes her stomach to feel poorly.  Instructed to take ASA with a meal.     Suma has opted against receiving the SARS-covid vaccine and has never had and doesn't want the seasonal flu vaccine.    4-month f/up with HEME 1 and CMP.      Hematologic History:     1.           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 Serum EPO was low normal.   ? Polycythemia initially appeared secondary, likely to COPD.  ? Phlebotomy was recommended, but she declined.   ? Sleep study was unremarkable.      Hydroxyurea was initiated at 500 mg daily; however, ineffective and therefore increased to 500 mg twice daily.   ? She then cut back on her smoking and self manipulated the dose of Hydrea to take the least amount possible.   ? 2013, August - she quit smoking completely, switching to the E cigarette.  Hydrea was further decreased.  ? 2013, December - she was down to Hydrea 500 mg every other day and she opted to discontinue it.    2014, July - hematocrit elevated at 50.7 and hemoglobin 16.8.  She would only agree to restarting the Hydrea at 500 mg every other day.   ? Four week follow up in mid-August, 2014, found her Hct responding to low dose Hydrea. Wishing to be on the lowest dose possible of Hydrea she wanted to try every 3rd day Hydrea, 500 mg, as she was able to be off Hydrea therapy for approximately 7 months before her hemoglobin and hematocrit lorenzo to the lever of being concerning and therapy  resumed.     ? 2014, September follow up found the Hct and HgB on the rise. She agreed to return to every other day dosing with Hydrea, 500 mg.    2015, January 19 - agreed to try phlebotomy and Hydrea was discontinued.   ? Prior phlebotomies:    2020 - 03/09, 01/07.    2019 - 01/19, 03/20, 06/12, 09/10, 10/08, 11/05.    2018 - 02/08, 04/25, 06/26, 08/29, 11/06.    2017 - 01/06, 02/01, 03/08, 04/14, 09/21, 11/02, 12/14.    2016 - 10/28, 10/07, 09/21, 05/24, 02/05, 01/22.    2015 - 10/22, 07/22, 05/29, 05/18, 04/06, 03/09, 01/28.    03/09/20 - With onset of iron deficiency, stopped phlebotomies.  Not anemic.  Resumed Hydrea, 500 mg every other day.   ? 03/16/20 Erythropoietin - WNL @ 11.    If EPO low - BM bx.  If EPO high - likely r/t her COPD.    03/23/20 Jak2 exon12, MPL and ADI testing - no mutations identified.     04/20/2020 - Hydrea increased to 500 mg daily (HcT @ 46.8).     12/22/2020 - Hydrea increased to 500 mg Monday - Saturday with 1000 mg on Sunday (HcT @ 45.5).     08/26/2021 - Hydrea increased to 500 mg Monday through Friday with 1000 mg Saturday and Saturday (hematocrit 46.3).     ECOG Performance:    0 - Independent    Pain:    Pain Score: No Pain (0)    Encounter Diagnoses:    Problem List Items Addressed This Visit        Hematologic    Polycythemia vera (H) - Primary    Relevant Orders    CBC with platelets and differential    Comprehensive metabolic panel   Other Visit Diagnoses     High risk medication use        Relevant Orders    CBC with platelets and differential    Comprehensive metabolic panel             28 minutes spent on the date of the encounter doing chart review, history and exam, documentation and further activities as noted above.    Luke Guerrero CNP     CC: Va Patten MD   ___________________________________________________________________    Review of Systems:    No fever or night sweats.  No loss of weight.  No lumps or bumps anywhere.  No unusual headaches or eyesight  "issues.  No dizziness.  No bleeding from the nose.  No sores in the mouth. No problems with swallowing.  No chest pain. No shortness of breath. No cough.  No abdominal pain. No nausea or vomiting.  No diarrhea or constipation.  No blood in stool or black colored stools.  No problems passing urine.  No numbness or tingling in hands or feet.  No skin rashes.    A 14 point review of systems is otherwise negative.    Past History:    Past Medical History:   Diagnosis Date     Osteoporosis      Parathyroid adenoma      Polycythemia vera (H)      Vitamin D deficiency        Past Surgical History:   Procedure Laterality Date     CATARACT EXTRACTION Left      DILATION AND CURETTAGE       KNEE SURGERY      torn meniscus     THYROIDECTOMY, PARTIAL N/A 12/2/2014    Procedure: RIGHT PARATHYROID EXCISION ;  Surgeon: Tressa Louis MD;  Location: [facility];  Service:      Physical Exam:    /84   Pulse 89   Resp 16   Ht 1.6 m (5' 3\")   Wt 55.4 kg (122 lb 1.6 oz)   SpO2 96%   BMI 21.63 kg/m      GENERAL:   Pleasant.  Alert and oriented. Seated comfortably. In no distress.    HEENT:   Atraumatic and normocephalic.  RAVINDER.  EOMI.  No pallor.  No icterus.  No mucosal lesions.    LYMPH NODES:  No palpable cervical, axillary or inguinal lymphadenopathy.    CHEST:   Lungs clear to auscultation bilaterally.    CVS:    S1 and S2 heard. Regular rate and rhythm.  No murmur, gallop or rub heard.  No peripheral edema.    EXTREMITIES:  Warm.    SKIN:    No rash, bruising or purpura noted.  Full head of hair.    Lab Results:    Reviewed with patient.    Recent Results (from the past 168 hour(s))   Comprehensive metabolic panel   Result Value Ref Range    Sodium 143 136 - 145 mmol/L    Potassium 4.2 3.5 - 5.0 mmol/L    Chloride 106 98 - 107 mmol/L    Carbon Dioxide (CO2) 27 22 - 31 mmol/L    Anion Gap 10 5 - 18 mmol/L    Urea Nitrogen 13 8 - 28 mg/dL    Creatinine 0.84 0.60 - 1.10 mg/dL    Calcium 9.5 8.5 - 10.5 " mg/dL    Glucose 140 (H) 70 - 125 mg/dL    Alkaline Phosphatase 64 45 - 120 U/L    AST 19 0 - 40 U/L    ALT 13 0 - 45 U/L    Protein Total 6.7 6.0 - 8.0 g/dL    Albumin 4.0 3.5 - 5.0 g/dL    Bilirubin Total 0.7 0.0 - 1.0 mg/dL    GFR Estimate 70 >60 mL/min/1.73m2   CBC with platelets and differential   Result Value Ref Range    WBC Count 6.0 4.0 - 11.0 10e3/uL    RBC Count 3.96 3.80 - 5.20 10e6/uL    Hemoglobin 14.8 11.7 - 15.7 g/dL    Hematocrit 45.6 35.0 - 47.0 %     (H) 78 - 100 fL    MCH 37.4 (H) 26.5 - 33.0 pg    MCHC 32.5 31.5 - 36.5 g/dL    RDW 13.4 10.0 - 15.0 %    Platelet Count 208 150 - 450 10e3/uL    % Neutrophils 72 %    % Lymphocytes 20 %    % Monocytes 7 %    % Eosinophils 1 %    % Basophils 0 %    % Immature Granulocytes 0 %    NRBCs per 100 WBC 0 <1 /100    Absolute Neutrophils 4.3 1.6 - 8.3 10e3/uL    Absolute Lymphocytes 1.2 0.8 - 5.3 10e3/uL    Absolute Monocytes 0.4 0.0 - 1.3 10e3/uL    Absolute Eosinophils 0.1 0.0 - 0.7 10e3/uL    Absolute Basophils 0.0 0.0 - 0.2 10e3/uL    Absolute Immature Granulocytes 0.0 <=0.4 10e3/uL    Absolute NRBCs 0.0 10e3/uL     Imaging:    No results found.      Again, thank you for allowing me to participate in the care of your patient.        Sincerely,        Luke Guerrero, CNP

## 2022-11-21 NOTE — PATIENT INSTRUCTIONS
Recent Results (from the past 24 hour(s))   Comprehensive metabolic panel    Collection Time: 11/21/22  1:18 PM   Result Value Ref Range    Sodium 143 136 - 145 mmol/L    Potassium 4.2 3.5 - 5.0 mmol/L    Chloride 106 98 - 107 mmol/L    Carbon Dioxide (CO2) 27 22 - 31 mmol/L    Anion Gap 10 5 - 18 mmol/L    Urea Nitrogen 13 8 - 28 mg/dL    Creatinine 0.84 0.60 - 1.10 mg/dL    Calcium 9.5 8.5 - 10.5 mg/dL    Glucose 140 (H) 70 - 125 mg/dL    Alkaline Phosphatase 64 45 - 120 U/L    AST 19 0 - 40 U/L    ALT 13 0 - 45 U/L    Protein Total 6.7 6.0 - 8.0 g/dL    Albumin 4.0 3.5 - 5.0 g/dL    Bilirubin Total 0.7 0.0 - 1.0 mg/dL    GFR Estimate 70 >60 mL/min/1.73m2   CBC with platelets and differential    Collection Time: 11/21/22  1:18 PM   Result Value Ref Range    WBC Count 6.0 4.0 - 11.0 10e3/uL    RBC Count 3.96 3.80 - 5.20 10e6/uL    Hemoglobin 14.8 11.7 - 15.7 g/dL    Hematocrit 45.6 35.0 - 47.0 %     (H) 78 - 100 fL    MCH 37.4 (H) 26.5 - 33.0 pg    MCHC 32.5 31.5 - 36.5 g/dL    RDW 13.4 10.0 - 15.0 %    Platelet Count 208 150 - 450 10e3/uL    % Neutrophils 72 %    % Lymphocytes 20 %    % Monocytes 7 %    % Eosinophils 1 %    % Basophils 0 %    % Immature Granulocytes 0 %    NRBCs per 100 WBC 0 <1 /100    Absolute Neutrophils 4.3 1.6 - 8.3 10e3/uL    Absolute Lymphocytes 1.2 0.8 - 5.3 10e3/uL    Absolute Monocytes 0.4 0.0 - 1.3 10e3/uL    Absolute Eosinophils 0.1 0.0 - 0.7 10e3/uL    Absolute Basophils 0.0 0.0 - 0.2 10e3/uL    Absolute Immature Granulocytes 0.0 <=0.4 10e3/uL    Absolute NRBCs 0.0 10e3/uL

## 2022-11-21 NOTE — PROGRESS NOTES
[facility] Hematology and Oncology Progress Note    Patient: Livier Bowers  MRN: [id number]  Date of Service: Nov 21, 2022         Reason for Visit:    Scheduled follow up    Assessment and Plan:    1.           Polycythemia, JAK2 and BCR-ABL negative:     81 year old     Suma presents alone and in good spirits.  She looks good and states that she's been feeling good.  She's tolerating current dosing of Hydrea well and without appreciable side effects.  No signs/symptoms of infection.   Her appetite, weight and performance status are very stable. She denies pain, cough, fever, chills, unusual headaches, visual or mentation disturbance, bowel or bladder issues, rash.      CMP - basically WNL.    CBC- WBC, differential, PLT and HgB WNL.  Hematocrit slightly increased @ 45.6%.  MCV elevated r/t Hydrea.    I reviewed Livier's labs with her.  Her hematocrit is just slightly above our goal of 42-45% on current dosing of Hydrea.  She has vacillated between 40 and 46% since starting this dose of Hydrea, so will hold at current dosing.    Livier is tolerating current Hydrea dosing without incidence.     Continue Hydrea 500 mg/day Monday through Friday with 1000 mg on Saturday and Sunday.    Continue daily aspirin therapy.  She can only tolerate baby aspirin as RS aspirin causes her stomach to feel poorly.  Instructed to take ASA with a meal.     Suma has opted against receiving the SARS-covid vaccine and has never had and doesn't want the seasonal flu vaccine.    4-month f/up with HEME 1 and CMP.      Hematologic History:     1.           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 Serum EPO was low normal.   ? Polycythemia initially appeared secondary, likely to COPD.  ? Phlebotomy was recommended, but she declined.   ? Sleep study was unremarkable.      Hydroxyurea was initiated at 500 mg daily; however, ineffective and therefore increased to 500 mg twice  daily.   ? She then cut back on her smoking and self manipulated the dose of Hydrea to take the least amount possible.   ? 2013, August - she quit smoking completely, switching to the E cigarette.  Hydrea was further decreased.  ? 2013, December - she was down to Hydrea 500 mg every other day and she opted to discontinue it.    2014, July - hematocrit elevated at 50.7 and hemoglobin 16.8.  She would only agree to restarting the Hydrea at 500 mg every other day.   ? Four week follow up in mid-August, 2014, found her Hct responding to low dose Hydrea. Wishing to be on the lowest dose possible of Hydrea she wanted to try every 3rd day Hydrea, 500 mg, as she was able to be off Hydrea therapy for approximately 7 months before her hemoglobin and hematocrit lorenzo to the lever of being concerning and therapy resumed.     ? 2014, September follow up found the Hct and HgB on the rise. She agreed to return to every other day dosing with Hydrea, 500 mg.    2015, January 19 - agreed to try phlebotomy and Hydrea was discontinued.   ? Prior phlebotomies:    2020 - 03/09, 01/07.    2019 - 01/19, 03/20, 06/12, 09/10, 10/08, 11/05.    2018 - 02/08, 04/25, 06/26, 08/29, 11/06.    2017 - 01/06, 02/01, 03/08, 04/14, 09/21, 11/02, 12/14.    2016 - 10/28, 10/07, 09/21, 05/24, 02/05, 01/22.    2015 - 10/22, 07/22, 05/29, 05/18, 04/06, 03/09, 01/28.    03/09/20 - With onset of iron deficiency, stopped phlebotomies.  Not anemic.  Resumed Hydrea, 500 mg every other day.   ? 03/16/20 Erythropoietin - WNL @ 11.    If EPO low - BM bx.  If EPO high - likely r/t her COPD.    03/23/20 Jak2 exon12, MPL and ADI testing - no mutations identified.     04/20/2020 - Hydrea increased to 500 mg daily (HcT @ 46.8).     12/22/2020 - Hydrea increased to 500 mg Monday - Saturday with 1000 mg on Sunday (HcT @ 45.5).     08/26/2021 - Hydrea increased to 500 mg Monday through Friday with 1000 mg Saturday and Saturday (hematocrit 46.3).     ECOG Performance:    0  "- Independent    Pain:    Pain Score: No Pain (0)    Encounter Diagnoses:    Problem List Items Addressed This Visit        Hematologic    Polycythemia vera (H) - Primary    Relevant Orders    CBC with platelets and differential    Comprehensive metabolic panel   Other Visit Diagnoses     High risk medication use        Relevant Orders    CBC with platelets and differential    Comprehensive metabolic panel             28 minutes spent on the date of the encounter doing chart review, history and exam, documentation and further activities as noted above.    Luke Guerrero, CNP     CC: Va Patten MD   ___________________________________________________________________    Review of Systems:    No fever or night sweats.  No loss of weight.  No lumps or bumps anywhere.  No unusual headaches or eyesight issues.  No dizziness.  No bleeding from the nose.  No sores in the mouth. No problems with swallowing.  No chest pain. No shortness of breath. No cough.  No abdominal pain. No nausea or vomiting.  No diarrhea or constipation.  No blood in stool or black colored stools.  No problems passing urine.  No numbness or tingling in hands or feet.  No skin rashes.    A 14 point review of systems is otherwise negative.    Past History:    Past Medical History:   Diagnosis Date     Osteoporosis      Parathyroid adenoma      Polycythemia vera (H)      Vitamin D deficiency        Past Surgical History:   Procedure Laterality Date     CATARACT EXTRACTION Left      DILATION AND CURETTAGE       KNEE SURGERY      torn meniscus     THYROIDECTOMY, PARTIAL N/A 12/2/2014    Procedure: RIGHT PARATHYROID EXCISION ;  Surgeon: Tressa Louis MD;  Location: [facility];  Service:      Physical Exam:    /84   Pulse 89   Resp 16   Ht 1.6 m (5' 3\")   Wt 55.4 kg (122 lb 1.6 oz)   SpO2 96%   BMI 21.63 kg/m      GENERAL:   Pleasant.  Alert and oriented. Seated comfortably. In no distress.    HEENT:   Atraumatic and " normocephalic.  RAVINDER.  EOMI.  No pallor.  No icterus.  No mucosal lesions.    LYMPH NODES:  No palpable cervical, axillary or inguinal lymphadenopathy.    CHEST:   Lungs clear to auscultation bilaterally.    CVS:    S1 and S2 heard. Regular rate and rhythm.  No murmur, gallop or rub heard.  No peripheral edema.    EXTREMITIES:  Warm.    SKIN:    No rash, bruising or purpura noted.  Full head of hair.    Lab Results:    Reviewed with patient.    Recent Results (from the past 168 hour(s))   Comprehensive metabolic panel   Result Value Ref Range    Sodium 143 136 - 145 mmol/L    Potassium 4.2 3.5 - 5.0 mmol/L    Chloride 106 98 - 107 mmol/L    Carbon Dioxide (CO2) 27 22 - 31 mmol/L    Anion Gap 10 5 - 18 mmol/L    Urea Nitrogen 13 8 - 28 mg/dL    Creatinine 0.84 0.60 - 1.10 mg/dL    Calcium 9.5 8.5 - 10.5 mg/dL    Glucose 140 (H) 70 - 125 mg/dL    Alkaline Phosphatase 64 45 - 120 U/L    AST 19 0 - 40 U/L    ALT 13 0 - 45 U/L    Protein Total 6.7 6.0 - 8.0 g/dL    Albumin 4.0 3.5 - 5.0 g/dL    Bilirubin Total 0.7 0.0 - 1.0 mg/dL    GFR Estimate 70 >60 mL/min/1.73m2   CBC with platelets and differential   Result Value Ref Range    WBC Count 6.0 4.0 - 11.0 10e3/uL    RBC Count 3.96 3.80 - 5.20 10e6/uL    Hemoglobin 14.8 11.7 - 15.7 g/dL    Hematocrit 45.6 35.0 - 47.0 %     (H) 78 - 100 fL    MCH 37.4 (H) 26.5 - 33.0 pg    MCHC 32.5 31.5 - 36.5 g/dL    RDW 13.4 10.0 - 15.0 %    Platelet Count 208 150 - 450 10e3/uL    % Neutrophils 72 %    % Lymphocytes 20 %    % Monocytes 7 %    % Eosinophils 1 %    % Basophils 0 %    % Immature Granulocytes 0 %    NRBCs per 100 WBC 0 <1 /100    Absolute Neutrophils 4.3 1.6 - 8.3 10e3/uL    Absolute Lymphocytes 1.2 0.8 - 5.3 10e3/uL    Absolute Monocytes 0.4 0.0 - 1.3 10e3/uL    Absolute Eosinophils 0.1 0.0 - 0.7 10e3/uL    Absolute Basophils 0.0 0.0 - 0.2 10e3/uL    Absolute Immature Granulocytes 0.0 <=0.4 10e3/uL    Absolute NRBCs 0.0 10e3/uL     Imaging:    No results found.

## 2022-11-21 NOTE — PROGRESS NOTES
"Oncology Rooming Note    November 21, 2022 1:55 PM   Livier Bowers is a 81 year old female who presents for:    Chief Complaint   Patient presents with     Oncology Clinic Visit     Polycythemia vera      Initial Vitals: /84   Pulse 89   Resp 16   Ht 1.6 m (5' 3\")   Wt 55.4 kg (122 lb 1.6 oz)   SpO2 96%   BMI 21.63 kg/m   Estimated body mass index is 21.63 kg/m  as calculated from the following:    Height as of this encounter: 1.6 m (5' 3\").    Weight as of this encounter: 55.4 kg (122 lb 1.6 oz). Body surface area is 1.57 meters squared.  No Pain (0) Comment: Data Unavailable   No LMP recorded.  Allergies reviewed: Yes  Medications reviewed: Yes    Medications: Medication refills not needed today.  Pharmacy name entered into [facility]: [facility] [id number] [place] [address]    Clinical concerns:  4 month follow up      Kristina Murguia            "

## 2023-03-22 ENCOUNTER — LAB (OUTPATIENT)
Dept: INFUSION THERAPY | Facility: CLINIC | Age: 83
End: 2023-03-22
Attending: NURSE PRACTITIONER
Payer: COMMERCIAL

## 2023-03-22 ENCOUNTER — ONCOLOGY VISIT (OUTPATIENT)
Dept: ONCOLOGY | Facility: CLINIC | Age: 83
End: 2023-03-22
Attending: NURSE PRACTITIONER
Payer: COMMERCIAL

## 2023-03-22 VITALS
RESPIRATION RATE: 16 BRPM | WEIGHT: 119 LBS | SYSTOLIC BLOOD PRESSURE: 140 MMHG | HEART RATE: 81 BPM | DIASTOLIC BLOOD PRESSURE: 83 MMHG | OXYGEN SATURATION: 97 % | BODY MASS INDEX: 21.09 KG/M2 | HEIGHT: 63 IN

## 2023-03-22 DIAGNOSIS — Z79.899 HIGH RISK MEDICATION USE: ICD-10-CM

## 2023-03-22 DIAGNOSIS — D45 POLYCYTHEMIA VERA (H): ICD-10-CM

## 2023-03-22 DIAGNOSIS — D45 POLYCYTHEMIA VERA (H): Primary | ICD-10-CM

## 2023-03-22 LAB
ALBUMIN SERPL-MCNC: 4 G/DL (ref 3.5–5)
ALP SERPL-CCNC: 66 U/L (ref 45–120)
ALT SERPL W P-5'-P-CCNC: 13 U/L (ref 0–45)
ANION GAP SERPL CALCULATED.3IONS-SCNC: 9 MMOL/L (ref 5–18)
AST SERPL W P-5'-P-CCNC: 22 U/L (ref 0–40)
BASOPHILS # BLD AUTO: 0.1 10E3/UL (ref 0–0.2)
BASOPHILS NFR BLD AUTO: 1 %
BILIRUB SERPL-MCNC: 0.5 MG/DL (ref 0–1)
BUN SERPL-MCNC: 16 MG/DL (ref 8–28)
CALCIUM SERPL-MCNC: 9.5 MG/DL (ref 8.5–10.5)
CHLORIDE BLD-SCNC: 106 MMOL/L (ref 98–107)
CO2 SERPL-SCNC: 27 MMOL/L (ref 22–31)
CREAT SERPL-MCNC: 0.77 MG/DL (ref 0.6–1.1)
EOSINOPHIL # BLD AUTO: 0.1 10E3/UL (ref 0–0.7)
EOSINOPHIL NFR BLD AUTO: 1 %
ERYTHROCYTE [DISTWIDTH] IN BLOOD BY AUTOMATED COUNT: 12.6 % (ref 10–15)
GFR SERPL CREATININE-BSD FRML MDRD: 78 ML/MIN/1.73M2
GLUCOSE BLD-MCNC: 132 MG/DL (ref 70–125)
HCT VFR BLD AUTO: 43.3 % (ref 35–47)
HGB BLD-MCNC: 14.5 G/DL (ref 11.7–15.7)
IMM GRANULOCYTES # BLD: 0 10E3/UL
IMM GRANULOCYTES NFR BLD: 1 %
LYMPHOCYTES # BLD AUTO: 1.6 10E3/UL (ref 0.8–5.3)
LYMPHOCYTES NFR BLD AUTO: 24 %
MCH RBC QN AUTO: 38.1 PG (ref 26.5–33)
MCHC RBC AUTO-ENTMCNC: 33.5 G/DL (ref 31.5–36.5)
MCV RBC AUTO: 114 FL (ref 78–100)
MONOCYTES # BLD AUTO: 0.6 10E3/UL (ref 0–1.3)
MONOCYTES NFR BLD AUTO: 9 %
NEUTROPHILS # BLD AUTO: 4.3 10E3/UL (ref 1.6–8.3)
NEUTROPHILS NFR BLD AUTO: 64 %
NRBC # BLD AUTO: 0 10E3/UL
NRBC BLD AUTO-RTO: 0 /100
PLATELET # BLD AUTO: 240 10E3/UL (ref 150–450)
POTASSIUM BLD-SCNC: 4.2 MMOL/L (ref 3.5–5)
PROT SERPL-MCNC: 6.8 G/DL (ref 6–8)
RBC # BLD AUTO: 3.81 10E6/UL (ref 3.8–5.2)
SODIUM SERPL-SCNC: 142 MMOL/L (ref 136–145)
WBC # BLD AUTO: 6.6 10E3/UL (ref 4–11)

## 2023-03-22 PROCEDURE — 36415 COLL VENOUS BLD VENIPUNCTURE: CPT

## 2023-03-22 PROCEDURE — 99213 OFFICE O/P EST LOW 20 MIN: CPT | Performed by: NURSE PRACTITIONER

## 2023-03-22 PROCEDURE — 80053 COMPREHEN METABOLIC PANEL: CPT

## 2023-03-22 PROCEDURE — G0463 HOSPITAL OUTPT CLINIC VISIT: HCPCS | Performed by: NURSE PRACTITIONER

## 2023-03-22 PROCEDURE — 85004 AUTOMATED DIFF WBC COUNT: CPT

## 2023-03-22 ASSESSMENT — PAIN SCALES - GENERAL: PAINLEVEL: NO PAIN (0)

## 2023-03-22 NOTE — PROGRESS NOTES
[facility] Hematology and Oncology Progress Note    Patient: Livier Bowers  MRN: [id number]  Date of Service: Mar 22, 2023         Reason for Visit:    Scheduled follow up    Assessment and Plan:    1.           Polycythemia, JAK2 and BCR-ABL negative:     82 year old     Suma presents alone and in good spirits.  She looks good and states that, overall, she's been feeling good other than the body aches that accompany turning 82 years old.  She's tolerating current dosing of Hydrea well and without appreciable side effects.  No signs/symptoms of infection.   Her appetite, weight and performance status are very stable.  She denies pain, cough, fever, chills, unusual headaches, visual or mentation disturbance, bowel or bladder issues, rash.      CMP - basically WNL.    CBC- WBC, differential, PLT and HgB WNL.  Hematocrit slightly decreased @ 43.3%.  MCV elevated reflecting Hydrea compliance.    I reviewed iLvier's labs with her.  Her hematocrit is within our goal of 42-45% on the current dosing of Hydrea she's now been on for ~1.5 years.  Her hematocrit has vacillated between 40 and 46% since starting this dose of Hydrea.    Livier is tolerating current Hydrea dosing without incidence.     Continue Hydrea 500 mg/day Monday through Friday with 1000 mg on Saturday and Sunday.    Continue daily aspirin therapy.  She can only tolerate baby aspirin as RS aspirin causes her stomach to feel poorly.  Instructed to take ASA with a meal.     Suma has opted against receiving the SARS-covid vaccine and has never had and doesn't want the seasonal flu vaccine.    4-month f/up with CBC and CMP.  She has requested yearly in-person visits, otherwise virtual with labs in advance.    Hematologic History:     1.           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 Serum EPO was low normal.   ? Polycythemia initially appeared secondary, likely to COPD.  ? Phlebotomy was recommended,  but she declined.   ? Sleep study was unremarkable.      Hydroxyurea was initiated at 500 mg daily; however, ineffective and therefore increased to 500 mg twice daily.   ? She then cut back on her smoking and self manipulated the dose of Hydrea to take the least amount possible.   ? 2013, August - she quit smoking completely, switching to the E cigarette.  Hydrea was further decreased.  ? 2013, December - she was down to Hydrea 500 mg every other day and she opted to discontinue it.    2014, July - hematocrit elevated at 50.7 and hemoglobin 16.8.  She would only agree to restarting the Hydrea at 500 mg every other day.   ? Four week follow up in mid-August, 2014, found her Hct responding to low dose Hydrea. Wishing to be on the lowest dose possible of Hydrea she wanted to try every 3rd day Hydrea, 500 mg, as she was able to be off Hydrea therapy for approximately 7 months before her hemoglobin and hematocrit lorenzo to the lever of being concerning and therapy resumed.     ? 2014, September follow up found the Hct and HgB on the rise. She agreed to return to every other day dosing with Hydrea, 500 mg.    2015, January 19 - agreed to try phlebotomy and Hydrea was discontinued.   ? Prior phlebotomies:    2020 - 03/09, 01/07.    2019 - 01/19, 03/20, 06/12, 09/10, 10/08, 11/05.    2018 - 02/08, 04/25, 06/26, 08/29, 11/06.    2017 - 01/06, 02/01, 03/08, 04/14, 09/21, 11/02, 12/14.    2016 - 10/28, 10/07, 09/21, 05/24, 02/05, 01/22.    2015 - 10/22, 07/22, 05/29, 05/18, 04/06, 03/09, 01/28.    03/09/20 - With onset of iron deficiency, stopped phlebotomies.  Not anemic.  Resumed Hydrea, 500 mg every other day.   ? 03/16/20 Erythropoietin - WNL @ 11.    If EPO low - BM bx.  If EPO high - likely r/t her COPD.    03/23/20 Jak2 exon12, MPL and ADI testing - no mutations identified.     04/20/2020 - Hydrea increased to 500 mg daily (HcT @ 46.8).     12/22/2020 - Hydrea increased to 500 mg Monday - Saturday with 1000 mg on  "Sunday (HcT @ 45.5).     08/26/2021 - Hydrea increased to 500 mg Monday through Friday with 1000 mg Saturday and Saturday.     ECOG Performance:    0 - Independent    Pain:    Pain Score: No Pain (0)    Encounter Diagnoses:    Problem List Items Addressed This Visit    None         26 minutes of time were spent on the date of this encounter with chart review, face to face time with the patient, examination, recommendations, documentation, and communication of the plan of care to the care team.    Luke Guerrero, CNP     CC: Va Patten MD   ___________________________________________________________________    Review of Systems:    No fever or night sweats.  No loss of weight.  No lumps or bumps anywhere.  No unusual headaches or eyesight issues.  No dizziness.  No bleeding from the nose.  No sores in the mouth. No problems with swallowing.  No chest pain. No shortness of breath. No cough.  No abdominal pain. No nausea or vomiting.  No diarrhea or constipation.  No blood in stool or black colored stools.  No problems passing urine.  No numbness or tingling in hands or feet.  No skin rashes.    A 14 point review of systems is otherwise negative.    Past History:    Past Medical History:   Diagnosis Date     Osteoporosis      Parathyroid adenoma      Polycythemia vera (H)      Vitamin D deficiency        Past Surgical History:   Procedure Laterality Date     CATARACT EXTRACTION Left      DILATION AND CURETTAGE       KNEE SURGERY      torn meniscus     THYROIDECTOMY, PARTIAL N/A 12/2/2014    Procedure: RIGHT PARATHYROID EXCISION ;  Surgeon: Tressa Louis MD;  Location: [facility];  Service:      Physical Exam:    BP (!) 140/83   Pulse 81   Resp 16   Ht 1.6 m (5' 3\")   Wt 54 kg (119 lb)   SpO2 97%   BMI 21.08 kg/m      GENERAL:   Very pleasant.  Alert and oriented. Seated comfortably. In no distress.    HEENT:   Atraumatic and normocephalic.  RAVINDER.  EOMI.  No pallor.  No icterus.  No " mucosal lesions.    LYMPH NODES:  No palpable cervical, axillary or inguinal lymphadenopathy.    CHEST:   Lungs clear to auscultation bilaterally.    ABDOMEN:  Soft.  Not tender.  Not distended.  No organomegaly.    CVS:    S1 and S2 heard.  Regular rate and rhythm.  No murmur, gallop or rub heard.  No peripheral edema.    EXTREMITIES:  Warm.    SKIN:    No rash, bruising or purpura noted.  Full head of hair.    Lab Results:    Reviewed with patient.    Recent Results (from the past 168 hour(s))   Comprehensive metabolic panel   Result Value Ref Range    Sodium 142 136 - 145 mmol/L    Potassium 4.2 3.5 - 5.0 mmol/L    Chloride 106 98 - 107 mmol/L    Carbon Dioxide (CO2) 27 22 - 31 mmol/L    Anion Gap 9 5 - 18 mmol/L    Urea Nitrogen 16 8 - 28 mg/dL    Creatinine 0.77 0.60 - 1.10 mg/dL    Calcium 9.5 8.5 - 10.5 mg/dL    Glucose 132 (H) 70 - 125 mg/dL    Alkaline Phosphatase 66 45 - 120 U/L    AST 22 0 - 40 U/L    ALT 13 0 - 45 U/L    Protein Total 6.8 6.0 - 8.0 g/dL    Albumin 4.0 3.5 - 5.0 g/dL    Bilirubin Total 0.5 0.0 - 1.0 mg/dL    GFR Estimate 78 >60 mL/min/1.73m2   CBC with platelets and differential   Result Value Ref Range    WBC Count 6.6 4.0 - 11.0 10e3/uL    RBC Count 3.81 3.80 - 5.20 10e6/uL    Hemoglobin 14.5 11.7 - 15.7 g/dL    Hematocrit 43.3 35.0 - 47.0 %     (H) 78 - 100 fL    MCH 38.1 (H) 26.5 - 33.0 pg    MCHC 33.5 31.5 - 36.5 g/dL    RDW 12.6 10.0 - 15.0 %    Platelet Count 240 150 - 450 10e3/uL    % Neutrophils 64 %    % Lymphocytes 24 %    % Monocytes 9 %    % Eosinophils 1 %    % Basophils 1 %    % Immature Granulocytes 1 %    NRBCs per 100 WBC 0 <1 /100    Absolute Neutrophils 4.3 1.6 - 8.3 10e3/uL    Absolute Lymphocytes 1.6 0.8 - 5.3 10e3/uL    Absolute Monocytes 0.6 0.0 - 1.3 10e3/uL    Absolute Eosinophils 0.1 0.0 - 0.7 10e3/uL    Absolute Basophils 0.1 0.0 - 0.2 10e3/uL    Absolute Immature Granulocytes 0.0 <=0.4 10e3/uL    Absolute NRBCs 0.0 10e3/uL     Imaging:    No results  found.

## 2023-03-22 NOTE — LETTER
3/22/2023         RE: Livier Bowers  [address] [place] [address]        Dear Colleague,    Thank you for referring your patient, Livier Bowers, to the [facility] [place]. Please see a copy of my visit note below.    [facility] Hematology and Oncology Progress Note    Patient: Livier Bowers  MRN: [id number]  Date of Service: Mar 22, 2023         Reason for Visit:    Scheduled follow up    Assessment and Plan:    1.           Polycythemia, JAK2 and BCR-ABL negative:     82 year old     Suma presents alone and in good spirits.  She looks good and states that, overall, she's been feeling good other than the body aches that accompany turning 82 years old.  She's tolerating current dosing of Hydrea well and without appreciable side effects.  No signs/symptoms of infection.   Her appetite, weight and performance status are very stable.  She denies pain, cough, fever, chills, unusual headaches, visual or mentation disturbance, bowel or bladder issues, rash.      CMP - basically WNL.    CBC- WBC, differential, PLT and HgB WNL.  Hematocrit slightly decreased @ 43.3%.  MCV elevated reflecting Hydrea compliance.    I reviewed Livier's labs with her.  Her hematocrit is within our goal of 42-45% on the current dosing of Hydrea she's now been on for ~1.5 years.  Her hematocrit has vacillated between 40 and 46% since starting this dose of Hydrea.    Livier is tolerating current Hydrea dosing without incidence.     Continue Hydrea 500 mg/day Monday through Friday with 1000 mg on Saturday and Sunday.    Continue daily aspirin therapy.  She can only tolerate baby aspirin as RS aspirin causes her stomach to feel poorly.  Instructed to take ASA with a meal.     Suma has opted against receiving the SARS-covid vaccine and has never had and doesn't want the seasonal flu vaccine.    4-month f/up with CBC and CMP.  She has requested yearly in-person visits, otherwise virtual with labs in  advance.    Hematologic History:     1.           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 Serum EPO was low normal.   ? Polycythemia initially appeared secondary, likely to COPD.  ? Phlebotomy was recommended, but she declined.   ? Sleep study was unremarkable.      Hydroxyurea was initiated at 500 mg daily; however, ineffective and therefore increased to 500 mg twice daily.   ? She then cut back on her smoking and self manipulated the dose of Hydrea to take the least amount possible.   ? 2013, August - she quit smoking completely, switching to the E cigarette.  Hydrea was further decreased.  ? 2013, December - she was down to Hydrea 500 mg every other day and she opted to discontinue it.    2014, July - hematocrit elevated at 50.7 and hemoglobin 16.8.  She would only agree to restarting the Hydrea at 500 mg every other day.   ? Four week follow up in mid-August, 2014, found her Hct responding to low dose Hydrea. Wishing to be on the lowest dose possible of Hydrea she wanted to try every 3rd day Hydrea, 500 mg, as she was able to be off Hydrea therapy for approximately 7 months before her hemoglobin and hematocrit lorenzo to the lever of being concerning and therapy resumed.     ? 2014, September follow up found the Hct and HgB on the rise. She agreed to return to every other day dosing with Hydrea, 500 mg.    2015, January 19 - agreed to try phlebotomy and Hydrea was discontinued.   ? Prior phlebotomies:    2020 - 03/09, 01/07.    2019 - 01/19, 03/20, 06/12, 09/10, 10/08, 11/05.    2018 - 02/08, 04/25, 06/26, 08/29, 11/06.    2017 - 01/06, 02/01, 03/08, 04/14, 09/21, 11/02, 12/14.    2016 - 10/28, 10/07, 09/21, 05/24, 02/05, 01/22.    2015 - 10/22, 07/22, 05/29, 05/18, 04/06, 03/09, 01/28.    03/09/20 - With onset of iron deficiency, stopped phlebotomies.  Not anemic.  Resumed Hydrea, 500 mg every other day.   ? 03/16/20 Erythropoietin - WNL @ 11.    If EPO low - BM  bx.  If EPO high - likely r/t her COPD.    03/23/20 Jak2 exon12, MPL and ADI testing - no mutations identified.     04/20/2020 - Hydrea increased to 500 mg daily (HcT @ 46.8).     12/22/2020 - Hydrea increased to 500 mg Monday - Saturday with 1000 mg on Sunday (HcT @ 45.5).     08/26/2021 - Hydrea increased to 500 mg Monday through Friday with 1000 mg Saturday and Saturday.     ECOG Performance:    0 - Independent    Pain:    Pain Score: No Pain (0)    Encounter Diagnoses:    Problem List Items Addressed This Visit    None         26 minutes of time were spent on the date of this encounter with chart review, face to face time with the patient, examination, recommendations, documentation, and communication of the plan of care to the care team.    Luke Guerrero, CNP     CC: Va Patten MD   ___________________________________________________________________    Review of Systems:    No fever or night sweats.  No loss of weight.  No lumps or bumps anywhere.  No unusual headaches or eyesight issues.  No dizziness.  No bleeding from the nose.  No sores in the mouth. No problems with swallowing.  No chest pain. No shortness of breath. No cough.  No abdominal pain. No nausea or vomiting.  No diarrhea or constipation.  No blood in stool or black colored stools.  No problems passing urine.  No numbness or tingling in hands or feet.  No skin rashes.    A 14 point review of systems is otherwise negative.    Past History:    Past Medical History:   Diagnosis Date     Osteoporosis      Parathyroid adenoma      Polycythemia vera (H)      Vitamin D deficiency        Past Surgical History:   Procedure Laterality Date     CATARACT EXTRACTION Left      DILATION AND CURETTAGE       KNEE SURGERY      torn meniscus     THYROIDECTOMY, PARTIAL N/A 12/2/2014    Procedure: RIGHT PARATHYROID EXCISION ;  Surgeon: Tressa Louis MD;  Location: [facility];  Service:      Physical Exam:    BP (!) 140/83   Pulse 81    "Resp 16   Ht 1.6 m (5' 3\")   Wt 54 kg (119 lb)   SpO2 97%   BMI 21.08 kg/m      GENERAL:   Very pleasant.  Alert and oriented. Seated comfortably. In no distress.    HEENT:   Atraumatic and normocephalic.  RAVINDER.  EOMI.  No pallor.  No icterus.  No mucosal lesions.    LYMPH NODES:  No palpable cervical, axillary or inguinal lymphadenopathy.    CHEST:   Lungs clear to auscultation bilaterally.    ABDOMEN:  Soft.  Not tender.  Not distended.  No organomegaly.    CVS:    S1 and S2 heard.  Regular rate and rhythm.  No murmur, gallop or rub heard.  No peripheral edema.    EXTREMITIES:  Warm.    SKIN:    No rash, bruising or purpura noted.  Full head of hair.    Lab Results:    Reviewed with patient.    Recent Results (from the past 168 hour(s))   Comprehensive metabolic panel   Result Value Ref Range    Sodium 142 136 - 145 mmol/L    Potassium 4.2 3.5 - 5.0 mmol/L    Chloride 106 98 - 107 mmol/L    Carbon Dioxide (CO2) 27 22 - 31 mmol/L    Anion Gap 9 5 - 18 mmol/L    Urea Nitrogen 16 8 - 28 mg/dL    Creatinine 0.77 0.60 - 1.10 mg/dL    Calcium 9.5 8.5 - 10.5 mg/dL    Glucose 132 (H) 70 - 125 mg/dL    Alkaline Phosphatase 66 45 - 120 U/L    AST 22 0 - 40 U/L    ALT 13 0 - 45 U/L    Protein Total 6.8 6.0 - 8.0 g/dL    Albumin 4.0 3.5 - 5.0 g/dL    Bilirubin Total 0.5 0.0 - 1.0 mg/dL    GFR Estimate 78 >60 mL/min/1.73m2   CBC with platelets and differential   Result Value Ref Range    WBC Count 6.6 4.0 - 11.0 10e3/uL    RBC Count 3.81 3.80 - 5.20 10e6/uL    Hemoglobin 14.5 11.7 - 15.7 g/dL    Hematocrit 43.3 35.0 - 47.0 %     (H) 78 - 100 fL    MCH 38.1 (H) 26.5 - 33.0 pg    MCHC 33.5 31.5 - 36.5 g/dL    RDW 12.6 10.0 - 15.0 %    Platelet Count 240 150 - 450 10e3/uL    % Neutrophils 64 %    % Lymphocytes 24 %    % Monocytes 9 %    % Eosinophils 1 %    % Basophils 1 %    % Immature Granulocytes 1 %    NRBCs per 100 WBC 0 <1 /100    Absolute Neutrophils 4.3 1.6 - 8.3 10e3/uL    Absolute Lymphocytes 1.6 0.8 - 5.3 " "10e3/uL    Absolute Monocytes 0.6 0.0 - 1.3 10e3/uL    Absolute Eosinophils 0.1 0.0 - 0.7 10e3/uL    Absolute Basophils 0.1 0.0 - 0.2 10e3/uL    Absolute Immature Granulocytes 0.0 <=0.4 10e3/uL    Absolute NRBCs 0.0 10e3/uL     Imaging:    No results found.    Oncology Rooming Note    March 22, 2023 2:10 PM   Livier Bowers is a 82 year old female who presents for:    Chief Complaint   Patient presents with     Oncology Clinic Visit     Polycythemia vera,      Initial Vitals: BP (!) 140/83   Pulse 81   Resp 16   Ht 1.6 m (5' 3\")   Wt 54 kg (119 lb)   SpO2 97%   BMI 21.08 kg/m   Estimated body mass index is 21.08 kg/m  as calculated from the following:    Height as of this encounter: 1.6 m (5' 3\").    Weight as of this encounter: 54 kg (119 lb). Body surface area is 1.55 meters squared.  No Pain (0) Comment: Data Unavailable   No LMP recorded.  Allergies reviewed: Yes  Medications reviewed: Yes    Medications: Medication refills not needed today.  Pharmacy name entered into [employer]: [id number] [place] [address]    Clinical concerns:  4 month labs and follow up      Kristina Murguia                Again, thank you for allowing me to participate in the care of your patient.        Sincerely,        Luke Guerrero, CNP    "

## 2023-03-22 NOTE — PROGRESS NOTES
"Oncology Rooming Note    March 22, 2023 2:10 PM   Livier Bowers is a 82 year old female who presents for:    Chief Complaint   Patient presents with     Oncology Clinic Visit     Polycythemia vera,      Initial Vitals: BP (!) 140/83   Pulse 81   Resp 16   Ht 1.6 m (5' 3\")   Wt 54 kg (119 lb)   SpO2 97%   BMI 21.08 kg/m   Estimated body mass index is 21.08 kg/m  as calculated from the following:    Height as of this encounter: 1.6 m (5' 3\").    Weight as of this encounter: 54 kg (119 lb). Body surface area is 1.55 meters squared.  No Pain (0) Comment: Data Unavailable   No LMP recorded.  Allergies reviewed: Yes  Medications reviewed: Yes    Medications: Medication refills not needed today.  Pharmacy name entered into [employer]: [id number] [place] [address]    Clinical concerns:  4 month labs and follow up      Kristina Murguia            "

## 2023-03-22 NOTE — PATIENT INSTRUCTIONS
Recent Results (from the past 24 hour(s))   Comprehensive metabolic panel    Collection Time: 03/22/23  1:46 PM   Result Value Ref Range    Sodium 142 136 - 145 mmol/L    Potassium 4.2 3.5 - 5.0 mmol/L    Chloride 106 98 - 107 mmol/L    Carbon Dioxide (CO2) 27 22 - 31 mmol/L    Anion Gap 9 5 - 18 mmol/L    Urea Nitrogen 16 8 - 28 mg/dL    Creatinine 0.77 0.60 - 1.10 mg/dL    Calcium 9.5 8.5 - 10.5 mg/dL    Glucose 132 (H) 70 - 125 mg/dL    Alkaline Phosphatase 66 45 - 120 U/L    AST 22 0 - 40 U/L    ALT 13 0 - 45 U/L    Protein Total 6.8 6.0 - 8.0 g/dL    Albumin 4.0 3.5 - 5.0 g/dL    Bilirubin Total 0.5 0.0 - 1.0 mg/dL    GFR Estimate 78 >60 mL/min/1.73m2   CBC with platelets and differential    Collection Time: 03/22/23  1:46 PM   Result Value Ref Range    WBC Count 6.6 4.0 - 11.0 10e3/uL    RBC Count 3.81 3.80 - 5.20 10e6/uL    Hemoglobin 14.5 11.7 - 15.7 g/dL    Hematocrit 43.3 35.0 - 47.0 %     (H) 78 - 100 fL    MCH 38.1 (H) 26.5 - 33.0 pg    MCHC 33.5 31.5 - 36.5 g/dL    RDW 12.6 10.0 - 15.0 %    Platelet Count 240 150 - 450 10e3/uL    % Neutrophils 64 %    % Lymphocytes 24 %    % Monocytes 9 %    % Eosinophils 1 %    % Basophils 1 %    % Immature Granulocytes 1 %    NRBCs per 100 WBC 0 <1 /100    Absolute Neutrophils 4.3 1.6 - 8.3 10e3/uL    Absolute Lymphocytes 1.6 0.8 - 5.3 10e3/uL    Absolute Monocytes 0.6 0.0 - 1.3 10e3/uL    Absolute Eosinophils 0.1 0.0 - 0.7 10e3/uL    Absolute Basophils 0.1 0.0 - 0.2 10e3/uL    Absolute Immature Granulocytes 0.0 <=0.4 10e3/uL    Absolute NRBCs 0.0 10e3/uL

## 2023-07-10 ENCOUNTER — ONCOLOGY VISIT (OUTPATIENT)
Dept: ONCOLOGY | Facility: CLINIC | Age: 83
End: 2023-07-10
Attending: NURSE PRACTITIONER
Payer: COMMERCIAL

## 2023-07-10 ENCOUNTER — LAB (OUTPATIENT)
Dept: INFUSION THERAPY | Facility: CLINIC | Age: 83
End: 2023-07-10
Attending: NURSE PRACTITIONER
Payer: COMMERCIAL

## 2023-07-10 VITALS
DIASTOLIC BLOOD PRESSURE: 67 MMHG | BODY MASS INDEX: 21.05 KG/M2 | HEART RATE: 86 BPM | SYSTOLIC BLOOD PRESSURE: 148 MMHG | OXYGEN SATURATION: 98 % | HEIGHT: 63 IN | RESPIRATION RATE: 18 BRPM | TEMPERATURE: 98.1 F | WEIGHT: 118.8 LBS

## 2023-07-10 DIAGNOSIS — Z79.899 HIGH RISK MEDICATION USE: ICD-10-CM

## 2023-07-10 DIAGNOSIS — D45 POLYCYTHEMIA VERA (H): Primary | ICD-10-CM

## 2023-07-10 DIAGNOSIS — D45 POLYCYTHEMIA VERA (H): ICD-10-CM

## 2023-07-10 LAB
ALBUMIN SERPL-MCNC: 3.9 G/DL (ref 3.5–5)
ALP SERPL-CCNC: 60 U/L (ref 45–120)
ALT SERPL W P-5'-P-CCNC: 14 U/L (ref 0–45)
ANION GAP SERPL CALCULATED.3IONS-SCNC: 9 MMOL/L (ref 5–18)
AST SERPL W P-5'-P-CCNC: 19 U/L (ref 0–40)
BILIRUB SERPL-MCNC: 0.5 MG/DL (ref 0–1)
BUN SERPL-MCNC: 15 MG/DL (ref 8–28)
CALCIUM SERPL-MCNC: 9 MG/DL (ref 8.5–10.5)
CHLORIDE BLD-SCNC: 107 MMOL/L (ref 98–107)
CO2 SERPL-SCNC: 25 MMOL/L (ref 22–31)
CREAT SERPL-MCNC: 0.81 MG/DL (ref 0.6–1.1)
ERYTHROCYTE [DISTWIDTH] IN BLOOD BY AUTOMATED COUNT: 13 % (ref 10–15)
GFR SERPL CREATININE-BSD FRML MDRD: 73 ML/MIN/1.73M2
GLUCOSE BLD-MCNC: 98 MG/DL (ref 70–125)
HCT VFR BLD AUTO: 43.2 % (ref 35–47)
HGB BLD-MCNC: 14.4 G/DL (ref 11.7–15.7)
MCH RBC QN AUTO: 37.2 PG (ref 26.5–33)
MCHC RBC AUTO-ENTMCNC: 33.3 G/DL (ref 31.5–36.5)
MCV RBC AUTO: 112 FL (ref 78–100)
PLATELET # BLD AUTO: 237 10E3/UL (ref 150–450)
POTASSIUM BLD-SCNC: 4.1 MMOL/L (ref 3.5–5)
PROT SERPL-MCNC: 6.4 G/DL (ref 6–8)
RBC # BLD AUTO: 3.87 10E6/UL (ref 3.8–5.2)
SODIUM SERPL-SCNC: 141 MMOL/L (ref 136–145)
WBC # BLD AUTO: 5.8 10E3/UL (ref 4–11)

## 2023-07-10 PROCEDURE — G0463 HOSPITAL OUTPT CLINIC VISIT: HCPCS | Performed by: NURSE PRACTITIONER

## 2023-07-10 PROCEDURE — 80053 COMPREHEN METABOLIC PANEL: CPT

## 2023-07-10 PROCEDURE — 36415 COLL VENOUS BLD VENIPUNCTURE: CPT

## 2023-07-10 PROCEDURE — 85027 COMPLETE CBC AUTOMATED: CPT

## 2023-07-10 PROCEDURE — 99213 OFFICE O/P EST LOW 20 MIN: CPT | Performed by: NURSE PRACTITIONER

## 2023-07-10 ASSESSMENT — PAIN SCALES - GENERAL: PAINLEVEL: NO PAIN (0)

## 2023-07-10 NOTE — PROGRESS NOTES
[facility] Hematology and Oncology Progress Note    Patient: Livier Bowers  MRN: [id number]  Date of Service: Jul 10, 2023         Reason for Visit:    Scheduled follow up    Assessment and Plan:    1.           Polycythemia, JAK2 and BCR-ABL negative:     82 year old     Suma presents alone and in good spirits.  She looks good and states that she's been feeling good other than the body aches that accompany her age.  She's tolerating current dosing of Hydrea well and without appreciable side effects.  No signs/symptoms of infection.   Her appetite is good - quite stable weight this past year.  Very stable performance status.  She denies pain, cough, fever, chills, unusual headaches, visual or mentation disturbance, bowel or bladder issues, rash.      CMP - WNL.    CBC- WBC, PLT and HgB WNL.  Hematocrit slightly decreased @ 43.2%.  MCV elevated reflecting Hydrea compliance.    I reviewed Livier's labs with her.  Her hematocrit is within our goal of 42-45% on the current dosing of Hydrea she's now been on for nearly 2-years.  Her hematocrit has vacillated between 40 and 46% since starting this dose of Hydrea.    Livier is tolerating current Hydrea dosing without incidence.     Continue Hydrea 500 mg/day Monday through Friday with 1000 mg on Saturday and Sunday.    Continue daily aspirin therapy.  She can only tolerate baby aspirin as RS aspirin causes her stomach to feel poorly.  Instructed to take ASA with a meal.     Suma has opted against receiving the SARS-covid vaccine and has never had and doesn't want the seasonal flu vaccine.    4-month f/up with CBC and CMP.  She has requested yearly in-person visits, otherwise virtual with labs in advance.    Hematologic History:     1.           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 Serum EPO was low normal.   ? Polycythemia initially appeared secondary, likely to COPD.  ? Phlebotomy was recommended, but she  declined.   ? Sleep study was unremarkable.      Hydroxyurea was initiated at 500 mg daily; however, ineffective and therefore increased to 500 mg twice daily.   ? She then cut back on her smoking and self manipulated the dose of Hydrea to take the least amount possible.   ? 2013, August - she quit smoking completely, switching to the E cigarette.  Hydrea was further decreased.  ? 2013, December - she was down to Hydrea 500 mg every other day and she opted to discontinue it.    2014, July - hematocrit elevated at 50.7 and hemoglobin 16.8.  She would only agree to restarting the Hydrea at 500 mg every other day.   ? Four week follow up in mid-August, 2014, found her Hct responding to low dose Hydrea. Wishing to be on the lowest dose possible of Hydrea she wanted to try every 3rd day Hydrea, 500 mg, as she was able to be off Hydrea therapy for approximately 7 months before her hemoglobin and hematocrit lorenzo to the lever of being concerning and therapy resumed.     ? 2014, September follow up found the Hct and HgB on the rise. She agreed to return to every other day dosing with Hydrea, 500 mg.    2015, January 19 - agreed to try phlebotomy and Hydrea was discontinued.   ? Prior phlebotomies:    2020 - 03/09, 01/07.    2019 - 01/19, 03/20, 06/12, 09/10, 10/08, 11/05.    2018 - 02/08, 04/25, 06/26, 08/29, 11/06.    2017 - 01/06, 02/01, 03/08, 04/14, 09/21, 11/02, 12/14.    2016 - 10/28, 10/07, 09/21, 05/24, 02/05, 01/22.    2015 - 10/22, 07/22, 05/29, 05/18, 04/06, 03/09, 01/28.    03/09/20 - With onset of iron deficiency, stopped phlebotomies.  Not anemic.  Resumed Hydrea, 500 mg every other day.   ? 03/16/20 Erythropoietin - WNL @ 11.    If EPO low - BM bx.  If EPO high - likely r/t her COPD.    03/23/20 Jak2 exon12, MPL and ADI testing - no mutations identified.     04/20/2020 - Hydrea increased to 500 mg daily (HcT @ 46.8).     12/22/2020 - Hydrea increased to 500 mg Monday - Saturday with 1000 mg on Sunday (HcT @  45.5).     08/26/2021 - Hydrea increased to 500 mg Monday through Friday with 1000 mg Saturday and Saturday.     ECOG Performance:    0 - Independent    Pain:    Pain Score: No Pain (0)    Encounter Diagnoses:    Problem List Items Addressed This Visit        Hematologic    Polycythemia vera (H) - Primary    Relevant Orders    CBC with platelets and differential    Comprehensive metabolic panel   Other Visit Diagnoses     High risk medication use        Relevant Orders    CBC with platelets and differential    Comprehensive metabolic panel             28 minutes of time were spent on the date of this encounter with chart review, face to face time with the patient, examination, recommendations, documentation, and communication of the plan of care to the care team.    Luke Guerrero, CNP     CC: Va Patten MD   ___________________________________________________________________    Review of Systems:    No fever or night sweats.  No loss of weight.  No lumps or bumps anywhere.  No unusual headaches or eyesight issues.  No dizziness.  No bleeding from the nose.  No sores in the mouth. No problems with swallowing.  No chest pain. No shortness of breath. No cough.  No abdominal pain. No nausea or vomiting.  No diarrhea or constipation.  No blood in stool or black colored stools.  No problems passing urine.  No numbness or tingling in hands or feet.  No skin rashes.    A 14 point review of systems is otherwise negative.    Past History:    Past Medical History:   Diagnosis Date     Osteoporosis      Parathyroid adenoma      Polycythemia vera (H)      Vitamin D deficiency        Past Surgical History:   Procedure Laterality Date     CATARACT EXTRACTION Left      DILATION AND CURETTAGE       KNEE SURGERY      torn meniscus     THYROIDECTOMY, PARTIAL N/A 12/2/2014    Procedure: RIGHT PARATHYROID EXCISION ;  Surgeon: Tressa Louis MD;  Location: [facility];  Service:      Physical Exam:    BP (!)  "148/67   Pulse 86   Temp 98.1  F (36.7  C)   Resp 18   Ht 1.6 m (5' 3\")   Wt 53.9 kg (118 lb 12.8 oz)   SpO2 98%   BMI 21.04 kg/m      GENERAL:   Very pleasant.  Alert and oriented. Seated comfortably. In no distress.    HEENT:   Atraumatic and normocephalic.  RAVINDER.  EOMI.  No pallor.  No icterus.  No mucosal lesions.    LYMPH NODES:  No palpable cervical, axillary or inguinal lymphadenopathy.    CHEST:   Lungs clear to auscultation bilaterally.    ABDOMEN:  Soft.  Not tender or distended.  No organomegaly.    CVS:    S1 and S2 heard.  Regular rate and rhythm.  No murmur, gallop or rub heard.  No peripheral edema.    EXTREMITIES:  Warm.    SKIN:    No rash, bruising or purpura noted.  Full head of hair.    Lab Results:    Reviewed with patient.    Recent Results (from the past 168 hour(s))   CBC with platelets   Result Value Ref Range    WBC Count 5.8 4.0 - 11.0 10e3/uL    RBC Count 3.87 3.80 - 5.20 10e6/uL    Hemoglobin 14.4 11.7 - 15.7 g/dL    Hematocrit 43.2 35.0 - 47.0 %     (H) 78 - 100 fL    MCH 37.2 (H) 26.5 - 33.0 pg    MCHC 33.3 31.5 - 36.5 g/dL    RDW 13.0 10.0 - 15.0 %    Platelet Count 237 150 - 450 10e3/uL   Comprehensive metabolic panel   Result Value Ref Range    Sodium 141 136 - 145 mmol/L    Potassium 4.1 3.5 - 5.0 mmol/L    Chloride 107 98 - 107 mmol/L    Carbon Dioxide (CO2) 25 22 - 31 mmol/L    Anion Gap 9 5 - 18 mmol/L    Urea Nitrogen 15 8 - 28 mg/dL    Creatinine 0.81 0.60 - 1.10 mg/dL    Calcium 9.0 8.5 - 10.5 mg/dL    Glucose 98 70 - 125 mg/dL    Alkaline Phosphatase 60 45 - 120 U/L    AST 19 0 - 40 U/L    ALT 14 0 - 45 U/L    Protein Total 6.4 6.0 - 8.0 g/dL    Albumin 3.9 3.5 - 5.0 g/dL    Bilirubin Total 0.5 0.0 - 1.0 mg/dL    GFR Estimate 73 >60 mL/min/1.73m2     Imaging:    No results found.  "

## 2023-07-10 NOTE — LETTER
"    7/10/2023         RE: Livier Bowers  [address]  [facility] [address]        Dear Colleague,    Thank you for referring your patient, Livier Bowers, to the [facility]. Please see a copy of my visit note below.    Oncology Rooming Note    July 10, 2023 2:29 PM   Livier Bowers is a 82 year old female who presents for:    Chief Complaint   Patient presents with     Oncology Clinic Visit     Polycythemia vera     Initial Vitals: BP (!) 148/67   Pulse 86   Temp 98.1  F (36.7  C)   Resp 18   Ht 1.6 m (5' 3\")   Wt 53.9 kg (118 lb 12.8 oz)   SpO2 98%   BMI 21.04 kg/m   Estimated body mass index is 21.04 kg/m  as calculated from the following:    Height as of this encounter: 1.6 m (5' 3\").    Weight as of this encounter: 53.9 kg (118 lb 12.8 oz). Body surface area is 1.55 meters squared.  No Pain (0) Comment: Data Unavailable   No LMP recorded.  Allergies reviewed: Yes  Medications reviewed: Yes    Medications: Medication refills not needed today.  Pharmacy name entered into [id number] [place] [address]    Clinical concerns:  4 month labs       Kristina Murguia              [facility] Hematology and Oncology Progress Note    Patient: Livier Bowers  MRN: [id number]  Date of Service: Jul 10, 2023         Reason for Visit:    Scheduled follow up    Assessment and Plan:    1.           Polycythemia, JAK2 and BCR-ABL negative:     82 year old     Suma presents alone and in good spirits.  She looks good and states that she's been feeling good other than the body aches that accompany her age.  She's tolerating current dosing of Hydrea well and without appreciable side effects.  No signs/symptoms of infection.   Her appetite is good - quite stable weight this past year.  Very stable performance status.  She denies pain, cough, fever, chills, unusual headaches, visual or mentation disturbance, bowel or bladder issues, rash.      CMP - " WNL.    CBC- WBC, PLT and HgB WNL.  Hematocrit slightly decreased @ 43.2%.  MCV elevated reflecting Hydrea compliance.    I reviewed Livier's labs with her.  Her hematocrit is within our goal of 42-45% on the current dosing of Hydrea she's now been on for nearly 2-years.  Her hematocrit has vacillated between 40 and 46% since starting this dose of Hydrea.    Livier is tolerating current Hydrea dosing without incidence.     Continue Hydrea 500 mg/day Monday through Friday with 1000 mg on Saturday and Sunday.    Continue daily aspirin therapy.  She can only tolerate baby aspirin as RS aspirin causes her stomach to feel poorly.  Instructed to take ASA with a meal.     Suma has opted against receiving the SARS-covid vaccine and has never had and doesn't want the seasonal flu vaccine.    4-month f/up with CBC and CMP.  She has requested yearly in-person visits, otherwise virtual with labs in advance.    Hematologic History:     1.           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 Serum EPO was low normal.   ? Polycythemia initially appeared secondary, likely to COPD.  ? Phlebotomy was recommended, but she declined.   ? Sleep study was unremarkable.      Hydroxyurea was initiated at 500 mg daily; however, ineffective and therefore increased to 500 mg twice daily.   ? She then cut back on her smoking and self manipulated the dose of Hydrea to take the least amount possible.   ? 2013, August - she quit smoking completely, switching to the E cigarette.  Hydrea was further decreased.  ? 2013, December - she was down to Hydrea 500 mg every other day and she opted to discontinue it.    2014, July - hematocrit elevated at 50.7 and hemoglobin 16.8.  She would only agree to restarting the Hydrea at 500 mg every other day.   ? Four week follow up in mid-August, 2014, found her Hct responding to low dose Hydrea. Wishing to be on the lowest dose possible of Hydrea she wanted to try  every 3rd day Hydrea, 500 mg, as she was able to be off Hydrea therapy for approximately 7 months before her hemoglobin and hematocrit lorenzo to the lever of being concerning and therapy resumed.     ? 2014, September follow up found the Hct and HgB on the rise. She agreed to return to every other day dosing with Hydrea, 500 mg.    2015, January 19 - agreed to try phlebotomy and Hydrea was discontinued.   ? Prior phlebotomies:    2020 - 03/09, 01/07.    2019 - 01/19, 03/20, 06/12, 09/10, 10/08, 11/05.    2018 - 02/08, 04/25, 06/26, 08/29, 11/06.    2017 - 01/06, 02/01, 03/08, 04/14, 09/21, 11/02, 12/14.    2016 - 10/28, 10/07, 09/21, 05/24, 02/05, 01/22.    2015 - 10/22, 07/22, 05/29, 05/18, 04/06, 03/09, 01/28.    03/09/20 - With onset of iron deficiency, stopped phlebotomies.  Not anemic.  Resumed Hydrea, 500 mg every other day.   ? 03/16/20 Erythropoietin - WNL @ 11.    If EPO low - BM bx.  If EPO high - likely r/t her COPD.    03/23/20 Jak2 exon12, MPL and ADI testing - no mutations identified.     04/20/2020 - Hydrea increased to 500 mg daily (HcT @ 46.8).     12/22/2020 - Hydrea increased to 500 mg Monday - Saturday with 1000 mg on Sunday (HcT @ 45.5).     08/26/2021 - Hydrea increased to 500 mg Monday through Friday with 1000 mg Saturday and Saturday.     ECOG Performance:    0 - Independent    Pain:    Pain Score: No Pain (0)    Encounter Diagnoses:    Problem List Items Addressed This Visit        Hematologic    Polycythemia vera (H) - Primary    Relevant Orders    CBC with platelets and differential    Comprehensive metabolic panel   Other Visit Diagnoses     High risk medication use        Relevant Orders    CBC with platelets and differential    Comprehensive metabolic panel             28 minutes of time were spent on the date of this encounter with chart review, face to face time with the patient, examination, recommendations, documentation, and communication of the plan of care to the care  "team.    Luke Guerrero, [facility]     CC: Va Patten MD   ___________________________________________________________________    Review of Systems:    No fever or night sweats.  No loss of weight.  No lumps or bumps anywhere.  No unusual headaches or eyesight issues.  No dizziness.  No bleeding from the nose.  No sores in the mouth. No problems with swallowing.  No chest pain. No shortness of breath. No cough.  No abdominal pain. No nausea or vomiting.  No diarrhea or constipation.  No blood in stool or black colored stools.  No problems passing urine.  No numbness or tingling in hands or feet.  No skin rashes.    A 14 point review of systems is otherwise negative.    Past History:    Past Medical History:   Diagnosis Date     Osteoporosis      Parathyroid adenoma      Polycythemia vera (H)      Vitamin D deficiency        Past Surgical History:   Procedure Laterality Date     CATARACT EXTRACTION Left      DILATION AND CURETTAGE       KNEE SURGERY      torn meniscus     THYROIDECTOMY, PARTIAL N/A 12/2/2014    Procedure: RIGHT PARATHYROID EXCISION ;  Surgeon: Tressa Louis MD;  Location: [facility];  Service:      Physical Exam:    BP (!) 148/67   Pulse 86   Temp 98.1  F (36.7  C)   Resp 18   Ht 1.6 m (5' 3\")   Wt 53.9 kg (118 lb 12.8 oz)   SpO2 98%   BMI 21.04 kg/m      GENERAL:   Very pleasant.  Alert and oriented. Seated comfortably. In no distress.    HEENT:   Atraumatic and normocephalic.  RAVINDER.  EOMI.  No pallor.  No icterus.  No mucosal lesions.    LYMPH NODES:  No palpable cervical, axillary or inguinal lymphadenopathy.    CHEST:   Lungs clear to auscultation bilaterally.    ABDOMEN:  Soft.  Not tender or distended.  No organomegaly.    CVS:    S1 and S2 heard.  Regular rate and rhythm.  No murmur, gallop or rub heard.  No peripheral edema.    EXTREMITIES:  Warm.    SKIN:    No rash, bruising or purpura noted.  Full head of hair.    Lab Results:    Reviewed with patient.    Recent " Results (from the past 168 hour(s))   CBC with platelets   Result Value Ref Range    WBC Count 5.8 4.0 - 11.0 10e3/uL    RBC Count 3.87 3.80 - 5.20 10e6/uL    Hemoglobin 14.4 11.7 - 15.7 g/dL    Hematocrit 43.2 35.0 - 47.0 %     (H) 78 - 100 fL    MCH 37.2 (H) 26.5 - 33.0 pg    MCHC 33.3 31.5 - 36.5 g/dL    RDW 13.0 10.0 - 15.0 %    Platelet Count 237 150 - 450 10e3/uL   Comprehensive metabolic panel   Result Value Ref Range    Sodium 141 136 - 145 mmol/L    Potassium 4.1 3.5 - 5.0 mmol/L    Chloride 107 98 - 107 mmol/L    Carbon Dioxide (CO2) 25 22 - 31 mmol/L    Anion Gap 9 5 - 18 mmol/L    Urea Nitrogen 15 8 - 28 mg/dL    Creatinine 0.81 0.60 - 1.10 mg/dL    Calcium 9.0 8.5 - 10.5 mg/dL    Glucose 98 70 - 125 mg/dL    Alkaline Phosphatase 60 45 - 120 U/L    AST 19 0 - 40 U/L    ALT 14 0 - 45 U/L    Protein Total 6.4 6.0 - 8.0 g/dL    Albumin 3.9 3.5 - 5.0 g/dL    Bilirubin Total 0.5 0.0 - 1.0 mg/dL    GFR Estimate 73 >60 mL/min/1.73m2     Imaging:    No results found.      Again, thank you for allowing me to participate in the care of your patient.        Sincerely,        Luke Guerrero, CNP

## 2023-07-10 NOTE — PROGRESS NOTES
"Oncology Rooming Note    July 10, 2023 2:29 PM   Livier Bowers is a 82 year old female who presents for:    Chief Complaint   Patient presents with     Oncology Clinic Visit     Polycythemia vera     Initial Vitals: BP (!) 148/67   Pulse 86   Temp 98.1  F (36.7  C)   Resp 18   Ht 1.6 m (5' 3\")   Wt 53.9 kg (118 lb 12.8 oz)   SpO2 98%   BMI 21.04 kg/m   Estimated body mass index is 21.04 kg/m  as calculated from the following:    Height as of this encounter: 1.6 m (5' 3\").    Weight as of this encounter: 53.9 kg (118 lb 12.8 oz). Body surface area is 1.55 meters squared.  No Pain (0) Comment: Data Unavailable   No LMP recorded.  Allergies reviewed: Yes  Medications reviewed: Yes    Medications: Medication refills not needed today.  Pharmacy name entered into [id number] [place] [address]    Clinical concerns:  4 month labs       Kristina Murguia            "

## 2023-07-10 NOTE — PATIENT INSTRUCTIONS
Recent Results (from the past 24 hour(s))   CBC with platelets    Collection Time: 07/10/23  1:52 PM   Result Value Ref Range    WBC Count 5.8 4.0 - 11.0 10e3/uL    RBC Count 3.87 3.80 - 5.20 10e6/uL    Hemoglobin 14.4 11.7 - 15.7 g/dL    Hematocrit 43.2 35.0 - 47.0 %     (H) 78 - 100 fL    MCH 37.2 (H) 26.5 - 33.0 pg    MCHC 33.3 31.5 - 36.5 g/dL    RDW 13.0 10.0 - 15.0 %    Platelet Count 237 150 - 450 10e3/uL   Comprehensive metabolic panel    Collection Time: 07/10/23  1:52 PM   Result Value Ref Range    Sodium 141 136 - 145 mmol/L    Potassium 4.1 3.5 - 5.0 mmol/L    Chloride 107 98 - 107 mmol/L    Carbon Dioxide (CO2) 25 22 - 31 mmol/L    Anion Gap 9 5 - 18 mmol/L    Urea Nitrogen 15 8 - 28 mg/dL    Creatinine 0.81 0.60 - 1.10 mg/dL    Calcium 9.0 8.5 - 10.5 mg/dL    Glucose 98 70 - 125 mg/dL    Alkaline Phosphatase 60 45 - 120 U/L    AST 19 0 - 40 U/L    ALT 14 0 - 45 U/L    Protein Total 6.4 6.0 - 8.0 g/dL    Albumin 3.9 3.5 - 5.0 g/dL    Bilirubin Total 0.5 0.0 - 1.0 mg/dL    GFR Estimate 73 >60 mL/min/1.73m2

## 2023-08-11 ASSESSMENT — ENCOUNTER SYMPTOMS
PARESTHESIAS: 0
DIZZINESS: 0
NAUSEA: 0
MYALGIAS: 1
CONSTIPATION: 0
SORE THROAT: 0
CHILLS: 0
FEVER: 0
WEAKNESS: 1
EYE PAIN: 0
NERVOUS/ANXIOUS: 1
HEADACHES: 0
JOINT SWELLING: 0
ABDOMINAL PAIN: 0
DYSURIA: 0
COUGH: 0
HEARTBURN: 0
HEMATURIA: 0
BREAST MASS: 0
PALPITATIONS: 0
ARTHRALGIAS: 1
DIARRHEA: 0
HEMATOCHEZIA: 0
SHORTNESS OF BREATH: 0
FREQUENCY: 0

## 2023-08-11 ASSESSMENT — ACTIVITIES OF DAILY LIVING (ADL): CURRENT_FUNCTION: NO ASSISTANCE NEEDED

## 2023-08-12 ENCOUNTER — HEALTH MAINTENANCE LETTER (OUTPATIENT)
Age: 83
End: 2023-08-12

## 2023-08-14 ENCOUNTER — OFFICE VISIT (OUTPATIENT)
Dept: FAMILY MEDICINE | Facility: CLINIC | Age: 83
End: 2023-08-14
Payer: COMMERCIAL

## 2023-08-14 VITALS
OXYGEN SATURATION: 99 % | SYSTOLIC BLOOD PRESSURE: 133 MMHG | WEIGHT: 119.2 LBS | DIASTOLIC BLOOD PRESSURE: 79 MMHG | TEMPERATURE: 98 F | BODY MASS INDEX: 21.12 KG/M2 | HEART RATE: 90 BPM | HEIGHT: 63 IN

## 2023-08-14 DIAGNOSIS — E55.9 VITAMIN D DEFICIENCY: ICD-10-CM

## 2023-08-14 DIAGNOSIS — I73.9 CLAUDICATION OF BOTH LOWER EXTREMITIES (H): ICD-10-CM

## 2023-08-14 DIAGNOSIS — R73.01 ELEVATED FASTING BLOOD SUGAR: ICD-10-CM

## 2023-08-14 DIAGNOSIS — M81.0 SENILE OSTEOPOROSIS: ICD-10-CM

## 2023-08-14 DIAGNOSIS — Z00.00 MEDICARE ANNUAL WELLNESS VISIT, SUBSEQUENT: Primary | ICD-10-CM

## 2023-08-14 DIAGNOSIS — Z00.00 ENCOUNTER FOR MEDICARE ANNUAL WELLNESS EXAM: ICD-10-CM

## 2023-08-14 LAB
ANION GAP SERPL CALCULATED.3IONS-SCNC: 12 MMOL/L (ref 7–15)
BUN SERPL-MCNC: 15.2 MG/DL (ref 8–23)
CALCIUM SERPL-MCNC: 9 MG/DL (ref 8.8–10.2)
CHLORIDE SERPL-SCNC: 105 MMOL/L (ref 98–107)
CREAT SERPL-MCNC: 0.74 MG/DL (ref 0.51–0.95)
DEPRECATED HCO3 PLAS-SCNC: 25 MMOL/L (ref 22–29)
GFR SERPL CREATININE-BSD FRML MDRD: 81 ML/MIN/1.73M2
GLUCOSE SERPL-MCNC: 95 MG/DL (ref 70–99)
HBA1C MFR BLD: 5.6 % (ref 0–5.6)
POTASSIUM SERPL-SCNC: 4 MMOL/L (ref 3.4–5.3)
SODIUM SERPL-SCNC: 142 MMOL/L (ref 136–145)

## 2023-08-14 PROCEDURE — 83036 HEMOGLOBIN GLYCOSYLATED A1C: CPT | Performed by: FAMILY MEDICINE

## 2023-08-14 PROCEDURE — 36415 COLL VENOUS BLD VENIPUNCTURE: CPT | Performed by: FAMILY MEDICINE

## 2023-08-14 PROCEDURE — 99203 OFFICE O/P NEW LOW 30 MIN: CPT | Mod: 25 | Performed by: FAMILY MEDICINE

## 2023-08-14 PROCEDURE — G0439 PPPS, SUBSEQ VISIT: HCPCS | Performed by: FAMILY MEDICINE

## 2023-08-14 PROCEDURE — 80048 BASIC METABOLIC PNL TOTAL CA: CPT | Performed by: FAMILY MEDICINE

## 2023-08-14 PROCEDURE — 82306 VITAMIN D 25 HYDROXY: CPT | Performed by: FAMILY MEDICINE

## 2023-08-14 ASSESSMENT — ENCOUNTER SYMPTOMS
DIARRHEA: 0
MYALGIAS: 1
SHORTNESS OF BREATH: 0
DIZZINESS: 0
SORE THROAT: 0
CHILLS: 0
NERVOUS/ANXIOUS: 1
FEVER: 0
JOINT SWELLING: 0
ARTHRALGIAS: 1
COUGH: 0
FREQUENCY: 0
WEAKNESS: 1
HEADACHES: 0
BREAST MASS: 0
HEARTBURN: 0
NAUSEA: 0
HEMATURIA: 0
PALPITATIONS: 0
HEMATOCHEZIA: 0
DYSURIA: 0
CONSTIPATION: 0
ABDOMINAL PAIN: 0
EYE PAIN: 0
PARESTHESIAS: 0

## 2023-08-14 ASSESSMENT — ACTIVITIES OF DAILY LIVING (ADL): CURRENT_FUNCTION: NO ASSISTANCE NEEDED

## 2023-08-14 ASSESSMENT — PAIN SCALES - GENERAL: PAINLEVEL: NO PAIN (0)

## 2023-08-14 NOTE — PROGRESS NOTES
"SUBJECTIVE:   Livier is a 82 year old who presents for Preventive Visit.      8/14/2023     1:26 PM   Additional Questions   Roomed by [facility], Visit Facilitator       Are you in the first 12 months of your Medicare coverage?  No    Healthy Habits:     In general, how would you rate your overall health?  Good    Frequency of exercise:  2-3 days/week    Duration of exercise:  15-30 minutes    Do you usually eat at least 4 servings of fruit and vegetables a day, include whole grains    & fiber and avoid regularly eating high fat or \"junk\" foods?  Yes    Ability to successfully perform activities of daily living:  No assistance needed    Home Safety:  No safety concerns identified    Hearing Impairment:  No hearing concerns    In the past 6 months, have you been bothered by leaking of urine?  No    In general, how would you rate your overall mental or emotional health?  Excellent    Additional concerns today:  No      Have you ever done Advance Care Planning? (For example, a Health Directive, POLST, or a discussion with a medical provider or your loved ones about your wishes): Yes, advance care planning is on file.       Fall risk  Fallen 2 or more times in the past year?: No  Any fall with injury in the past year?: No    Cognitive Screening   1) Repeat 3 items (Leader, Season, Table)    2) Clock draw: NORMAL  3) 3 item recall: Recalls 3 objects  Results: 3 items recalled: COGNITIVE IMPAIRMENT LESS LIKELY    Mini-CogTM Copyright S Brian. Licensed by the author for use in [facility]; reprinted with permission (chucky@.[facility]). All rights reserved.      Do you have sleep apnea, excessive snoring or daytime drowsiness? : no    Reviewed and updated as needed this visit by clinical staff   Tobacco  Allergies  Meds              Reviewed and updated as needed this visit by Provider                 Social History     Tobacco Use     Smoking status: Former     Types: Cigarettes     Quit date: 2/1/2014     Years " since quittin.5     Smokeless tobacco: Never     Tobacco comments:     E-cigs  10/day   Substance Use Topics     Alcohol use: Yes     Alcohol/week: 0.0 standard drinks of alcohol             2023     3:05 PM   Alcohol Use   Prescreen: >3 drinks/day or >7 drinks/week? No          No data to display              Do you have a current opioid prescription? No  Do you use any other controlled substances or medications that are not prescribed by a provider? None          Osteoporosis: Patient had a DEXA done in 2019 that showed T score of -3.3 lumbar spine, -4.4 in the left hip and -3.5 in the right hip.  She was tried on a medicine such as Fosamax, but did have side effects to the point that she need to discontinue the medication.    Claudications: Patient has pain in bilateral upper leg muscles when walking.  This pain is quickly relieved with rest.  Patient did smoke for many years, quit about 9 years ago, but still sneaks cigarettes every once in a while.    Current providers sharing in care for this patient include:   Patient Care Team:  Va Patten MD as PCP - General (Family Practice)  Italo Ratliff MD, MD as MD (Hematology & Oncology)  Luke Guerrero CNP as Assigned Cancer Care Provider  Elena Mcneil RN as Specialty Care Coordinator (Hematology & Oncology)  Deirdre Diaz MD as MD (Hematology & Oncology)    The following health maintenance items are reviewed in Epic and correct as of today:  Health Maintenance   Topic Date Due     ANNUAL REVIEW OF HM ORDERS  Never done     ADVANCE CARE PLANNING  Never done     COVID-19 Vaccine (1) Never done     ZOSTER IMMUNIZATION (1 of 2) Never done     Pneumococcal Vaccine: 65+ Years (2 - PCV) 2001     DTAP/TDAP/TD IMMUNIZATION (1 - Tdap) 2010     MEDICARE ANNUAL WELLNESS VISIT  2020     FALL RISK ASSESSMENT  2024     LIPID  2024     DEXA  2034     PHQ-2 (once per calendar year)  Completed     IPV IMMUNIZATION  Aged Out  "    MENINGITIS IMMUNIZATION  Aged Out     INFLUENZA VACCINE  Discontinued           Mammogram Screening - Mammography discussed and declined  Pertinent mammograms are reviewed under the imaging tab.    Review of Systems   Constitutional:  Negative for chills and fever.   HENT:  Negative for congestion, ear pain, hearing loss and sore throat.    Eyes:  Negative for pain and visual disturbance.   Respiratory:  Negative for cough and shortness of breath.    Cardiovascular:  Negative for chest pain, palpitations and peripheral edema.   Gastrointestinal:  Negative for abdominal pain, constipation, diarrhea, heartburn, hematochezia and nausea.   Breasts:  Negative for tenderness, breast mass and discharge.   Genitourinary:  Negative for dysuria, frequency, genital sores, hematuria, pelvic pain, urgency, vaginal bleeding and vaginal discharge.   Musculoskeletal:  Positive for arthralgias and myalgias. Negative for joint swelling.   Skin:  Negative for rash.   Neurological:  Positive for weakness. Negative for dizziness, headaches and paresthesias.   Psychiatric/Behavioral:  Negative for mood changes. The patient is nervous/anxious.          OBJECTIVE:   BP (!) 145/80 (BP Location: Left arm, Patient Position: Sitting, Cuff Size: Adult Regular)   Pulse 90   Temp 98  F (36.7  C) (Oral)   Ht 1.6 m (5' 2.99\")   Wt 54.1 kg (119 lb 3.2 oz)   SpO2 99%   BMI 21.12 kg/m   Estimated body mass index is 21.12 kg/m  as calculated from the following:    Height as of this encounter: 1.6 m (5' 2.99\").    Weight as of this encounter: 54.1 kg (119 lb 3.2 oz).  Physical Exam  GENERAL: healthy, alert and no distress  RESP: lungs clear to auscultation - no rales, rhonchi or wheezes  CV: regular rate and rhythm, normal S1 S2, no S3 or S4, no murmur, click or rub, no peripheral edema and peripheral pulses strong  PSYCH: anxious    Diagnostic Test Results:  Labs reviewed in Epic    ASSESSMENT / PLAN:   1. Medicare annual wellness visit, " subsequent  Advised healthy lifestyle.  Patient declines immunizations.    2. Postmenopausal Osteoporosis  Check DEXA and notify with results.  - DX Hip/Pelvis/Spine; Future    3. Elevated fasting blood sugar  Check labs and notify with results.  - Hemoglobin A1c; Future  - Basic metabolic panel; Future    4. Claudication of both lower extremities (H)  Check ultrasound and notify with results.  - US Lower Extremity Arterial Duplex Bilateral; Future    5. Vitamin D Deficiency  Check labs and notify with results.  Continue vitamin D supplementation for now.  - Vitamin D Deficiency; Future        Patient has been advised of split billing requirements and indicates understanding: Yes    COUNSELING:  Reviewed preventive health counseling, as reflected in patient instructions       Regular exercise       Healthy diet/nutrition    She reports that she quit smoking about 9 years ago. Her smoking use included cigarettes. She has never used smokeless tobacco.      Appropriate preventive services were discussed with this patient, including applicable screening as appropriate for cardiovascular disease, diabetes, osteopenia/osteoporosis, and glaucoma.  As appropriate for age/gender, discussed screening for colorectal cancer, prostate cancer, breast cancer, and cervical cancer. Checklist reviewing preventive services available has been given to the patient.    Reviewed patients plan of care and provided an AVS. The Basic Care Plan (routine screening as documented in Health Maintenance) for Livier meets the Care Plan requirement. This Care Plan has been established and reviewed with the Patient.          Matthew Moon MD  [facility]    Identified Health Risks:  I have reviewed Opioid Use Disorder and Substance Use Disorder risk factors and made any needed referrals.

## 2023-08-14 NOTE — PATIENT INSTRUCTIONS
Patient Education   Personalized Prevention Plan  You are due for the preventive services outlined below.  Your care team is available to assist you in scheduling these services.  If you have already completed any of these items, please share that information with your care team to update in your medical record.  Health Maintenance Due   Topic Date Due     COVID-19 Vaccine (1) Never done     Zoster (Shingles) Vaccine (1 of 2) Never done     Pneumococcal Vaccine (2 - PCV) 02/25/2001     Diptheria Tetanus Pertussis (DTAP/TDAP/TD) Vaccine (1 - Tdap) 02/05/2010

## 2023-08-15 LAB — DEPRECATED CALCIDIOL+CALCIFEROL SERPL-MC: 45 UG/L (ref 20–75)

## 2023-08-22 ENCOUNTER — HOSPITAL ENCOUNTER (OUTPATIENT)
Dept: ULTRASOUND IMAGING | Facility: CLINIC | Age: 83
Discharge: HOME | End: 2023-08-22
Attending: FAMILY MEDICINE | Admitting: FAMILY MEDICINE
Payer: COMMERCIAL

## 2023-08-22 ENCOUNTER — ANCILLARY PROCEDURE (OUTPATIENT)
Dept: BONE DENSITY | Facility: CLINIC | Age: 83
End: 2023-08-22
Attending: FAMILY MEDICINE
Payer: COMMERCIAL

## 2023-08-22 DIAGNOSIS — M81.0 SENILE OSTEOPOROSIS: ICD-10-CM

## 2023-08-22 DIAGNOSIS — I73.9 CLAUDICATION OF BOTH LOWER EXTREMITIES (H): ICD-10-CM

## 2023-08-22 PROCEDURE — 77080 DXA BONE DENSITY AXIAL: CPT | Mod: TC | Performed by: PHYSICIAN ASSISTANT

## 2023-08-22 PROCEDURE — 93924 LWR XTR VASC STDY BILAT: CPT

## 2023-08-24 ENCOUNTER — TELEPHONE (OUTPATIENT)
Dept: FAMILY MEDICINE | Facility: CLINIC | Age: 83
End: 2023-08-24
Payer: COMMERCIAL

## 2023-08-24 NOTE — TELEPHONE ENCOUNTER
----- Message from Matthew Moon MD sent at 8/24/2023  7:10 AM CDT -----  Please notify patient that she has significant osteoporosis, which means she is at high risk for breaking bones.  Please have her schedule appointment so we can review treatment options together.    Matthew Moon MD

## 2023-08-24 NOTE — TELEPHONE ENCOUNTER
Called and spoke with patient regarding provider's messages. Patient has no further questions or concerns. Scheduled a follow up visit to discuss further.  Future Appointments 8/24/2023 - 2/20/2024        Date Visit Type Length Department Provider     9/1/2023  3:30 PM OFFICE VISIT 30 min [facility] Matthew Moon MD    Location Instructions:     [facility] is located at [address] in [place]. This is one mile south of the [address] exit off of [address]. From [facility], turn west on [address], then north on [address] to access the parking lot.                   Area Flaa-, [facility], August 24, 2023, 10:31 AM

## 2023-08-24 NOTE — TELEPHONE ENCOUNTER
----- Message from Matthew Moon MD sent at 8/24/2023  7:10 AM CDT -----  Please notify patient that the blood flow in her legs is normal.    Matthew Moon MD

## 2023-09-01 ENCOUNTER — OFFICE VISIT (OUTPATIENT)
Dept: FAMILY MEDICINE | Facility: CLINIC | Age: 83
End: 2023-09-01
Payer: COMMERCIAL

## 2023-09-01 VITALS
DIASTOLIC BLOOD PRESSURE: 81 MMHG | TEMPERATURE: 98 F | HEART RATE: 98 BPM | BODY MASS INDEX: 21.14 KG/M2 | SYSTOLIC BLOOD PRESSURE: 137 MMHG | OXYGEN SATURATION: 98 % | WEIGHT: 119.3 LBS | HEIGHT: 63 IN

## 2023-09-01 DIAGNOSIS — M51.369 DDD (DEGENERATIVE DISC DISEASE), LUMBAR: ICD-10-CM

## 2023-09-01 DIAGNOSIS — M16.0 PRIMARY OSTEOARTHRITIS OF BOTH HIPS: ICD-10-CM

## 2023-09-01 DIAGNOSIS — M81.0 AGE-RELATED OSTEOPOROSIS WITHOUT CURRENT PATHOLOGICAL FRACTURE: Primary | ICD-10-CM

## 2023-09-01 PROCEDURE — 99214 OFFICE O/P EST MOD 30 MIN: CPT | Performed by: FAMILY MEDICINE

## 2023-09-01 ASSESSMENT — PAIN SCALES - GENERAL: PAINLEVEL: NO PAIN (0)

## 2023-09-01 NOTE — PROGRESS NOTES
Assessment & Plan     Age-related osteoporosis without current pathological fracture  New problem me.  Discussed medication options.  Suggested that she may need Reclast given her advanced degenerative disc disease.  Also offered Prolia if she wants to use a different class of medication.  She will think about medication options and message us when she has decided which medicine sounds best to her.    DDD (degenerative disc disease), lumbar  Offered referral to spine specialist, but she declines.  She will continue to monitor symptoms.    Primary osteoarthritis of both hips  Offered referral to orthopedic, but she declines.  She will continue monitor symptoms and use cane to help ambulate.                 Matthew Moon MD  [facility]    Yonatan Maier is a 82 year old, presenting for the following health issues:  Follow Up (2 week follow up, back pain, discuss lab results)        9/1/2023     3:05 PM   Additional Questions   Roomed by Nae Barreto, Visit Facilitator       History of Present Illness       Back Pain:  She presents for follow up of back pain. Patient's back pain is a chronic problem.  Location of back pain:  Left lower back, left buttock and left hip  Description of back pain: dull ache and stabbing  Back pain spreads: left thigh    Since patient first noticed back pain, pain is: gradually worsening  Does back pain interfere with her job:  No       She eats 4 or more servings of fruits and vegetables daily.She consumes 1 sweetened beverage(s) daily.She exercises with enough effort to increase her heart rate 10 to 19 minutes per day.    She is taking medications regularly.     Osteoporosis: Patient was on Fosamax about 4 to 5 years ago she took it for 6 months but discontinued because it was causing more pain for her.  Reviewed her bone density results with patient:  -Lumbar Spine: L1-L2: BMD: 0.718 g/cm2. T-score: -3.7. Z-score: -1.5. L3-L4 omitted from lumbar spine  "due to greater than 1.0 T-score difference from adjacent vertebrae. This is likely due to degenerative changes, which may artifactually increase BMD.  -RIGHT Hip Total: BMD: 0.555 g/cm2. T-score: -3.6. Z-score: -1.3.  -RIGHT Hip Femoral neck: BMD: 0.788 g/cm2. T-score: -1.8. Z-score: 0.6.  -LEFT Hip Total: BMD: 0.420 g/cm2. T-score: -4.7. Z-score: -2.4.  -LEFT Hip Femoral neck: BMD: 0.552 g/cm2. T-score: -3.5. Z-score: -1.1.    Degenerative disc disease: Patient has advanced degenerative disc disease of lumbar spine.  She complains of aching and pain in her lumbar spine that is made worse with walking on uneven surfaces.    Osteoarthritis bilateral hips: Patient has significant degenerative disc disease of bilateral hips.  She is not interested in surgical repair. She walks with a cane.              Review of Systems   No chest pain      Objective    /81   Pulse 98   Temp 98  F (36.7  C) (Oral)   Ht 1.6 m (5' 2.99\")   Wt 54.1 kg (119 lb 4.8 oz)   SpO2 98%   BMI 21.14 kg/m    Body mass index is 21.14 kg/m .  Physical Exam   GENERAL: healthy, alert and no distress  PSYCH: mentation appears normal, affect normal/bright                      "

## 2023-11-28 ENCOUNTER — LAB (OUTPATIENT)
Dept: INFUSION THERAPY | Facility: CLINIC | Age: 83
End: 2023-11-28
Attending: NURSE PRACTITIONER
Payer: COMMERCIAL

## 2023-11-28 ENCOUNTER — ONCOLOGY VISIT (OUTPATIENT)
Dept: ONCOLOGY | Facility: CLINIC | Age: 83
End: 2023-11-28
Attending: NURSE PRACTITIONER
Payer: COMMERCIAL

## 2023-11-28 VITALS
BODY MASS INDEX: 20.94 KG/M2 | OXYGEN SATURATION: 97 % | RESPIRATION RATE: 18 BRPM | DIASTOLIC BLOOD PRESSURE: 77 MMHG | HEIGHT: 63 IN | SYSTOLIC BLOOD PRESSURE: 160 MMHG | WEIGHT: 118.2 LBS | HEART RATE: 88 BPM

## 2023-11-28 DIAGNOSIS — D45 POLYCYTHEMIA VERA (H): ICD-10-CM

## 2023-11-28 DIAGNOSIS — Z79.899 HIGH RISK MEDICATION USE: ICD-10-CM

## 2023-11-28 DIAGNOSIS — D45 POLYCYTHEMIA VERA (H): Primary | ICD-10-CM

## 2023-11-28 LAB
ALBUMIN SERPL BCG-MCNC: 4.2 G/DL (ref 3.5–5.2)
ALP SERPL-CCNC: 58 U/L (ref 40–150)
ALT SERPL W P-5'-P-CCNC: 6 U/L (ref 0–50)
ANION GAP SERPL CALCULATED.3IONS-SCNC: 9 MMOL/L (ref 7–15)
AST SERPL W P-5'-P-CCNC: 18 U/L (ref 0–45)
BASOPHILS # BLD AUTO: 0.1 10E3/UL (ref 0–0.2)
BASOPHILS NFR BLD AUTO: 1 %
BILIRUB SERPL-MCNC: 0.4 MG/DL
BUN SERPL-MCNC: 18.8 MG/DL (ref 8–23)
CALCIUM SERPL-MCNC: 9.2 MG/DL (ref 8.8–10.2)
CHLORIDE SERPL-SCNC: 107 MMOL/L (ref 98–107)
CREAT SERPL-MCNC: 0.88 MG/DL (ref 0.51–0.95)
DEPRECATED HCO3 PLAS-SCNC: 29 MMOL/L (ref 22–29)
EGFRCR SERPLBLD CKD-EPI 2021: 66 ML/MIN/1.73M2
EOSINOPHIL # BLD AUTO: 0.1 10E3/UL (ref 0–0.7)
EOSINOPHIL NFR BLD AUTO: 1 %
ERYTHROCYTE [DISTWIDTH] IN BLOOD BY AUTOMATED COUNT: 13.2 % (ref 10–15)
GLUCOSE SERPL-MCNC: 106 MG/DL (ref 70–99)
HCT VFR BLD AUTO: 43.7 % (ref 35–47)
HGB BLD-MCNC: 14.7 G/DL (ref 11.7–15.7)
IMM GRANULOCYTES # BLD: 0 10E3/UL
IMM GRANULOCYTES NFR BLD: 0 %
LYMPHOCYTES # BLD AUTO: 1.7 10E3/UL (ref 0.8–5.3)
LYMPHOCYTES NFR BLD AUTO: 27 %
MCH RBC QN AUTO: 37.9 PG (ref 26.5–33)
MCHC RBC AUTO-ENTMCNC: 33.6 G/DL (ref 31.5–36.5)
MCV RBC AUTO: 113 FL (ref 78–100)
MONOCYTES # BLD AUTO: 0.6 10E3/UL (ref 0–1.3)
MONOCYTES NFR BLD AUTO: 9 %
NEUTROPHILS # BLD AUTO: 4 10E3/UL (ref 1.6–8.3)
NEUTROPHILS NFR BLD AUTO: 62 %
NRBC # BLD AUTO: 0 10E3/UL
NRBC BLD AUTO-RTO: 0 /100
PLATELET # BLD AUTO: 230 10E3/UL (ref 150–450)
POTASSIUM SERPL-SCNC: 4.3 MMOL/L (ref 3.4–5.3)
PROT SERPL-MCNC: 6.4 G/DL (ref 6.4–8.3)
RBC # BLD AUTO: 3.88 10E6/UL (ref 3.8–5.2)
SODIUM SERPL-SCNC: 145 MMOL/L (ref 135–145)
WBC # BLD AUTO: 6.4 10E3/UL (ref 4–11)

## 2023-11-28 PROCEDURE — 36415 COLL VENOUS BLD VENIPUNCTURE: CPT

## 2023-11-28 PROCEDURE — 80053 COMPREHEN METABOLIC PANEL: CPT

## 2023-11-28 PROCEDURE — 85025 COMPLETE CBC W/AUTO DIFF WBC: CPT

## 2023-11-28 PROCEDURE — 99214 OFFICE O/P EST MOD 30 MIN: CPT | Performed by: NURSE PRACTITIONER

## 2023-11-28 PROCEDURE — G0463 HOSPITAL OUTPT CLINIC VISIT: HCPCS | Performed by: NURSE PRACTITIONER

## 2023-11-28 ASSESSMENT — PAIN SCALES - GENERAL: PAINLEVEL: NO PAIN (0)

## 2023-11-28 NOTE — PROGRESS NOTES
"Oncology Rooming Note    November 28, 2023 2:05 PM   Livier Bowers is a 82 year old female who presents for:    Chief Complaint   Patient presents with    Oncology Clinic Visit     Polycythemia vera     Initial Vitals: BP (!) 160/77   Pulse 88   Resp 18   Ht 1.6 m (5' 3\")   Wt 53.6 kg (118 lb 3.2 oz)   SpO2 97%   BMI 20.94 kg/m   Estimated body mass index is 20.94 kg/m  as calculated from the following:    Height as of this encounter: 1.6 m (5' 3\").    Weight as of this encounter: 53.6 kg (118 lb 3.2 oz). Body surface area is 1.54 meters squared.  No Pain (0) Comment: Data Unavailable   No LMP recorded. Patient is postmenopausal.  Allergies reviewed: Yes  Medications reviewed: Yes    Medications: Medication refills not needed today.  Pharmacy name entered into [facility]: [id number] [place] [address]    Clinical concerns:  4 month labs       Kristina Murguia            "

## 2023-11-28 NOTE — LETTER
"    11/28/2023         RE: Livier Bowers  [address]  [facility] [address]        Dear Colleague,    Thank you for referring your patient, Livier Bowers, to the [facility]. Please see a copy of my visit note below.    Oncology Rooming Note    November 28, 2023 2:05 PM   Livier Bowers is a 82 year old female who presents for:    Chief Complaint   Patient presents with     Oncology Clinic Visit     Polycythemia vera     Initial Vitals: BP (!) 160/77   Pulse 88   Resp 18   Ht 1.6 m (5' 3\")   Wt 53.6 kg (118 lb 3.2 oz)   SpO2 97%   BMI 20.94 kg/m   Estimated body mass index is 20.94 kg/m  as calculated from the following:    Height as of this encounter: 1.6 m (5' 3\").    Weight as of this encounter: 53.6 kg (118 lb 3.2 oz). Body surface area is 1.54 meters squared.  No Pain (0) Comment: Data Unavailable   No LMP recorded. Patient is postmenopausal.  Allergies reviewed: Yes  Medications reviewed: Yes    Medications: Medication refills not needed today.  Pharmacy name entered into [facility]: [id number] [place] [address]    Clinical concerns:  4 month labs       Kristina Murguia              [facility] Hematology and Oncology Progress Note    Patient: Livier Bowers  MRN: [id number]  Date of Service: 11/28/2023        Reason for Visit    Chief Complaint   Patient presents with     Oncology Clinic Visit     Polycythemia vera       Assessment and Plan     Cancer Staging   No matching staging information was found for the patient.    Psych anemia, JAK2 and BCR/ABL negative: Patient continues on Hydrea.  She states that she is tolerating it quite well.  She is taking 500 mg daily on Monday through Friday and 1000 mg on the weekends.  Our goal is to keep her hematocrit in between 42-45%.  Been running around that range for a while.  Patient is requesting to only be seen once a year.  I did tell her it is important to continue to get her labs done " frequently which she was agreeable to.  We will do her labs every 4 months and she will be seen once a year.  Changes to her meds currently.  She will also continue baby aspirin.    ECOG Performance    0 - Independent    Distress Screening (within last 30 days)    No data recorded     Pain  Pain Score: No Pain (0)    Problem List    Patient Active Problem List   Diagnosis     Polycythemia vera (H)     Pain In The Arms     Vitamin D Deficiency     Adenoma Of The Parathyroid Gland     Age-related osteoporosis without current pathological fracture     Rotator cuff tear arthropathy of right shoulder     Anxiety     DDD (degenerative disc disease), lumbar     Primary osteoarthritis of both hips        ______________________________________________________________________________    History of Present Illness    Hematologist: Dr. Diaz    Diagnosis: Polycythemia, JAK2 and BCR-ABL negative. Presented with hemoglobin 18 and hematocrit of 53. Likely secondary to smoking/COPD. Sleep study unremarkable. Declined phlebotomy.     Treatment: Hydrea. Initially at 500mg daily. Then increased to 1000mg daily. Pt has been somewhat noncompliant on and off. Has decreased dose and discontinued at times.   -January 2015: wanted to stop hydrea and started phlebotomies.   -March 2020: has iron deficiency so stopped phlebotomies and resumed hydrea. 500mg every other day.   [id number]: Slowly increased dosage to keep hematocrit under 45  -August 2021: increased to 500mg daily Monday through Friday and 1000mg on Sat, Sun. Continues on that dosage    Interim history: Patient is here today for a 4-month follow-up visit.  That she continues to be compliant with her Hydrea.  She says she does sometimes forget some dosages on the weekends but overall she is very good about taking it.  She has not noticed any side effects.  She denies any new bone or back pain.  Denies any swelling in her legs.  Denies any itching.  Denies any neurological changes.   "Denies any infectious complaints.      Review of Systems      Pertinent items are noted in HPI.    Past History    Past Medical History:   Diagnosis Date     Osteoporosis      Parathyroid adenoma      Polycythemia vera (H)      Vitamin D deficiency        PHYSICAL EXAM  BP (!) 160/77   Pulse 88   Resp 18   Ht 1.6 m (5' 3\")   Wt 53.6 kg (118 lb 3.2 oz)   SpO2 97%   BMI 20.94 kg/m      GENERAL: no acute distress. Cooperative in conversation. Alone in clinic  RESP: Regular respiratory rate. No expiratory wheezes   NEURO: non focal. Alert and oriented x3.   PSYCH: within normal limits. No depression or anxiety.  SKIN: exposed skin is dry intact.     Lab Results    Recent Results (from the past 168 hour(s))   Comprehensive metabolic panel   Result Value Ref Range    Sodium 145 135 - 145 mmol/L    Potassium 4.3 3.4 - 5.3 mmol/L    Carbon Dioxide (CO2) 29 22 - 29 mmol/L    Anion Gap 9 7 - 15 mmol/L    Urea Nitrogen 18.8 8.0 - 23.0 mg/dL    Creatinine 0.88 0.51 - 0.95 mg/dL    GFR Estimate 66 >60 mL/min/1.73m2    Calcium 9.2 8.8 - 10.2 mg/dL    Chloride 107 98 - 107 mmol/L    Glucose 106 (H) 70 - 99 mg/dL    Alkaline Phosphatase 58 40 - 150 U/L    AST 18 0 - 45 U/L    ALT 6 0 - 50 U/L    Protein Total 6.4 6.4 - 8.3 g/dL    Albumin 4.2 3.5 - 5.2 g/dL    Bilirubin Total 0.4 <=1.2 mg/dL   CBC with platelets and differential   Result Value Ref Range    WBC Count 6.4 4.0 - 11.0 10e3/uL    RBC Count 3.88 3.80 - 5.20 10e6/uL    Hemoglobin 14.7 11.7 - 15.7 g/dL    Hematocrit 43.7 35.0 - 47.0 %     (H) 78 - 100 fL    MCH 37.9 (H) 26.5 - 33.0 pg    MCHC 33.6 31.5 - 36.5 g/dL    RDW 13.2 10.0 - 15.0 %    Platelet Count 230 150 - 450 10e3/uL    % Neutrophils 62 %    % Lymphocytes 27 %    % Monocytes 9 %    % Eosinophils 1 %    % Basophils 1 %    % Immature Granulocytes 0 %    NRBCs per 100 WBC 0 <1 /100    Absolute Neutrophils 4.0 1.6 - 8.3 10e3/uL    Absolute Lymphocytes 1.7 0.8 - 5.3 10e3/uL    Absolute Monocytes 0.6 0.0 " - 1.3 10e3/uL    Absolute Eosinophils 0.1 0.0 - 0.7 10e3/uL    Absolute Basophils 0.1 0.0 - 0.2 10e3/uL    Absolute Immature Granulocytes 0.0 <=0.4 10e3/uL    Absolute NRBCs 0.0 10e3/uL       Imaging    No results found.      Signed by: NACHO Bustillos CNP      Again, thank you for allowing me to participate in the care of your patient.        Sincerely,        NACHO Bustillos CNP

## 2023-11-28 NOTE — PROGRESS NOTES
[facility] Hematology and Oncology Progress Note    Patient: Livier Bowers  MRN: [id number]  Date of Service: 11/28/2023        Reason for Visit    Chief Complaint   Patient presents with    Oncology Clinic Visit     Polycythemia vera       Assessment and Plan     Cancer Staging   No matching staging information was found for the patient.    Polycytemia, JAK2 and BCR/ABL negative: Patient continues on Hydrea.  She states that she is tolerating it quite well.  She is taking 500 mg daily on Monday through Friday and 1000 mg on the weekends.  Our goal is to keep her hematocrit in between 42-45%.  Been running around that range for a while.  Patient is requesting to only be seen once a year.  I did tell her it is important to continue to get her labs done frequently which she was agreeable to.  We will do her labs every 4 months and she will be seen once a year.  Changes to her meds currently.  She will also continue baby aspirin.    ECOG Performance    0 - Independent    Distress Screening (within last 30 days)    No data recorded     Pain  Pain Score: No Pain (0)    Problem List    Patient Active Problem List   Diagnosis    Polycythemia vera (H)    Pain In The Arms    Vitamin D Deficiency    Adenoma Of The Parathyroid Gland    Age-related osteoporosis without current pathological fracture    Rotator cuff tear arthropathy of right shoulder    Anxiety    DDD (degenerative disc disease), lumbar    Primary osteoarthritis of both hips        ______________________________________________________________________________    History of Present Illness    Hematologist: Dr. Diaz    Diagnosis: Polycythemia, JAK2 and BCR-ABL negative. Presented with hemoglobin 18 and hematocrit of 53. Likely secondary to smoking/COPD. Sleep study unremarkable. Declined phlebotomy.     Treatment: Hydrea. Initially at 500mg daily. Then increased to 1000mg daily. Pt has been somewhat noncompliant on and off. Has decreased dose and  "discontinued at times.   -January 2015: wanted to stop hydrea and started phlebotomies.   -March 2020: has iron deficiency so stopped phlebotomies and resumed hydrea. 500mg every other day.   [id number]: Slowly increased dosage to keep hematocrit under 45  -August 2021: increased to 500mg daily Monday through Friday and 1000mg on Sat, Sun. Continues on that dosage    Interim history: Patient is here today for a 4-month follow-up visit.  That she continues to be compliant with her Hydrea.  She says she does sometimes forget some dosages on the weekends but overall she is very good about taking it.  She has not noticed any side effects.  She denies any new bone or back pain.  Denies any swelling in her legs.  Denies any itching.  Denies any neurological changes.  Denies any infectious complaints.      Review of Systems      Pertinent items are noted in HPI.    Past History    Past Medical History:   Diagnosis Date    Osteoporosis     Parathyroid adenoma     Polycythemia vera (H)     Vitamin D deficiency        PHYSICAL EXAM  BP (!) 160/77   Pulse 88   Resp 18   Ht 1.6 m (5' 3\")   Wt 53.6 kg (118 lb 3.2 oz)   SpO2 97%   BMI 20.94 kg/m      GENERAL: no acute distress. Cooperative in conversation. Alone in clinic  RESP: Regular respiratory rate. No expiratory wheezes   NEURO: non focal. Alert and oriented x3.   PSYCH: within normal limits. No depression or anxiety.  SKIN: exposed skin is dry intact.     Lab Results    Recent Results (from the past 168 hour(s))   Comprehensive metabolic panel   Result Value Ref Range    Sodium 145 135 - 145 mmol/L    Potassium 4.3 3.4 - 5.3 mmol/L    Carbon Dioxide (CO2) 29 22 - 29 mmol/L    Anion Gap 9 7 - 15 mmol/L    Urea Nitrogen 18.8 8.0 - 23.0 mg/dL    Creatinine 0.88 0.51 - 0.95 mg/dL    GFR Estimate 66 >60 mL/min/1.73m2    Calcium 9.2 8.8 - 10.2 mg/dL    Chloride 107 98 - 107 mmol/L    Glucose 106 (H) 70 - 99 mg/dL    Alkaline Phosphatase 58 40 - 150 U/L    AST 18 0 - 45 U/L "    ALT 6 0 - 50 U/L    Protein Total 6.4 6.4 - 8.3 g/dL    Albumin 4.2 3.5 - 5.2 g/dL    Bilirubin Total 0.4 <=1.2 mg/dL   CBC with platelets and differential   Result Value Ref Range    WBC Count 6.4 4.0 - 11.0 10e3/uL    RBC Count 3.88 3.80 - 5.20 10e6/uL    Hemoglobin 14.7 11.7 - 15.7 g/dL    Hematocrit 43.7 35.0 - 47.0 %     (H) 78 - 100 fL    MCH 37.9 (H) 26.5 - 33.0 pg    MCHC 33.6 31.5 - 36.5 g/dL    RDW 13.2 10.0 - 15.0 %    Platelet Count 230 150 - 450 10e3/uL    % Neutrophils 62 %    % Lymphocytes 27 %    % Monocytes 9 %    % Eosinophils 1 %    % Basophils 1 %    % Immature Granulocytes 0 %    NRBCs per 100 WBC 0 <1 /100    Absolute Neutrophils 4.0 1.6 - 8.3 10e3/uL    Absolute Lymphocytes 1.7 0.8 - 5.3 10e3/uL    Absolute Monocytes 0.6 0.0 - 1.3 10e3/uL    Absolute Eosinophils 0.1 0.0 - 0.7 10e3/uL    Absolute Basophils 0.1 0.0 - 0.2 10e3/uL    Absolute Immature Granulocytes 0.0 <=0.4 10e3/uL    Absolute NRBCs 0.0 10e3/uL       Imaging    No results found.      Signed by: NACHO Bustillos CNP

## 2024-04-04 ENCOUNTER — LAB (OUTPATIENT)
Dept: INFUSION THERAPY | Facility: HOSPITAL | Age: 84
End: 2024-04-04
Attending: NURSE PRACTITIONER
Payer: COMMERCIAL

## 2024-04-04 DIAGNOSIS — D45 POLYCYTHEMIA VERA (H): ICD-10-CM

## 2024-04-04 LAB
ALBUMIN SERPL BCG-MCNC: 4.3 G/DL (ref 3.5–5.2)
ALP SERPL-CCNC: 62 U/L (ref 40–150)
ALT SERPL W P-5'-P-CCNC: 5 U/L (ref 0–50)
ANION GAP SERPL CALCULATED.3IONS-SCNC: 9 MMOL/L (ref 7–15)
AST SERPL W P-5'-P-CCNC: 21 U/L (ref 0–45)
BASOPHILS # BLD AUTO: 0.1 10E3/UL (ref 0–0.2)
BASOPHILS NFR BLD AUTO: 1 %
BILIRUB SERPL-MCNC: 0.4 MG/DL
BUN SERPL-MCNC: 17.3 MG/DL (ref 8–23)
CALCIUM SERPL-MCNC: 9.4 MG/DL (ref 8.8–10.2)
CHLORIDE SERPL-SCNC: 104 MMOL/L (ref 98–107)
CREAT SERPL-MCNC: 0.92 MG/DL (ref 0.51–0.95)
DEPRECATED HCO3 PLAS-SCNC: 28 MMOL/L (ref 22–29)
EGFRCR SERPLBLD CKD-EPI 2021: 62 ML/MIN/1.73M2
EOSINOPHIL # BLD AUTO: 0.1 10E3/UL (ref 0–0.7)
EOSINOPHIL NFR BLD AUTO: 1 %
ERYTHROCYTE [DISTWIDTH] IN BLOOD BY AUTOMATED COUNT: 13.3 % (ref 10–15)
GLUCOSE SERPL-MCNC: 149 MG/DL (ref 70–99)
HCT VFR BLD AUTO: 43.7 % (ref 35–47)
HGB BLD-MCNC: 14.7 G/DL (ref 11.7–15.7)
IMM GRANULOCYTES # BLD: 0 10E3/UL
IMM GRANULOCYTES NFR BLD: 0 %
LYMPHOCYTES # BLD AUTO: 1.5 10E3/UL (ref 0.8–5.3)
LYMPHOCYTES NFR BLD AUTO: 23 %
MCH RBC QN AUTO: 37.5 PG (ref 26.5–33)
MCHC RBC AUTO-ENTMCNC: 33.6 G/DL (ref 31.5–36.5)
MCV RBC AUTO: 112 FL (ref 78–100)
MONOCYTES # BLD AUTO: 0.4 10E3/UL (ref 0–1.3)
MONOCYTES NFR BLD AUTO: 6 %
NEUTROPHILS # BLD AUTO: 4.6 10E3/UL (ref 1.6–8.3)
NEUTROPHILS NFR BLD AUTO: 69 %
NRBC # BLD AUTO: 0 10E3/UL
NRBC BLD AUTO-RTO: 0 /100
PLATELET # BLD AUTO: 240 10E3/UL (ref 150–450)
POTASSIUM SERPL-SCNC: 3.8 MMOL/L (ref 3.4–5.3)
PROT SERPL-MCNC: 6.5 G/DL (ref 6.4–8.3)
RBC # BLD AUTO: 3.92 10E6/UL (ref 3.8–5.2)
SODIUM SERPL-SCNC: 141 MMOL/L (ref 135–145)
WBC # BLD AUTO: 6.6 10E3/UL (ref 4–11)

## 2024-04-04 PROCEDURE — 80053 COMPREHEN METABOLIC PANEL: CPT

## 2024-04-04 PROCEDURE — 85025 COMPLETE CBC W/AUTO DIFF WBC: CPT

## 2024-04-04 PROCEDURE — 36415 COLL VENOUS BLD VENIPUNCTURE: CPT

## 2024-05-28 ENCOUNTER — OFFICE VISIT (OUTPATIENT)
Dept: FAMILY MEDICINE | Facility: CLINIC | Age: 84
End: 2024-05-28
Payer: COMMERCIAL

## 2024-05-28 VITALS
WEIGHT: 112 LBS | TEMPERATURE: 97.8 F | BODY MASS INDEX: 19.84 KG/M2 | HEART RATE: 88 BPM | OXYGEN SATURATION: 98 % | DIASTOLIC BLOOD PRESSURE: 68 MMHG | SYSTOLIC BLOOD PRESSURE: 128 MMHG

## 2024-05-28 DIAGNOSIS — K13.70 MOUTH LESION: Primary | ICD-10-CM

## 2024-05-28 DIAGNOSIS — R09.81 SINUS CONGESTION: ICD-10-CM

## 2024-05-28 PROCEDURE — 99214 OFFICE O/P EST MOD 30 MIN: CPT | Performed by: FAMILY MEDICINE

## 2024-05-28 RX ORDER — RESPIRATORY SYNCYTIAL VIRUS VACCINE 120MCG/0.5
0.5 KIT INTRAMUSCULAR ONCE
Qty: 1 EACH | Refills: 0 | Status: CANCELLED | OUTPATIENT
Start: 2024-05-28 | End: 2024-05-28

## 2024-05-28 NOTE — PROGRESS NOTES
Assessment & Plan     (K13.70) Mouth lesion  (primary encounter diagnosis)  Comment: Mouth lesion, I question whether this is a benign vascular lesion that does not have an obvious appearance of cancer or of an infection.  Plan: PRIMARY CARE FOLLOW-UP SCHEDULING             (R09.81) Sinus congestion  Comment: Several months of sinus congestion possible hayfever.  Plan:      PLAN:  1.  In terms of the mouth lesion for now watchful waiting I told the patient I would like to see her back however in the month so this can be reevaluated.  2.  For the sinus issues over-the-counter Claritin or Allegra daily  3.  Follow-up in 3 to 4-weeks                  Subjective   Livier is a 83 year old, presenting for the following health issues:  Abnormality in Mouth        5/28/2024     1:46 PM   Additional Questions   Roomed by Kayla CONTRERAS   The patient comes in with 2 distinct concerns.  Patient was at her dentist about a month ago or so for a routine cleaning the dentist noticed a lesion in the right upper part of the hard palate, per the patient he was not particularly concerned or worried about it but did want her to be seen and evaluated for this.  The patient herself cannot feel this lesion at all there is also been no other change in her dental status.  The dentist apparently did x-rays there is no sign of an abscess or any dental issue.    For several months the patient is having some sinus congestion she feels some ear pain and pressure she does not have a history of sinus problems or difficulties, she does not have a history of hayfever she did try some Sudafed and Benadryl for maybe a week or so and did feel that that was making a difference.    I discussed with the patient that I do think that the lesion in the mouth is likely benign, and I think it is a separate issue from the sinuses.    I want the patient to take an over-the-counter antihistamine such as Claritin or Allegra and give this 3 to 4 weeks to see  if it is not helpful.                        Objective    /68   Pulse 88   Temp 97.8  F (36.6  C)   Wt 50.8 kg (112 lb)   SpO2 98%   BMI 19.84 kg/m    Body mass index is 19.84 kg/m .  Physical Exam   To examination.  The tympanic membranes are clear there is just a small amount of nonobstructing cerumen.  In the mouth there is a lesion on the right side of the back of the hard palate about 3 mm is slightly elongated is purplish in color it looks flats.  Neck I do not palpate any lymphadenopathy                  Signed Electronically by: Catracho Bermudez MD

## 2024-05-29 DIAGNOSIS — D45 POLYCYTHEMIA VERA (H): ICD-10-CM

## 2024-05-29 RX ORDER — HYDROXYUREA 500 MG/1
CAPSULE ORAL
Qty: 110 CAPSULE | Refills: 3 | Status: SHIPPED | OUTPATIENT
Start: 2024-05-29

## 2024-05-29 NOTE — TELEPHONE ENCOUNTER
[facility]: Cancer Care                                                                                          Refill request received via fax from [facility] for hydrea. Last refilled by Luke Guerrero CNP on 8/10/22. Quantity #110. 3 refills.     Last seen by Mary Padilla CNP on 11/28/23.  On recall list for follow-up November 2024.    Message sent to Mary.    Signature:  Tiarra Andrade RN

## 2024-06-26 ENCOUNTER — OFFICE VISIT (OUTPATIENT)
Dept: FAMILY MEDICINE | Facility: CLINIC | Age: 84
End: 2024-06-26
Payer: COMMERCIAL

## 2024-06-26 VITALS
HEART RATE: 73 BPM | OXYGEN SATURATION: 97 % | WEIGHT: 112 LBS | DIASTOLIC BLOOD PRESSURE: 60 MMHG | SYSTOLIC BLOOD PRESSURE: 128 MMHG | BODY MASS INDEX: 19.84 KG/M2 | TEMPERATURE: 97.9 F

## 2024-06-26 DIAGNOSIS — K13.70 MOUTH LESION: Primary | ICD-10-CM

## 2024-06-26 DIAGNOSIS — Z23 NEED FOR SHINGLES VACCINE: ICD-10-CM

## 2024-06-26 DIAGNOSIS — R09.81 SINUS CONGESTION: ICD-10-CM

## 2024-06-26 DIAGNOSIS — Z29.11 NEED FOR VACCINATION AGAINST RESPIRATORY SYNCYTIAL VIRUS: ICD-10-CM

## 2024-06-26 DIAGNOSIS — Z23 NEED FOR TDAP VACCINATION: ICD-10-CM

## 2024-06-26 PROCEDURE — 99214 OFFICE O/P EST MOD 30 MIN: CPT | Performed by: FAMILY MEDICINE

## 2024-06-26 RX ORDER — RESPIRATORY SYNCYTIAL VIRUS VACCINE 120MCG/0.5
0.5 KIT INTRAMUSCULAR ONCE
Qty: 1 EACH | Refills: 0 | Status: CANCELLED | OUTPATIENT
Start: 2024-06-26 | End: 2024-06-26

## 2024-06-26 NOTE — PROGRESS NOTES
Assessment & Plan     (K13.70) Mouth lesion  (primary encounter diagnosis)  Comment: Fairly  Onset apparently of a mouth lesion that does have a benign appearance to it  Plan:     (R09.81) Sinus congestion  Comment: Sinus congestion sense of ear fullness I suspect bilateral serous otitis media  Plan:      PLAN  1.  For the mouth lesion continued watchful waiting  2.  For the serous otitis media for now simply time she can continue the antihistamine I would refer to ENT at this time.  3.  For the neck pain, Tylenol heat to the area and follow-up with this in the above issues as needed.                Yonatan Maier is a 83 year old, presenting for the following health issues:  Mouth Abnormality Follow Up         6/26/2024     1:04 PM   Additional Questions   Roomed by Kayla RANGEL CMA     HPI   Patient comes in for follow-up of several medical issues.  Saw the patient just about a month ago or so she had a lesion in the upper right hand part of the hard palate that had been noticed by her dentist, I thought it was benign and then, and as I examine the lesion I do not think it is changed in size in any way, and I do think it is benign and we can simply continue to watch it.    Patient has a sense of fullness of the ears, I think is probably some fluid behind the ear she has been taking a daily antihistamine which is helping but she feels like the improvement has plateaued I think it just going to be time before what I think is some fluid that needs to drain behind the ears.    Patient has some pain in the right lateral aspect of the neck that does not radiate anywhere, she herself thinks this because she uses a cane with her right arm and that may be putting some pressure certainly this is possible, I suspect that there are some arthritis of the cervical spine as well as some muscle strain, recommend Tylenol heat and the like.                        Objective    /60   Pulse 73   Temp 97.9  F (36.6  C)    Wt 50.8 kg (112 lb)   SpO2 97%   BMI 19.84 kg/m    Body mass index is 19.84 kg/m .  Physical Exam   ENT examination.  The tympanic membranes are normal in appearance and there is minimal nonobstructing cerumen bilaterally.  Mouth lesion looks about the same is a very small purplish 2 to 3 mm meter lesion right upper back part of the hard palate.  Neck exam there is some mild reproducible tenderness in the right lateral aspect of the neck.              Signed Electronically by: Catracho Bermudez MD

## 2024-07-15 ENCOUNTER — PATIENT OUTREACH (OUTPATIENT)
Dept: CARE COORDINATION | Facility: CLINIC | Age: 84
End: 2024-07-15
Payer: COMMERCIAL

## 2024-07-29 ENCOUNTER — PATIENT OUTREACH (OUTPATIENT)
Dept: CARE COORDINATION | Facility: CLINIC | Age: 84
End: 2024-07-29
Payer: COMMERCIAL

## 2024-07-30 ENCOUNTER — LAB (OUTPATIENT)
Dept: INFUSION THERAPY | Facility: HOSPITAL | Age: 84
End: 2024-07-30
Attending: NURSE PRACTITIONER
Payer: COMMERCIAL

## 2024-07-30 DIAGNOSIS — D45 POLYCYTHEMIA VERA (H): ICD-10-CM

## 2024-07-30 LAB
ALBUMIN SERPL BCG-MCNC: 4 G/DL (ref 3.5–5.2)
ALP SERPL-CCNC: 60 U/L (ref 40–150)
ALT SERPL W P-5'-P-CCNC: 12 U/L (ref 0–50)
ANION GAP SERPL CALCULATED.3IONS-SCNC: 10 MMOL/L (ref 7–15)
AST SERPL W P-5'-P-CCNC: 22 U/L (ref 0–45)
BASOPHILS # BLD AUTO: 0.1 10E3/UL (ref 0–0.2)
BASOPHILS NFR BLD AUTO: 1 %
BILIRUB SERPL-MCNC: 0.3 MG/DL
BUN SERPL-MCNC: 14.2 MG/DL (ref 8–23)
CALCIUM SERPL-MCNC: 9.5 MG/DL (ref 8.8–10.4)
CHLORIDE SERPL-SCNC: 106 MMOL/L (ref 98–107)
CREAT SERPL-MCNC: 1.07 MG/DL (ref 0.51–0.95)
EGFRCR SERPLBLD CKD-EPI 2021: 52 ML/MIN/1.73M2
EOSINOPHIL # BLD AUTO: 0.2 10E3/UL (ref 0–0.7)
EOSINOPHIL NFR BLD AUTO: 3 %
ERYTHROCYTE [DISTWIDTH] IN BLOOD BY AUTOMATED COUNT: 13.1 % (ref 10–15)
GLUCOSE SERPL-MCNC: 78 MG/DL (ref 70–99)
HCO3 SERPL-SCNC: 28 MMOL/L (ref 22–29)
HCT VFR BLD AUTO: 42.8 % (ref 35–47)
HGB BLD-MCNC: 14.2 G/DL (ref 11.7–15.7)
IMM GRANULOCYTES # BLD: 0 10E3/UL
IMM GRANULOCYTES NFR BLD: 0 %
LYMPHOCYTES # BLD AUTO: 1.5 10E3/UL (ref 0.8–5.3)
LYMPHOCYTES NFR BLD AUTO: 24 %
MCH RBC QN AUTO: 36.8 PG (ref 26.5–33)
MCHC RBC AUTO-ENTMCNC: 33.2 G/DL (ref 31.5–36.5)
MCV RBC AUTO: 111 FL (ref 78–100)
MONOCYTES # BLD AUTO: 0.6 10E3/UL (ref 0–1.3)
MONOCYTES NFR BLD AUTO: 9 %
NEUTROPHILS # BLD AUTO: 3.9 10E3/UL (ref 1.6–8.3)
NEUTROPHILS NFR BLD AUTO: 63 %
NRBC # BLD AUTO: 0 10E3/UL
NRBC BLD AUTO-RTO: 0 /100
PLATELET # BLD AUTO: 257 10E3/UL (ref 150–450)
POTASSIUM SERPL-SCNC: 4.6 MMOL/L (ref 3.4–5.3)
PROT SERPL-MCNC: 6.3 G/DL (ref 6.4–8.3)
RBC # BLD AUTO: 3.86 10E6/UL (ref 3.8–5.2)
SODIUM SERPL-SCNC: 144 MMOL/L (ref 135–145)
WBC # BLD AUTO: 6.3 10E3/UL (ref 4–11)

## 2024-07-30 PROCEDURE — 82040 ASSAY OF SERUM ALBUMIN: CPT

## 2024-07-30 PROCEDURE — 36415 COLL VENOUS BLD VENIPUNCTURE: CPT

## 2024-07-30 PROCEDURE — 85025 COMPLETE CBC W/AUTO DIFF WBC: CPT

## 2024-09-18 ENCOUNTER — OFFICE VISIT (OUTPATIENT)
Dept: FAMILY MEDICINE | Facility: CLINIC | Age: 84
End: 2024-09-18
Payer: COMMERCIAL

## 2024-09-18 VITALS
HEIGHT: 63 IN | SYSTOLIC BLOOD PRESSURE: 136 MMHG | OXYGEN SATURATION: 99 % | DIASTOLIC BLOOD PRESSURE: 84 MMHG | HEART RATE: 94 BPM | BODY MASS INDEX: 19.61 KG/M2 | WEIGHT: 110.7 LBS

## 2024-09-18 DIAGNOSIS — Z00.00 ENCOUNTER FOR MEDICARE ANNUAL WELLNESS EXAM: Primary | ICD-10-CM

## 2024-09-18 DIAGNOSIS — D45 POLYCYTHEMIA VERA (H): ICD-10-CM

## 2024-09-18 DIAGNOSIS — M81.0 AGE-RELATED OSTEOPOROSIS WITHOUT CURRENT PATHOLOGICAL FRACTURE: ICD-10-CM

## 2024-09-18 DIAGNOSIS — R42 DIZZINESS: ICD-10-CM

## 2024-09-18 DIAGNOSIS — K13.70 MOUTH LESION: ICD-10-CM

## 2024-09-18 DIAGNOSIS — H93.8X3 EAR PRESSURE, BILATERAL: ICD-10-CM

## 2024-09-18 DIAGNOSIS — Z13.1 SCREENING FOR DIABETES MELLITUS: ICD-10-CM

## 2024-09-18 DIAGNOSIS — Z23 NEED FOR TDAP VACCINATION: ICD-10-CM

## 2024-09-18 DIAGNOSIS — F41.9 ANXIETY: ICD-10-CM

## 2024-09-18 DIAGNOSIS — M51.369 DDD (DEGENERATIVE DISC DISEASE), LUMBAR: ICD-10-CM

## 2024-09-18 DIAGNOSIS — Z23 NEED FOR SHINGLES VACCINE: ICD-10-CM

## 2024-09-18 DIAGNOSIS — Z29.11 NEED FOR VACCINATION AGAINST RESPIRATORY SYNCYTIAL VIRUS: ICD-10-CM

## 2024-09-18 PROCEDURE — G0439 PPPS, SUBSEQ VISIT: HCPCS | Performed by: FAMILY MEDICINE

## 2024-09-18 PROCEDURE — 99213 OFFICE O/P EST LOW 20 MIN: CPT | Mod: 25 | Performed by: FAMILY MEDICINE

## 2024-09-18 NOTE — PROGRESS NOTES
Preventive Care Visit  [facility] SHAHID Patten MD, Family Medicine  Sep 18, 2024      Assessment & Plan     Livier was seen today for medicare visit.    Diagnoses and all orders for this visit:    Encounter for Medicare annual wellness exam    Ear pressure, bilateral  Comments:  denies ringing of ear and hearing loss  Orders:  -     Adult ENT  Referral; Future    Dizziness  -     Adult ENT  Referral; Future    Polycythemia vera (H)  Comments:  following oncologist every 4 month with labs   taking hydrea    DDD (degenerative disc disease), lumbar  Comments:  left side sciatica till butt and back of the leg, she feel she is coping well, but willing to do PT again  Orders:  -     Physical Therapy  Referral; Future    Age-related osteoporosis without current pathological fracture    Need for shingles vaccine  -     zoster vaccine recombinant adjuvanted (SHINGRIX) injection; Inject 0.5 mLs into the muscle once for 1 dose. Pharmacist administered    Need for Tdap vaccination  -     Tdap, tetanus-diptheria-acell pertussis, (BOOSTRIX) 5-2.5-18.5 LF-MCG/0.5 DELILAH injection; Inject 0.5 mLs into the muscle once for 1 dose.    Need for vaccination against respiratory syncytial virus  -     RSV vaccine, bivalent, ABRYSVO, injection; Inject 0.5 mLs into the muscle once for 1 dose. Pharmacist administered    Anxiety    Mouth lesion  -     Adult ENT  Referral; Future    Screening for diabetes mellitus  -     Hemoglobin A1c; Future    Other orders  -     PRIMARY CARE FOLLOW-UP SCHEDULING; Future  -     COVID-19 12+ (PFIZER); Future  -     Pneumococcal 20 Valent Conjugate (PCV20); Future  -     REVIEW OF HEALTH MAINTENANCE PROTOCOL ORDERS       Will place the referral for physical therapy as well as ENT for further evaluate the ear pressure congestion and dizziness as well as the mouth lesion which is present for more than 3 months  Elevated blood pressure reading without the  diagnosis of hypertension dietary advice done  Patient has been advised of split billing requirements and indicates understanding: Yes        Counseling  Appropriate preventive services were addressed with this patient via screening, questionnaire, or discussion as appropriate for fall prevention, nutrition, physical activity, Tobacco-use cessation, social engagement, weight loss and cognition.  Checklist reviewing preventive services available has been given to the patient.  Reviewed patient's diet, addressing concerns and/or questions.   Discussed possible causes of fatigue.     MEDICATIONS:  Continue current medications without change    Yonatan Maier is a 83 year old, presenting for the following:  Medicare Visit (AWV.)        6/26/2024     1:04 PM   Additional Questions   Roomed by Kayla RANGEL CMA         Health Care Directive  Patient does not have a Health Care Directive or Living Will: Patient states has Advance Directive and will bring in a copy to clinic.    HPI  Wt Readings from Last 4 Encounters:   09/18/24 50.2 kg (110 lb 11.2 oz)   06/26/24 50.8 kg (112 lb)   05/28/24 50.8 kg (112 lb)   11/28/23 53.6 kg (118 lb 3.2 oz)      Mouth lesion: Patient seen by Dr. Bermudez twice   she had a lesion in the upper right hand part of the hard palate that had been noticed by her dentist,  was presumed to be benign and  plan was to simply continue to watch it. But patient is intrested in have it looked by ENT, as she also struggling with ear pressure and pain and also dizziness worsening over last 1 month denies any tinnitus or extreme hearing loss    annual Wellness Visit     Patient has been advised of split billing requirements and indicates understanding: Yes       Health Care Directive  Patient does not have a Health Care Directive or Living Will: Patient states has Advance Directive and will bring in a copy to clinic.      9/18/2024   General Health   How would you rate your overall physical health? Good    Feel stress (tense, anxious, or unable to sleep) To some extent             9/18/2024   Nutrition   Diet: Regular (no restrictions)            9/18/2024   Exercise   Days per week of moderate/strenous exercise 4 days              9/18/2024   Social Factors   Frequency of gathering with friends or relatives More than three times a week   Worry food won't last until get money to buy more No   Food not last or not have enough money for food? No   Do you have housing? (Housing is defined as stable permanent housing and does not include staying ouside in a car, in a tent, in an abandoned building, in an overnight shelter, or couch-surfing.) Yes   Are you worried about losing your housing? No   Lack of transportation? No   Unable to get utilities (heat,electricity)? No              9/18/2024   Fall Risk   Fallen 2 or more times in the past year? No    No   Trouble with walking or balance? Yes    Yes   Reason Gait Speed Test Not Completed Patient does not tolerate an upright or standing position (e.g. wheelchair)       Multiple values from one day are sorted in reverse-chronological order          9/18/2024   Activities of Daily Living- Home Safety   Needs help with the following daily activites None of the above   Safety concerns in the home None of the above            9/18/2024   Dental   Dentist two times every year? Yes            9/18/2024   Hearing Screening   Hearing concerns? None of the above            9/18/2024   Driving Risk Screening   Patient/family members have concerns about driving No            9/18/2024   General Alertness/Fatigue Screening   Have you been more tired than usual lately? (!) YES            9/18/2024   Urinary Incontinence Screening   Bothered by leaking urine in past 6 months No            9/18/2024   TB Screening   Were you born outside of the [place]? No          Social History     Tobacco Use    Smoking status: Former     Current packs/day: 0.00     Average packs/day: 0.5 packs/day for  52.1 years (26.0 ttl pk-yrs)     Types: Cigarettes     Start date:      Quit date: 2014     Years since quitting: 10.6     Passive exposure: Never    Smokeless tobacco: Never    Tobacco comments:     E-cigs  10/day   Vaping Use    Vaping status: Never Used   Substance Use Topics    Alcohol use: Yes     Alcohol/week: 0.0 standard drinks of alcohol    Drug use: No         Today's PHQ-2 Score:       2024    12:37 PM   PHQ-2 (  [employer])   Q1: Little interest or pleasure in doing things 0   Q2: Feeling down, depressed or hopeless 0   PHQ-2 Score 0   Q1: Little interest or pleasure in doing things Not at all   Q2: Feeling down, depressed or hopeless Not at all   PHQ-2 Score 0          Mammogram Screening - After age 74- determine frequency with patient based on health status, life expectancy and patient goals      Fracture Risk Assessment Tool  Link to Frax Calculator  Use the information below to complete the Frax calculator  : 1940  Sex: female  Weight (kg): 50.2 kg (actual weight)  Height (cm): 160 cm  Previous Fragility Fracture:  No  History of parent with fractured hip:  No  Current Smoking:  No  Patient has been on glucocorticoids for more than 3 months (5mg/day or more): No  Rheumatoid Arthritis on Problem List:  No  Secondary Osteoporosis on Problem List:  No  Consumes 3 or more units of alcohol per day: No  Femoral Neck BMD (g/cm2)              Reviewed and updated as needed this visit by Provider   Tobacco  Allergies  Meds  Problems  Med Hx  Surg Hx  Fam Hx                Current providers sharing in care for this patient include:  Patient Care Team:  Va Patten MD as PCP - General (Family Practice)  Deirdre Diaz MD as MD (Hematology & Oncology)  Matthew Moon MD as Assigned PCP  Tiarra Andrade, RN as Specialty Care Coordinator (Hematology & Oncology)  Mary Padilla APRN CNP as Assigned Cancer Care Provider    The following health maintenance items are reviewed in  Epic and correct as of today:  Health Maintenance   Topic Date Due    COVID-19 Vaccine (1) Never done    ZOSTER IMMUNIZATION (1 of 2) Never done    Pneumococcal Vaccine: 65+ Years (2 of 2 - PCV) 02/25/2001    DTAP/TDAP/TD IMMUNIZATION (1 - Tdap) 02/05/2010    RSV VACCINE (1 - 1-dose 75+ series) Never done    MEDICARE ANNUAL WELLNESS VISIT  09/18/2025    ANNUAL REVIEW OF HM ORDERS  09/18/2025    FALL RISK ASSESSMENT  09/18/2025    ADVANCE CARE PLANNING  09/18/2029    DEXA  08/22/2038    PHQ-2 (once per calendar year)  Completed    HPV IMMUNIZATION  Aged Out    MENINGITIS IMMUNIZATION  Aged Out    RSV MONOCLONAL ANTIBODY  Aged Out    INFLUENZA VACCINE  Discontinued       Appropriate preventive services were discussed with this patient, including applicable screening as appropriate for fall prevention, nutrition, physical activity, Tobacco-use cessation, weight loss and cognition.  Checklist reviewing preventive services available has been given to the patient.           9/18/2024   Mini Cog   Clock Draw Score 2 Normal   3 Item Recall 3 objects recalled   Mini Cog Total Score 5                     9/18/2024   General Health   How would you rate your overall physical health? Good   Feel stress (tense, anxious, or unable to sleep) To some extent      (!) STRESS CONCERN      9/18/2024   Nutrition   Diet: Regular (no restrictions)            9/18/2024   Exercise   Days per week of moderate/strenous exercise 4 days            9/18/2024   Social Factors   Frequency of gathering with friends or relatives More than three times a week   Worry food won't last until get money to buy more No   Food not last or not have enough money for food? No   Do you have housing? (Housing is defined as stable permanent housing and does not include staying ouside in a car, in a tent, in an abandoned building, in an overnight shelter, or couch-surfing.) Yes   Are you worried about losing your housing? No   Lack of transportation? No   Unable to  get utilities (heat,electricity)? No            9/18/2024   Fall Risk   Fallen 2 or more times in the past year? No    No   Trouble with walking or balance? Yes    Yes   Reason Gait Speed Test Not Completed Patient does not tolerate an upright or standing position (e.g. wheelchair)       Multiple values from one day are sorted in reverse-chronological order          9/18/2024   Activities of Daily Living- Home Safety   Needs help with the following daily activites None of the above   Safety concerns in the home None of the above            9/18/2024   Dental   Dentist two times every year? Yes            9/18/2024   Hearing Screening   Hearing concerns? None of the above            9/18/2024   Driving Risk Screening   Patient/family members have concerns about driving No            9/18/2024   General Alertness/Fatigue Screening   Have you been more tired than usual lately? (!) YES            9/18/2024   Urinary Incontinence Screening   Bothered by leaking urine in past 6 months No            9/18/2024   TB Screening   Were you born outside of the [place]? No            Today's PHQ-2 Score:       9/18/2024    12:37 PM   PHQ-2 ( 1999 [employer])   Q1: Little interest or pleasure in doing things 0   Q2: Feeling down, depressed or hopeless 0   PHQ-2 Score 0   Q1: Little interest or pleasure in doing things Not at all   Q2: Feeling down, depressed or hopeless Not at all   PHQ-2 Score 0           9/18/2024   Substance Use   Alcohol more than 3/day or more than 7/wk No   Do you have a current opioid prescription? No   How severe/bad is pain from 1 to 10? 4/10   Do you use any other substances recreationally? No        Social History     Tobacco Use    Smoking status: Former     Current packs/day: 0.00     Average packs/day: 0.5 packs/day for 52.1 years (26.0 ttl pk-yrs)     Types: Cigarettes     Start date: 1962     Quit date: 2/1/2014     Years since quitting: 10.6     Passive exposure: Never    Smokeless tobacco: Never     "Tobacco comments:     E-cigs  10/day   Vaping Use    Vaping status: Never Used   Substance Use Topics    Alcohol use: Yes     Alcohol/week: 0.0 standard drinks of alcohol    Drug use: No                        Reviewed and updated as needed this visit by Provider   Tobacco  Allergies  Meds  Problems  Med Hx  Surg Hx  Fam Hx              Current providers sharing in care for this patient include:  Patient Care Team:  Va Patten MD as PCP - General (Family Practice)  Deirdre Diaz MD as MD (Hematology & Oncology)  Matthew Moon MD as Assigned PCP  iTarra Andrade, RN as Specialty Care Coordinator (Hematology & Oncology)  Mary Padilla APRN CNP as Assigned Cancer Care Provider    The following health maintenance items are reviewed in Epic and correct as of today:  Health Maintenance   Topic Date Due    COVID-19 Vaccine (1) Never done    ZOSTER IMMUNIZATION (1 of 2) Never done    Pneumococcal Vaccine: 65+ Years (2 of 2 - PCV) 02/25/2001    DTAP/TDAP/TD IMMUNIZATION (1 - Tdap) 02/05/2010    RSV VACCINE (1 - 1-dose 75+ series) Never done    MEDICARE ANNUAL WELLNESS VISIT  09/18/2025    ANNUAL REVIEW OF HM ORDERS  09/18/2025    FALL RISK ASSESSMENT  09/18/2025    ADVANCE CARE PLANNING  09/18/2029    DEXA  08/22/2038    PHQ-2 (once per calendar year)  Completed    HPV IMMUNIZATION  Aged Out    MENINGITIS IMMUNIZATION  Aged Out    RSV MONOCLONAL ANTIBODY  Aged Out    INFLUENZA VACCINE  Discontinued         Review of Systems  Constitutional, neuro,  endocrine, pulmonary, cardiac, gastrointestinal, genitourinary, musculoskeletal, integument and psychiatric systems are negative, except as otherwise noted.     Objective    Exam  /84   Pulse 94   Ht 1.6 m (5' 3\")   Wt 50.2 kg (110 lb 11.2 oz)   SpO2 99%   BMI 19.61 kg/m     Estimated body mass index is 19.61 kg/m  as calculated from the following:    Height as of this encounter: 1.6 m (5' 3\").    Weight as of this encounter: 50.2 kg (110 lb 11.2 " oz).    Physical Exam  GENERAL: alert and no distress  EYES: Eyes grossly normal to inspection, PERRL and conjunctivae and sclerae normal  HENT: ear canals and TM's normal mild fluid behind both TMs, nose and mouth right upper end of her soft palate 1 x 1 cm lesion with the hyperpigmented spot in the center     NECK: no adenopathy, no asymmetry, masses, or scars  RESP: lungs clear to auscultation - no rales, rhonchi or wheezes  CV: regular rate and rhythm, normal S1 S2, no S3 or S4, no murmur, click or rub, no peripheral edema  MS: Slow careful gait walking with the help of cane         9/18/2024   Mini Cog   Clock Draw Score 2 Normal   3 Item Recall 3 objects recalled   Mini Cog Total Score 5                 Signed Electronically by: Va Patten MD

## 2024-09-18 NOTE — ASSESSMENT & PLAN NOTE
Degenerative disc disease: Patient has advanced degenerative disc disease of lumbar spine.  She complains of aching and pain in her lumbar spine that is made worse with walking on uneven surfaces.     Osteoarthritis bilateral hips: Patient has significant degenerative arthritis  of bilateral hips.  She is not interested in surgical repair. She walks with a cane.

## 2024-09-18 NOTE — PATIENT INSTRUCTIONS
Patient Education   Preventive Care Advice   This is general advice given by our system to help you stay healthy. However, your care team may have specific advice just for you. Please talk to your care team about your preventive care needs.  Nutrition  Eat 5 or more servings of fruits and vegetables each day.  Try wheat bread, brown rice and whole grain pasta (instead of white bread, rice, and pasta).  Get enough calcium and vitamin D. Check the label on foods and aim for 100% of the RDA (recommended daily allowance).  Lifestyle  Exercise at least 150 minutes each week  (30 minutes a day, 5 days a week).  Do muscle strengthening activities 2 days a week. These help control your weight and prevent disease.  No smoking.  Wear sunscreen to prevent skin cancer.  Have a dental exam and cleaning every 6 months.  Yearly exams  See your health care team every year to talk about:  Any changes in your health.  Any medicines your care team has prescribed.  Preventive care, family planning, and ways to prevent chronic diseases.  Shots (vaccines)   HPV shots (up to age 26), if you've never had them before.  Hepatitis B shots (up to age 59), if you've never had them before.  COVID-19 shot: Get this shot when it's due.  Flu shot: Get a flu shot every year.  Tetanus shot: Get a tetanus shot every 10 years.  Pneumococcal, hepatitis A, and RSV shots: Ask your care team if you need these based on your risk.  Shingles shot (for age 50 and up)  General health tests  Diabetes screening:  Starting at age 35, Get screened for diabetes at least every 3 years.  If you are younger than age 35, ask your care team if you should be screened for diabetes.  Cholesterol test: At age 39, start having a cholesterol test every 5 years, or more often if advised.  Bone density scan (DEXA): At age 50, ask your care team if you should have this scan for osteoporosis (brittle bones).  Hepatitis C: Get tested at least once in your life.  STIs (sexually  transmitted infections)  Before age 24: Ask your care team if you should be screened for STIs.  After age 24: Get screened for STIs if you're at risk. You are at risk for STIs (including HIV) if:  You are sexually active with more than one person.  You don't use condoms every time.  You or a partner was diagnosed with a sexually transmitted infection.  If you are at risk for HIV, ask about PrEP medicine to prevent HIV.  Get tested for HIV at least once in your life, whether you are at risk for HIV or not.  Cancer screening tests  Cervical cancer screening: If you have a cervix, begin getting regular cervical cancer screening tests starting at age 21.  Breast cancer scan (mammogram): If you've ever had breasts, begin having regular mammograms starting at age 40. This is a scan to check for breast cancer.  Colon cancer screening: It is important to start screening for colon cancer at age 45.  Have a colonoscopy test every 10 years (or more often if you're at risk) Or, ask your provider about stool tests like a FIT test every year or Cologuard test every 3 years.  To learn more about your testing options, visit:   .  For help making a decision, visit:   https://bit.ly/uz60130.  Prostate cancer screening test: If you have a prostate, ask your care team if a prostate cancer screening test (PSA) at age 55 is right for you.  Lung cancer screening: If you are a current or former smoker ages 50 to 80, ask your care team if ongoing lung cancer screenings are right for you.  For informational purposes only. Not to replace the advice of your health care provider. Copyright   2023 [employer] All rights reserved. Clinically reviewed by the [facility] Transitions Program. [employer] [id number] - REV 01/24.

## 2024-10-01 ENCOUNTER — DOCUMENTATION ONLY (OUTPATIENT)
Dept: OTHER | Facility: CLINIC | Age: 84
End: 2024-10-01
Payer: COMMERCIAL

## 2024-10-24 ENCOUNTER — THERAPY VISIT (OUTPATIENT)
Dept: PHYSICAL THERAPY | Facility: REHABILITATION | Age: 84
End: 2024-10-24
Attending: FAMILY MEDICINE
Payer: COMMERCIAL

## 2024-10-24 DIAGNOSIS — Z74.09 IMPAIRED FUNCTIONAL MOBILITY, BALANCE, GAIT, AND ENDURANCE: Primary | ICD-10-CM

## 2024-10-24 DIAGNOSIS — M51.369 DDD (DEGENERATIVE DISC DISEASE), LUMBAR: ICD-10-CM

## 2024-10-24 PROCEDURE — 97162 PT EVAL MOD COMPLEX 30 MIN: CPT | Mod: GP

## 2024-10-24 PROCEDURE — 97110 THERAPEUTIC EXERCISES: CPT | Mod: GP

## 2024-10-24 NOTE — PROGRESS NOTES
PHYSICAL THERAPY EVALUATION  Type of Visit: Evaluation       Fall Risk Screen:  Fall screen completed by: PT  Have you fallen 2 or more times in the past year?: (Patient-Rptd) No  Have you fallen and had an injury in the past year?: (Patient-Rptd) No  Is patient a fall risk?: Yes    Subjective       Livier presents to physical therapy reporting that in 2020 she had low back pain with left leg symptoms, and she met with the spine clinic. She had physical therapy which was somewhat helpful, and her symptoms eventually improved without any medication, injection, or other formal treatment. She said that her back and leg symptoms continue to be quite minor, and her main concern is her balance and walking. Her balance and walking issues started when her back symptoms started. She now reports that her right hip has started bothering her as she compensates for her gait issues. Worst pain: right hip 3-4/10, low back and left leg 3/10. Best pain: right hip 0/10, low back and left leg 0/10. Current: both 0/10. She had numbness and tingling initially, but she has not had any recently. She also denies any saddle anesthesia, bowel/bladder changes, and sudden onset and/or focal muscle weakness. She finds relief being off of her feet, and weight-bearing activities are her primary aggravating factor. She started using a cane about a year ago, but she only uses it outside of the house.  Presenting condition or subjective complaint: (Patient-Rptd) balance and walking  Date of onset: 09/18/24 (Date recorded is date of referral)    Relevant medical history: (Patient-Rptd) Osteoporosis; Thyroid problems   Dates & types of surgery: (Patient-Rptd) thyroid approx 8 yrs ago    Prior diagnostic imaging/testing results: (Patient-Rptd) MRI     Prior therapy history for the same diagnosis, illness or injury: (Patient-Rptd) Yes (Patient-Rptd) 5 yrs ago    Prior Level of Function  Transfers: Independent  Ambulation: Assistive equipment, single-end  cane  ADL: Independent  IADL:  see below    Living Environment  Social support: (Patient-Rptd) Alone   Type of home: (Patient-Rptd) [facility]   Stairs to enter the home:         Ramp: (Patient-Rptd) No   Stairs inside the home: (Patient-Rptd) Yes (Patient-Rptd) 15 Is there a railing: (Patient-Rptd) Yes     Help at home: (Patient-Rptd) Home and Yard maintenance tasks  Equipment owned: (Patient-Rptd) Straight Cane     Employment:      Hobbies/Interests:      Patient goals for therapy:      Pain assessment:  see subjective report     Objective      Cognitive Status Examination  Orientation: Oriented to person, place and time   Level of Consciousness: Alert  Follows Commands and Answers Questions: 100% of the time, Follows multi step instructions  Personal Safety and Judgement: Intact  Memory: Intact    OBSERVATION:   INTEGUMENTARY:   POSTURE:   PALPATION:   RANGE OF MOTION:   STRENGTH: see below    BED MOBILITY:     TRANSFERS:     WHEELCHAIR MOBILITY:     GAIT: See below  Level of [place]:   Assistive Device(s):   Gait Deviations:   Gait Distance:   Stairs:     BALANCE:     SPECIAL TESTS  Functional Gait Assessment (FGA)      10 Meter Walk Test (Comfortable)     10 Meter Walk Test (Fast)     6 Minute Walk Test (6MWT)           P[place] Balance Scale (BBS)     5 Times Sit-to-Stand (5TSTS)  19.85 seconds (right upper extremity assist on armrest)     Dynamic Gait Index (DGI)     Timed Up and Go (TUG) - sec 17.92 (no assistive device)   Single Leg Stance Right (sec)    Single Leg Stance Left (sec)    Modified CTSIB Conditions (sec) Cond 1:   Cond 2:   Cond 4:   Cond 5 :    Romberg  (sec) Eyes open: 30+ seconds (min sway). Eyes closed: 30+ seconds (mod sway).   Sharpened Romberg (sec)    30 Second Sit to Stand (reps/height)    Mini-BESTest              SENSATION:     REFLEXES:   COORDINATION:   MUSCLE TONE:       LUMBAR SPINE EVALUATION  PAIN: see subjective report  INTEGUMENTARY (edema, incisions):   POSTURE:   GAIT:    Weightbearing Status: WBAT  Assistive Device(s): Cane (single end)  Gait Deviations: Antalgic  Base of support increased  Stride length decreased  Knee extension decreased L, Knee extension decreased R  Trunk flexion  BALANCE/PROPRIOCEPTION:   WEIGHTBEARING ALIGNMENT: Knee WB Alignment:Genu valgus L, Genu valgus R, knee flexion  Lumbar/Pelvic WB Alignment:trunk flexion  NON-WEIGHTBEARING ALIGNMENT:    ROM:   (Degrees) Left AROM Left PROM  Right AROM Right PROM   Hip Flexion       Hip Extension       Hip Abduction       Hip Adduction       Hip Internal Rotation       Hip External Rotation       Knee Flexion WFL  WFL    Knee Extension WNL  Min limited compared to left    Lumbar Side-bending Mid-thigh (stiff) Mid-thigh (stiff)   Lumbar Flexion Toes   Lumbar Extension 0% to 25% (stiff)   Lumbar Rotation 75% bilaterally   Pain:   End feel:   PELVIC/SI SCREEN:   STRENGTH:   - Hip abduction and adduction: 5/5 bilaterally  - Knee flexion: left 5/5, right 4+/5 (min pain)  MYOTOMES:    Left Right   T12-L3 (Hip Flexion) 4-, + (mild) 4   L2-4 (Quads)  5 5   L4 (Ankle DF) 5 5   L5 (Great Toe Ext) 5 5   S1 (Toe Raise) 5 5     DTR S:    Left Right   C5 (Biceps)     C6 (Brachioradialis)     C7 (Triceps)     L4 (Quad) 3 2   S1 (Achilles) 0 0     CORD SIGNS: Quiros negative L, Quiros negative R, ankle clonus negative bilaterally  DERMATOMES: WNL  NEURAL TENSION:   FLEXIBILITY:   LUMBAR/HIP Special Tests:    PELVIS/SI SPECIAL TESTS:   FUNCTIONAL TESTS:   PALPATION:   SPINAL SEGMENTAL CONCLUSIONS:       Assessment & Plan   CLINICAL IMPRESSIONS  Medical Diagnosis: DDD (degenerative disc disease), lumbar (left side sciatica till butt and back of the leg, she feel she is coping well, but willing to do PT again)    Treatment Diagnosis: Impaired functional mobility, balance, gait, and endurance   Impression/Assessment: Patient is a 83 year old female with low back and leg pain with impaired functional balance and gait complaints.  The  following significant findings have been identified: Pain, Decreased ROM/flexibility, Decreased joint mobility, Decreased strength, Impaired balance, Impaired gait, Impaired muscle performance, Decreased activity tolerance, and Impaired posture. These impairments interfere with their ability to perform self care tasks, recreational activities, household chores, household mobility, and community mobility as compared to previous level of function.     Clinical Decision Making (Complexity):  Clinical Presentation: Stable/Uncomplicated  Clinical Presentation Rationale: based on medical and personal factors listed in PT evaluation  Clinical Decision Making (Complexity): Moderate complexity    PLAN OF CARE  Treatment Interventions:  Interventions: Gait Training, Manual Therapy, Neuromuscular Re-education, Therapeutic Activity, Therapeutic Exercise, Self-Care/Home Management, Standardized Testing    Long Term Goals     PT Goal 1  Goal Identifier: HEP  Goal Description: Livier will demonstrate mastery of ([id number] need for cueing) and compliance with (completion at least 5/7 days/week) her home exercise program to facilitate increased rate of improvement.  Goal Progress: In progress  Target Date: 11/21/24  PT Goal 2  Goal Identifier: TUG  Goal Description: Livier will demonstrate a decreased fall risk as evidenced by an improved (decreased) TUG time of less than or equal to 15 seconds without an assistive device.  Goal Progress: 17.92 seconds (no assistive device)  Target Date: 12/26/24  PT Goal 3  Goal Identifier: 5-time Sit-to-stand  Goal Description: Livier will demonstrate increase lower extremity strength and improved tolerance to functional transfers as evidenced by an improved 5-time sit-to-stand score of 20 seconds without use of upper extremities.  Goal Progress: Unable.  Modified: 19.85 seconds (right upper extremity assist on armrest).  Target Date: 12/26/24  PT Goal 4  Goal Identifier: FGA  Goal Description:  Will assess at future visit.  Goal Progress: Will assess at future visit.  Target Date: 12/26/24      Frequency of Treatment: 1 time per week  Duration of Treatment: 9 weeks    Recommended Referrals to Other Professionals:   Education Assessment:   Learner/Method: Patient;Listening;Demonstration;Pictures/Video;No Barriers to Learning    Risks and benefits of evaluation/treatment have been explained.   Patient/Family/caregiver agrees with Plan of Care.     Evaluation Time:     PT Sangeetaal, Moderate Complexity Minutes (31715): 28       Signing Clinician: Phillip Saez PT        [facility]                                                                                   OUTPATIENT PHYSICAL THERAPY      PLAN OF TREATMENT FOR OUTPATIENT REHABILITATION   Patient's Last Name, First Name, Livier Harman YOB: 1940   Provider's Name   [facility]   Medical Record No.  [id number]     Onset Date: 09/18/24 (Date recorded is date of referral)  Start of Care Date: 10/24/24     Medical Diagnosis:  DDD (degenerative disc disease), lumbar (left side sciatica till butt and back of the leg, she feel she is coping well, but willing to do PT again)      PT Treatment Diagnosis:  Impaired functional mobility, balance, gait, and endurance Plan of Treatment  Frequency/Duration: 1 time per week/ 9 weeks    Certification date from 10/24/24 to 12/26/24         See note for plan of treatment details and functional goals     Phillip Saez, PT                         I CERTIFY THE NEED FOR THESE SERVICES FURNISHED UNDER        THIS PLAN OF TREATMENT AND WHILE UNDER MY CARE     (Physician attestation of this document indicates review and certification of the therapy plan).              Referring Provider:  Va Patten    Initial Assessment  See Epic Evaluation- Start of Care Date: 10/24/24

## 2024-10-31 ENCOUNTER — THERAPY VISIT (OUTPATIENT)
Dept: PHYSICAL THERAPY | Facility: REHABILITATION | Age: 84
End: 2024-10-31
Attending: FAMILY MEDICINE
Payer: COMMERCIAL

## 2024-10-31 DIAGNOSIS — M51.369 DEGENERATION OF INTERVERTEBRAL DISC OF LUMBAR REGION, UNSPECIFIED WHETHER PAIN PRESENT: Primary | ICD-10-CM

## 2024-10-31 PROCEDURE — 97110 THERAPEUTIC EXERCISES: CPT | Mod: GP

## 2024-10-31 PROCEDURE — 97750 PHYSICAL PERFORMANCE TEST: CPT | Mod: GP

## 2024-10-31 NOTE — PROGRESS NOTES
PLAN  Continue therapy per current plan of care.    Beginning/End Dates of Progress Note Reporting Period:  10/24/2024 to 10/31/2024    Referring Provider:  Va Patten           10/31/24 0500   Appointment Info   Signing clinician's name / credentials Phillip Saez, PT   Visits Used 2   Medical Diagnosis DDD (degenerative disc disease), lumbar  (left side sciatica till butt and back of the leg, she feel she is coping well, but willing to do PT again)   PT Tx Diagnosis Impaired functional mobility, balance, gait, and endurance   Progress Note/Certification   Start of Care Date 10/24/24   Onset of illness/injury or Date of Surgery 09/18/24  (Date recorded is date of referral)   Therapy Frequency 1 time per week   Predicted Duration 9 weeks   Certification date from 10/24/24   Certification date to 12/26/24   Progress Note Due Date 11/21/24   PT Goal 1   Goal Identifier HEP   Goal Description Livier will demonstrate mastery of ([id number] need for cueing) and compliance with (completion at least 5/7 days/week) her home exercise program to facilitate increased rate of improvement.   Goal Progress In progress   Target Date 11/21/24   PT Goal 2   Goal Identifier TUG   Goal Description Livier will demonstrate a decreased fall risk as evidenced by an improved (decreased) TUG time of less than or equal to 15 seconds without an assistive device.   Goal Progress 17.92 seconds (no assistive device)   Target Date 12/26/24   PT Goal 3   Goal Identifier 5-time Sit-to-stand   Goal Description Livier will demonstrate increase lower extremity strength and improved tolerance to functional transfers as evidenced by an improved 5-time sit-to-stand score of 20 seconds without use of upper extremities.   Goal Progress Unable.  Modified: 19.85 seconds (right upper extremity assist on armrest).   Target Date 12/26/24   PT Goal 4   Goal Identifier FGA   Goal Description Livier will demonstrate increased safety and independence with  functional mobility and a decreased fall risk as evidenced by an improved FGA score of at least 18/30.   Goal Progress 14/30   Target Date 12/26/24   Subjective Report   Subjective Report Livier reports that the hip flexor stretch hurt, so she stopped doing it.   Objective Measure 1   Objective Measure Worst Pain   Details Right hip: 4-5/10.  Low back and left hip: 0/10.   Objective Measure 2   Objective Measure TUG   Details Initial evaluation: 17.92 seconds (no assistive device)   Objective Measure 3   Objective Measure 5-time Sit-to-stand   Details Initial evaluation: Unable. Modified: 19.85 seconds (right upper extremity assist on armrest).   Objective Measure 4   Objective Measure FGA   Details 14/30   Objective Measure 5   Objective Measure Lower Extremity Strength   Details Initial evaluation: Hip flexion: Left 4-/5 (mild), right 4/5.  Knee flexion: Left 5/5, right 4+/5 (min).  (All other bilateral lower extremity strength within normal limits)   Objective Measure 6   Objective Measure Hip Testing   Details FADIR: negative bilaterally. ARIANA: pain-free but significantly limited range bilaterally.   Therapeutic Procedure/Exercise   Therapeutic Procedures: strength, endurance, ROM, flexibility minutes (35258) 29   Ther Proc 1 Nustep + subjective report   Ther Proc 1 - Details 6 minutes. See subjective report.   Ther Proc 2 Sit to stand   Ther Proc 2 - Details 2 x 8 (bilateral upper extremity assist on thighs)   Ther Proc 3 Hamstring stretch   Ther Proc 3 - Details Seated: 3 x 30 seconds bilateral   Ther Proc 4 Hip flexor stretch   Ther Proc 4 - Details Supine with leg off of side of table/bed: Will be removed from HEP due to right hip pain. Standing: 3 x 30 seconds bilateral.   Skilled Intervention Exercises to increase lower extremity flexibility/mobility and strength/stability   Patient Response/Progress Added Nustep, seated hip abductio and glut set; changed hip flexor stretch. Livier verbalized  significantly improved toelrance to alternative hip flexor stretch, and she tolerated all other exercises well with cueing for proper form.   Ther Proc 5 Seated hip abduction   Ther Proc 5 - Details 2 x 10 (yellow TheraBand)   Ther Proc 6 Hip testing   Ther Proc 6 - Details See objective measures.   Ther Proc 7 Glut set   Ther Proc 7 - Details Standing: 1 x 5 with 10-second holds (verbal and tactile cueing for proper glut activation, therapist asked for and obtained specific informed consent to palpate patients posterolaterla hips)   Physical Performance Test/measures   Physical Performance Test/Measurement, Minutes (66384) 10   Skilled Intervention FGA + setup/explanation   Patient Response/Progress See objective measures.   Education   Learner/Method Patient;Listening;Demonstration;Pictures/Video;No Barriers to Learning   Plan   Home program See PTRx.   Plan for next session Continue to progress general strength and endurance exercises.  Initiate balance and gait training.   Comments   Comments Impression/Assessment: Livier returns to physical therapy reporting mixed response to her home exercises. The exercise that aggravated her hip was changed, and she reported good tolerance to the alternative stretch, and all other exercises. Further hip testing revealed negative ARIANA and FADIR test bilaterally, though she does have significantly limited ER motion on ARIANA testing, though this is likely due in part to noted guarding, despite report of no pain. Her balance was further assessed with the functional gait assessment, and she scored 14/30, indicating decreased safe and independent functional mobility and an increased fall risk. She will continue to benefit from physical therapy to improve her function and safety with functional activities.   Total Session Time   Timed Code Treatment Minutes 39   Total Treatment Time (sum of timed and untimed services) 39

## 2024-11-07 ENCOUNTER — THERAPY VISIT (OUTPATIENT)
Dept: PHYSICAL THERAPY | Facility: REHABILITATION | Age: 84
End: 2024-11-07
Attending: FAMILY MEDICINE
Payer: COMMERCIAL

## 2024-11-07 DIAGNOSIS — M51.369 DEGENERATION OF INTERVERTEBRAL DISC OF LUMBAR REGION, UNSPECIFIED WHETHER PAIN PRESENT: Primary | ICD-10-CM

## 2024-11-07 PROCEDURE — 97112 NEUROMUSCULAR REEDUCATION: CPT | Mod: GP

## 2024-11-07 PROCEDURE — 97110 THERAPEUTIC EXERCISES: CPT | Mod: GP

## 2024-11-14 NOTE — PROGRESS NOTES
Assessment & Plan     Benign appearing oral lesion most consistent with a small blood vessel.  We discussed topical steroid but as lesion is asymptomatic, patient would prefer to just continue to observe it.  She will let me know if there is any changes.    Fullness most consistent with TMJ.  Patient to try home exercises as per AVS.    Problem List Items Addressed This Visit    None  Visit Diagnoses       Sensorineural hearing loss, bilateral    -  Primary    Oral lesion        Ear fullness, bilateral        TMJ (temporomandibular joint syndrome)                 Review of external notes as documented elsewhere in note    9 minutes spent on the date of the encounter doing chart review, history and exam, documentation and further activities per the note  {     CLARENCE Pedraza  [facility]    Subjective     HPI-Dizziness, ear pressure. Also mouth lesion on right upper palate. Audio @ 1120.     Recent relevant appt:  9/18 PRIMARY CARE PROVIDER:  Mouth lesion: Patient seen by Dr. Bermudez twice   she had a lesion in the upper right hand part of the hard palate that had been noticed by her dentist,  was presumed to be benign and  plan was to simply continue to watch it. But patient is intrested in have it looked by ENT, as she also struggling with ear pressure and pain and also dizziness worsening over last 1 month denies any tinnitus or extreme hearing loss    10/24 p/t:  Yonatan Maier presents to physical therapy reporting that in 2020 she had low back pain with left leg symptoms, and she met with the spine clinic. She had physical therapy which was somewhat helpful, and her symptoms eventually improved without any medication, injection, or other formal treatment. She said that her back and leg symptoms continue to be quite minor, and her main concern is her balance and walking. Her balance and walking issues started when her back symptoms started. She now reports that her  right hip has started bothering her as she compensates for her gait issues. Worst pain: right hip 3-4/10, low back and left leg 3/10. Best pain: right hip 0/10, low back and left leg 0/10. Current: both 0/10. She had numbness and tingling initially, but she has not had any recently. She also denies any saddle anesthesia, bowel/bladder changes, and sudden onset and/or focal muscle weakness. She finds relief being off of her feet, and weight-bearing activities are her primary aggravating factor. She started using a cane about a year ago, but she only uses it outside of the house.    Medical Diagnosis: DDD (degenerative disc disease), lumbar (left side sciatica till butt and back of the leg, she feel she is coping well, but willing to do PT again)    Treatment Diagnosis: Impaired functional mobility, balance, gait, and endurance   Impression/Assessment: Patient is a 83 year old female with low back and leg pain with impaired functional balance and gait complaints.     Audio:    Patient reports symptoms of aural fullness for the past 6-7 months which began with issues with her sinuses. She reports taking Allegra about 3 months ago which helped her symptoms a little, but did not resolve them. She reports some pain/pressure behind her right ear at times with the most recent episode of this occurring about 1.5 months ago. She denies hearing loss, current pain, otorrhea, tinnitus, dizziness, past ear surgery, family history of hearing loss, noise exposure, and prior use of amplification. She reports her dentist saw a spot on the roof of her mouth about 5-6 months ago and recommended she see ENT for it. RESULTS: Otoscopy: clear right ear and mild wax left ear. Tymps: Type A bilaterally. Reflexes: Present right and left ipsi; CNT contra due to equipment limitations. Audio: normal sloping to moderate SNHL bilaterally.    Today reports no further imbalance but still gets ear fullness for several months.   Decongestant helped a  little and these symptoms generally less than they were initially.  .  No teeth grinding,  does clench her jaw.      Oral lesion is painless.      Review of Systems   ENT as above      Objective    There were no vitals taken for this visit.    Physical Exam     Constitutional:   The patient was in no acute distress.      Head/Face:   Normocephalic and atraumatic.  No lesions or scars.     Ears:  The tympanic membranes are normal in appearance, bony landmarks are intact.  No retraction, perforation, or masses.   No fluid or purulence was seen in the external canal or the middle ear. No evidence of infection of the middle ear or external canal, cerumen was normal in appearance.    Nose:  Anterior rhinoscopy revealed midline septum and absence of purulence or polyps.    Mouth:  Normal tongue, floor of mouth, buccal mucosa, and palate.  No lesions, ulceration or  masses on inspection, normal voice quality    On the right upper soft palate there is a well-demarcated linear purpleish flat nonulcerated nontender lesion        Oropharynx:  Normal mucosa, palate symmetric with normal elevation. Tonsils not visualized  * Pterygoid region non-tender w/ b/l crepitus.     Neck:  Supple with normal laryngeal and tracheal landmarks. No palpable thyroid.     Lymphatic:  There is no palpable lymphadenopathy in the neck.

## 2024-11-26 ENCOUNTER — OFFICE VISIT (OUTPATIENT)
Dept: AUDIOLOGY | Facility: CLINIC | Age: 84
End: 2024-11-26
Attending: FAMILY MEDICINE
Payer: COMMERCIAL

## 2024-11-26 ENCOUNTER — OFFICE VISIT (OUTPATIENT)
Dept: OTOLARYNGOLOGY | Facility: CLINIC | Age: 84
End: 2024-11-26
Attending: FAMILY MEDICINE
Payer: COMMERCIAL

## 2024-11-26 DIAGNOSIS — H93.8X3 EAR FULLNESS, BILATERAL: ICD-10-CM

## 2024-11-26 DIAGNOSIS — R42 DIZZINESS: ICD-10-CM

## 2024-11-26 DIAGNOSIS — H93.8X3 EAR PRESSURE, BILATERAL: ICD-10-CM

## 2024-11-26 DIAGNOSIS — M26.609 TMJ (TEMPOROMANDIBULAR JOINT SYNDROME): ICD-10-CM

## 2024-11-26 DIAGNOSIS — H90.3 SENSORINEURAL HEARING LOSS (SNHL) OF BOTH EARS: Primary | ICD-10-CM

## 2024-11-26 DIAGNOSIS — K13.70 ORAL LESION: ICD-10-CM

## 2024-11-26 DIAGNOSIS — K13.70 MOUTH LESION: ICD-10-CM

## 2024-11-26 DIAGNOSIS — H90.3 SENSORINEURAL HEARING LOSS, BILATERAL: Primary | ICD-10-CM

## 2024-11-26 PROCEDURE — 92557 COMPREHENSIVE HEARING TEST: CPT | Performed by: AUDIOLOGIST

## 2024-11-26 PROCEDURE — 92550 TYMPANOMETRY & REFLEX THRESH: CPT | Mod: 52 | Performed by: AUDIOLOGIST

## 2024-11-26 NOTE — PATIENT INSTRUCTIONS
Temporomandibular Disorder: Care Instructions  Overview     Temporomandibular disorders (TMDs) are problems with the muscles and joints that connect your jaw to your skull. These problems cause pain when you talk, chew, swallow, or yawn. You may feel this pain on one or both sides.  TMDs are often caused by tight jaw muscles. The tightness can be caused by clenching or grinding your teeth.  Lowering stress may help relax your jaw and reduce your pain. Your doctor may suggest a dental splint. Splints can help protect the teeth from grinding and clenching.  You may be able to do some things at home to feel better. If that doesn't work for you, your doctor may prescribe medicine to help relax your muscles and control the pain.  Follow-up care is a key part of your treatment and safety. Be sure to make and go to all appointments, and call your doctor if you are having problems. It's also a good idea to know your test results and keep a list of the medicines you take.  How can you care for yourself at home?  Put an ice pack or a warm, moist cloth on your jaw for 15 minutes. Do this several times a day. Try switching back and forth between moist heat and cold.  Search for [place] Youtube videos on TMJ exercises you can do at home.    Ask your doctor if you can take an over-the-counter pain medicine, such as acetaminophen (Tylenol), ibuprofen (Advil, Motrin), or naproxen (Aleve). Be safe with medicines. Read and follow all instructions on the label.  Choose softer foods, such as eggs, yogurt, soup, or pureed foods. Try to avoid hard foods. Cut food into small pieces.  If it doesn't cause pain, practice relaxing your jaw. Gently open and close your mouth. Move your jaw straight up and down. Do this for a few minutes every morning and evening. Watching yourself in a mirror can help.  Have good posture. Try to line up your ears, shoulders, and hips when sitting and standing.  Learn to manage your stress.  "Try:  Relaxation techniques. These may include taking slow, deep breaths, and mindful meditation. It may include progressive muscle relaxation, yoga, juan chi, and qi gong.  Getting at least 30 minutes of exercise on most days to relieve stress. Try walking.  Try not to:  Hold a phone between your shoulder and your jaw.  Open your mouth all the way, like when you sing loudly or yawn.  Clench or grind your teeth, bite your lips, or chew your fingernails.  Clench things between your teeth, such as pens, pipes, or cigars.  When should you call for help?   Call your doctor now or seek immediate medical care if:    Your jaw is locked open or shut or it is hard to move your jaw.   Watch closely for changes in your health, and be sure to contact your doctor if:    Your jaw pain gets worse.     Your face is swollen.     You do not get better as expected.   Where can you learn more?  Go to https://www.[employer].ReVera/patiented  Enter P868 in the search box to learn more about \"Temporomandibular Disorder: Care Instructions.\"  Current as of: August 6, 2023               Content Version: 14.0    9609-2223 [employer].   Care instructions adapted under license by your healthcare professional. If you have questions about a medical condition or this instruction, always ask your healthcare professional. [employer] disclaims any warranty or liability for your use of this information.     Hearing Loss: Care Instructions  Overview     Hearing loss is a sudden or slow decrease in how well you hear. It can range from slight to profound. Permanent hearing loss can occur with aging. It also can happen when you are exposed long-term to loud noise. Examples include listening to loud music, riding motorcycles, or being around other loud machines.  Hearing loss can affect your work and home life. It can make you feel lonely or depressed. You may feel that you have lost your independence. But hearing aids and other " devices can help you hear better and feel connected to others.  Follow-up care is a key part of your treatment and safety. Be sure to make and go to all appointments, and call your doctor if you are having problems. It's also a good idea to know your test results and keep a list of the medicines you take.  How can you care for yourself at home?  Avoid loud noises whenever possible. This helps keep your hearing from getting worse.  Always wear hearing protection around loud noises.  Wear a hearing aid as directed.  A professional can help you pick a hearing aid that will work best for you.  You can also get hearing aids over the counter for mild to moderate hearing loss.  Have hearing tests as your doctor suggests. They can show whether your hearing has changed. Your hearing aid may need to be adjusted.  Use other devices as needed. These may include:  Telephone amplifiers and hearing aids that can connect to a television, stereo, radio, or microphone.  Devices that use lights or vibrations. These alert you to the doorbell, a ringing telephone, or a baby monitor.  Television closed-captioning. This shows the words at the bottom of the screen. Most new TVs can do this.  TTY (text telephone). This lets you type messages back and forth on the telephone instead of talking or listening. These devices are also called TDD. When messages are typed on the keyboard, they are sent over the phone line to a receiving TTY. The message is shown on a monitor.  Use text messaging, social media, and email if it is hard for you to communicate by telephone.  Try to learn a listening technique called speechreading. It is not lipreading. You pay attention to people's gestures, expressions, posture, and tone of voice. These clues can help you understand what a person is saying. Face the person you are talking to, and have them face you. Make sure the lighting is good. You need to see the other person's face clearly.  Think about counseling  "if you need help to adjust to your hearing loss.  When should you call for help?  Watch closely for changes in your health, and be sure to contact your doctor if:    You think your hearing is getting worse.     You have new symptoms, such as dizziness or nausea.   Where can you learn more?  Go to https://www.[employer].net/patiented  Enter R798 in the search box to learn more about \"Hearing Loss: Care Instructions.\"  Current as of: September 27, 2023               Content Version: 14.0    3353-8963 [employer].   Care instructions adapted under license by your healthcare professional. If you have questions about a medical condition or this instruction, always ask your healthcare professional. [employer] disclaims any warranty or liability for your use of this information.   "

## 2024-11-26 NOTE — PROGRESS NOTES
AUDIOLOGY REPORT     SUMMARY: Audiology visit completed. See audiogram for results.       RECOMMENDATIONS: Follow-up with ENT.    Luda Roth, CCC-A  [place] Licensed Audiologist #[id number]

## 2024-11-26 NOTE — LETTER
11/26/2024      Livier Bowers  [address]  [facility] [address]      Dear Colleague,    Thank you for referring your patient, Livier Bowers, to the [facility]. Please see a copy of my visit note below.    Assessment & Plan    Benign appearing oral lesion most consistent with a small blood vessel.  We discussed topical steroid but as lesion is asymptomatic, patient would prefer to just continue to observe it.  She will let me know if there is any changes.    Fullness most consistent with TMJ.  Patient to try home exercises as per AVS.    Problem List Items Addressed This Visit    None  Visit Diagnoses       Sensorineural hearing loss, bilateral    -  Primary    Oral lesion        Ear fullness, bilateral        TMJ (temporomandibular joint syndrome)                 Review of external notes as documented elsewhere in note    9 minutes spent on the date of the encounter doing chart review, history and exam, documentation and further activities per the note  {     CLARENCE Pedraza  [facility]    Subjective     HPI-Dizziness, ear pressure. Also mouth lesion on right upper palate. Audio @ 1120.     Recent relevant appt:  9/18 PRIMARY CARE PROVIDER:  Mouth lesion: Patient seen by Dr. Bermudez twice   she had a lesion in the upper right hand part of the hard palate that had been noticed by her dentist,  was presumed to be benign and  plan was to simply continue to watch it. But patient is intrested in have it looked by ENT, as she also struggling with ear pressure and pain and also dizziness worsening over last 1 month denies any tinnitus or extreme hearing loss    10/24 p/t:  Yonatan Maier presents to physical therapy reporting that in 2020 she had low back pain with left leg symptoms, and she met with the spine clinic. She had physical therapy which was somewhat helpful, and her symptoms eventually improved without any medication, injection, or other  formal treatment. She said that her back and leg symptoms continue to be quite minor, and her main concern is her balance and walking. Her balance and walking issues started when her back symptoms started. She now reports that her right hip has started bothering her as she compensates for her gait issues. Worst pain: right hip 3-4/10, low back and left leg 3/10. Best pain: right hip 0/10, low back and left leg 0/10. Current: both 0/10. She had numbness and tingling initially, but she has not had any recently. She also denies any saddle anesthesia, bowel/bladder changes, and sudden onset and/or focal muscle weakness. She finds relief being off of her feet, and weight-bearing activities are her primary aggravating factor. She started using a cane about a year ago, but she only uses it outside of the house.    Medical Diagnosis: DDD (degenerative disc disease), lumbar (left side sciatica till butt and back of the leg, she feel she is coping well, but willing to do PT again)    Treatment Diagnosis: Impaired functional mobility, balance, gait, and endurance   Impression/Assessment: Patient is a 83 year old female with low back and leg pain with impaired functional balance and gait complaints.     Audio:    Patient reports symptoms of aural fullness for the past 6-7 months which began with issues with her sinuses. She reports taking Allegra about 3 months ago which helped her symptoms a little, but did not resolve them. She reports some pain/pressure behind her right ear at times with the most recent episode of this occurring about 1.5 months ago. She denies hearing loss, current pain, otorrhea, tinnitus, dizziness, past ear surgery, family history of hearing loss, noise exposure, and prior use of amplification. She reports her dentist saw a spot on the roof of her mouth about 5-6 months ago and recommended she see ENT for it. RESULTS: Otoscopy: clear right ear and mild wax left ear. Tymps: Type A bilaterally. Reflexes:  Present right and left ipsi; CNT contra due to equipment limitations. Audio: normal sloping to moderate SNHL bilaterally.    Today reports no further imbalance but still gets ear fullness for several months.   Decongestant helped a little and these symptoms generally less than they were initially.  .  No teeth grinding,  does clench her jaw.      Oral lesion is painless.      Review of Systems   ENT as above      Objective    There were no vitals taken for this visit.    Physical Exam     Constitutional:   The patient was in no acute distress.      Head/Face:   Normocephalic and atraumatic.  No lesions or scars.     Ears:  The tympanic membranes are normal in appearance, bony landmarks are intact.  No retraction, perforation, or masses.   No fluid or purulence was seen in the external canal or the middle ear. No evidence of infection of the middle ear or external canal, cerumen was normal in appearance.    Nose:  Anterior rhinoscopy revealed midline septum and absence of purulence or polyps.    Mouth:  Normal tongue, floor of mouth, buccal mucosa, and palate.  No lesions, ulceration or  masses on inspection, normal voice quality    On the right upper soft palate there is a well-demarcated linear purpleish flat nonulcerated nontender lesion        Oropharynx:  Normal mucosa, palate symmetric with normal elevation. Tonsils not visualized  * Pterygoid region non-tender w/ b/l crepitus.     Neck:  Supple with normal laryngeal and tracheal landmarks. No palpable thyroid.     Lymphatic:  There is no palpable lymphadenopathy in the neck.                    Again, thank you for allowing me to participate in the care of your patient.        Sincerely,        CLARENCE Pedraza

## 2024-11-27 ENCOUNTER — ONCOLOGY VISIT (OUTPATIENT)
Dept: ONCOLOGY | Facility: HOSPITAL | Age: 84
End: 2024-11-27
Attending: NURSE PRACTITIONER
Payer: COMMERCIAL

## 2024-11-27 ENCOUNTER — LAB (OUTPATIENT)
Dept: INFUSION THERAPY | Facility: HOSPITAL | Age: 84
End: 2024-11-27
Attending: INTERNAL MEDICINE
Payer: COMMERCIAL

## 2024-11-27 VITALS
SYSTOLIC BLOOD PRESSURE: 152 MMHG | RESPIRATION RATE: 19 BRPM | WEIGHT: 109.6 LBS | DIASTOLIC BLOOD PRESSURE: 88 MMHG | OXYGEN SATURATION: 98 % | HEART RATE: 90 BPM | BODY MASS INDEX: 19.42 KG/M2 | HEIGHT: 63 IN

## 2024-11-27 DIAGNOSIS — D45 POLYCYTHEMIA VERA (H): ICD-10-CM

## 2024-11-27 DIAGNOSIS — D45 POLYCYTHEMIA VERA (H): Primary | ICD-10-CM

## 2024-11-27 LAB
ALBUMIN SERPL BCG-MCNC: 4.3 G/DL (ref 3.5–5.2)
ALP SERPL-CCNC: 64 U/L (ref 40–150)
ALT SERPL W P-5'-P-CCNC: 15 U/L (ref 0–50)
ANION GAP SERPL CALCULATED.3IONS-SCNC: 10 MMOL/L (ref 7–15)
AST SERPL W P-5'-P-CCNC: 24 U/L (ref 0–45)
BASOPHILS # BLD AUTO: 0.1 10E3/UL (ref 0–0.2)
BASOPHILS NFR BLD AUTO: 1 %
BILIRUB SERPL-MCNC: 0.3 MG/DL
BUN SERPL-MCNC: 16.5 MG/DL (ref 8–23)
CALCIUM SERPL-MCNC: 9.5 MG/DL (ref 8.8–10.4)
CHLORIDE SERPL-SCNC: 105 MMOL/L (ref 98–107)
CREAT SERPL-MCNC: 0.98 MG/DL (ref 0.51–0.95)
EGFRCR SERPLBLD CKD-EPI 2021: 58 ML/MIN/1.73M2
EOSINOPHIL # BLD AUTO: 0.1 10E3/UL (ref 0–0.7)
EOSINOPHIL NFR BLD AUTO: 1 %
ERYTHROCYTE [DISTWIDTH] IN BLOOD BY AUTOMATED COUNT: 13.1 % (ref 10–15)
GLUCOSE SERPL-MCNC: 148 MG/DL (ref 70–99)
HCO3 SERPL-SCNC: 27 MMOL/L (ref 22–29)
HCT VFR BLD AUTO: 45.2 % (ref 35–47)
HGB BLD-MCNC: 15.3 G/DL (ref 11.7–15.7)
IMM GRANULOCYTES # BLD: 0 10E3/UL
IMM GRANULOCYTES NFR BLD: 0 %
LYMPHOCYTES # BLD AUTO: 1.4 10E3/UL (ref 0.8–5.3)
LYMPHOCYTES NFR BLD AUTO: 18 %
MCH RBC QN AUTO: 36.6 PG (ref 26.5–33)
MCHC RBC AUTO-ENTMCNC: 33.8 G/DL (ref 31.5–36.5)
MCV RBC AUTO: 108 FL (ref 78–100)
MONOCYTES # BLD AUTO: 0.6 10E3/UL (ref 0–1.3)
MONOCYTES NFR BLD AUTO: 7 %
NEUTROPHILS # BLD AUTO: 5.5 10E3/UL (ref 1.6–8.3)
NEUTROPHILS NFR BLD AUTO: 72 %
NRBC # BLD AUTO: 0 10E3/UL
NRBC BLD AUTO-RTO: 0 /100
PLATELET # BLD AUTO: 265 10E3/UL (ref 150–450)
POTASSIUM SERPL-SCNC: 4.3 MMOL/L (ref 3.4–5.3)
PROT SERPL-MCNC: 6.5 G/DL (ref 6.4–8.3)
RBC # BLD AUTO: 4.18 10E6/UL (ref 3.8–5.2)
SODIUM SERPL-SCNC: 142 MMOL/L (ref 135–145)
WBC # BLD AUTO: 7.6 10E3/UL (ref 4–11)

## 2024-11-27 PROCEDURE — 36415 COLL VENOUS BLD VENIPUNCTURE: CPT

## 2024-11-27 PROCEDURE — 85048 AUTOMATED LEUKOCYTE COUNT: CPT

## 2024-11-27 PROCEDURE — 99214 OFFICE O/P EST MOD 30 MIN: CPT | Performed by: INTERNAL MEDICINE

## 2024-11-27 PROCEDURE — G0463 HOSPITAL OUTPT CLINIC VISIT: HCPCS | Performed by: INTERNAL MEDICINE

## 2024-11-27 PROCEDURE — 80053 COMPREHEN METABOLIC PANEL: CPT

## 2024-11-27 PROCEDURE — 85004 AUTOMATED DIFF WBC COUNT: CPT

## 2024-11-27 PROCEDURE — G2211 COMPLEX E/M VISIT ADD ON: HCPCS | Performed by: INTERNAL MEDICINE

## 2024-11-27 ASSESSMENT — PAIN SCALES - GENERAL: PAINLEVEL_OUTOF10: NO PAIN (0)

## 2024-11-27 NOTE — PROGRESS NOTES
"Oncology Rooming Note    November 27, 2024 2:07 PM   Livier Bowers is a 83 year old female who presents for:    Chief Complaint   Patient presents with    RECHECK     Polycythemia vera      Initial Vitals: BP (!) 152/88 (BP Location: Left arm, Patient Position: Sitting, Cuff Size: Adult Regular)   Pulse 90   Resp 19   Ht 1.6 m (5' 3\")   Wt 49.7 kg (109 lb 9.6 oz)   SpO2 98%   BMI 19.41 kg/m   Estimated body mass index is 19.41 kg/m  as calculated from the following:    Height as of this encounter: 1.6 m (5' 3\").    Weight as of this encounter: 49.7 kg (109 lb 9.6 oz). Body surface area is 1.49 meters squared.  No Pain (0) Comment: Data Unavailable   No LMP recorded. Patient is postmenopausal.  Allergies reviewed: Yes  Medications reviewed: Yes    Medications: Medication refills not needed today.  Pharmacy name entered into [facility]: [id number] [place] [address]    Frailty Screening:   Is the patient here for a new oncology consult visit in cancer care? 2. No      Clinical concerns:  Follow up. No concerns per patient.       Roberta Starkey MA            "

## 2024-11-27 NOTE — PROGRESS NOTES
"[facility] Hematology and Oncology Progress Note    Patient: Livier Bowers  MRN: [id number]  Date of Service: Nov 27, 2024             ECOG Performance    0 - Independent          ______________________________________________________________________________  Oncologic history  Patient has had a diagnosis of polycythemia since 2010.  JAK2 mutation negative patient has a 55-pack-year history of smoking  Has been on variable doses of Hydrea since at least 2013  Received phlebotomies from [id number]  Currently taking Hydrea 500 mg daily    History of Present Illness    Ms. Livier Bowers is here in follow-up.  She feels fine.  She reports that she has been taking 500 mg of Hydrea daily.  She quit smoking 5 years ago.  Her appetite is good her weight is stable her energy level is fine    Review of systems  A comprehensive 12 point review of system was done that was negative except what is mentioned in the history of present illness    Past History    No past medical history on file.    Past Surgical History:   Procedure Laterality Date    CATARACT EXTRACTION Left     DILATION AND CURETTAGE      KNEE SURGERY      torn meniscus    THYROIDECTOMY, PARTIAL N/A 12/2/2014    Procedure: RIGHT PARATHYROID EXCISION ;  Surgeon: Tressa Louis MD;  Location: [facility];  Service:        Physical Exam    BP (!) 152/88 (BP Location: Left arm, Patient Position: Sitting, Cuff Size: Adult Regular)   Pulse 90   Resp 19   Ht 1.6 m (5' 3\")   Wt 49.7 kg (109 lb 9.6 oz)   SpO2 98%   BMI 19.41 kg/m      General: alert, awake, not in acute distress  HEENT: Head: Normal, normocephalic, atraumatic.  Eye: Normal external eye, conjunctiva, lids cornea, RAVINDER.  Nose: Normal external nose, mucus membranes and septum.  Pharynx: Normal buccal mucosa. Normal pharynx.  Neck / Thyroid: Supple, no masses, nodes, nodules or enlargement.  Lymphatics: No abnormally enlarged lymph nodes.  Chest: Normal chest wall and " respirations. Clear to auscultation.  No crackles or rhonchi's  Heart: S1 S2 RRR, no murmur.   Abdomen: abdomen is soft without significant tenderness, masses, organomegaly or guarding  Extremities: normal strength, tone, and muscle mass  Skin: normal. no rash or abnormalities  CNS: non focal.    Lab Results    Recent Results (from the past 240 hours)   Comprehensive metabolic panel (BMP + Alb, Alk Phos, ALT, AST, Total. Bili, TP)    Collection Time: 11/27/24  1:44 PM   Result Value Ref Range    Sodium 142 135 - 145 mmol/L    Potassium 4.3 3.4 - 5.3 mmol/L    Carbon Dioxide (CO2) 27 22 - 29 mmol/L    Anion Gap 10 7 - 15 mmol/L    Urea Nitrogen 16.5 8.0 - 23.0 mg/dL    Creatinine 0.98 (H) 0.51 - 0.95 mg/dL    GFR Estimate 58 (L) >60 mL/min/1.73m2    Calcium 9.5 8.8 - 10.4 mg/dL    Chloride 105 98 - 107 mmol/L    Glucose 148 (H) 70 - 99 mg/dL    Alkaline Phosphatase 64 40 - 150 U/L    AST 24 0 - 45 U/L    ALT 15 0 - 50 U/L    Protein Total 6.5 6.4 - 8.3 g/dL    Albumin 4.3 3.5 - 5.2 g/dL    Bilirubin Total 0.3 <=1.2 mg/dL   CBC with platelets and differential    Collection Time: 11/27/24  1:44 PM   Result Value Ref Range    WBC Count 7.6 4.0 - 11.0 10e3/uL    RBC Count 4.18 3.80 - 5.20 10e6/uL    Hemoglobin 15.3 11.7 - 15.7 g/dL    Hematocrit 45.2 35.0 - 47.0 %     (H) 78 - 100 fL    MCH 36.6 (H) 26.5 - 33.0 pg    MCHC 33.8 31.5 - 36.5 g/dL    RDW 13.1 10.0 - 15.0 %    Platelet Count 265 150 - 450 10e3/uL    % Neutrophils 72 %    % Lymphocytes 18 %    % Monocytes 7 %    % Eosinophils 1 %    % Basophils 1 %    % Immature Granulocytes 0 %    NRBCs per 100 WBC 0 <1 /100    Absolute Neutrophils 5.5 1.6 - 8.3 10e3/uL    Absolute Lymphocytes 1.4 0.8 - 5.3 10e3/uL    Absolute Monocytes 0.6 0.0 - 1.3 10e3/uL    Absolute Eosinophils 0.1 0.0 - 0.7 10e3/uL    Absolute Basophils 0.1 0.0 - 0.2 10e3/uL    Absolute Immature Granulocytes 0.0 <=0.4 10e3/uL    Absolute NRBCs 0.0 10e3/uL         Imaging    No results  found.        Assessment and Plan    Polycythemia  Patient is here in follow-up.  I reviewed her records in detail.  Patient JAK2 mutation is negative and she did have a secondary cause of polycythemia which is her smoking however she has been on Hydrea for more than 12 years.  She is currently taking 500 mg p.o. daily.  She is tolerating it well and has no side effects.  Her hematocrit is 45 today.  I explained to her that I will have a low threshold of cutting the dose down because of her history.  I for now we will continue to check CBC every 4 months and a visit with me again in 12 months.  I will plan to cut the dose of Hydrea down if the hematocrit goes below 43 or if she develops any other cytopenias.  Patient had multiple questions today all of them were answered.    Signed by: Debbie Saenz MD        CC: Va Patten MD

## 2024-11-27 NOTE — LETTER
"11/27/2024      Livier Bowers  [address]  [facility] [address]      Dear Colleague,    Thank you for referring your patient, Livier Bowers, to the [facility]. Please see a copy of my visit note below.    Oncology Rooming Note    November 27, 2024 2:07 PM   Livier Bowers is a 83 year old female who presents for:    Chief Complaint   Patient presents with     RECHECK     Polycythemia vera      Initial Vitals: BP (!) 152/88 (BP Location: Left arm, Patient Position: Sitting, Cuff Size: Adult Regular)   Pulse 90   Resp 19   Ht 1.6 m (5' 3\")   Wt 49.7 kg (109 lb 9.6 oz)   SpO2 98%   BMI 19.41 kg/m   Estimated body mass index is 19.41 kg/m  as calculated from the following:    Height as of this encounter: 1.6 m (5' 3\").    Weight as of this encounter: 49.7 kg (109 lb 9.6 oz). Body surface area is 1.49 meters squared.  No Pain (0) Comment: Data Unavailable   No LMP recorded. Patient is postmenopausal.  Allergies reviewed: Yes  Medications reviewed: Yes    Medications: Medication refills not needed today.  Pharmacy name entered into [facility]: [facility] [id number] [place] [address]    Frailty Screening:   Is the patient here for a new oncology consult visit in cancer care? 2. No      Clinical concerns:  Follow up. No concerns per patient.       Roberta Starkey MA              [facility] Progress Note    Patient: Livier Bowers  MRN: [id number]  Date of Service: Nov 27, 2024             ECOG Performance    0 - Independent          ______________________________________________________________________________  Oncologic history  Patient has had a diagnosis of polycythemia since 2010.  JAK2 mutation negative patient has a 55-pack-year history of smoking  Has been on variable doses of Hydrea since at least 2013  Received phlebotomies from [id number]  Currently taking Hydrea 500 mg daily    History of Present Illness    Ms. Livier JENNINGSchampandres is " "here in follow-up.  She feels fine.  She reports that she has been taking 500 mg of Hydrea daily.  She quit smoking 5 years ago.  Her appetite is good her weight is stable her energy level is fine    Review of systems  A comprehensive 12 point review of system was done that was negative except what is mentioned in the history of present illness    Past History    No past medical history on file.    Past Surgical History:   Procedure Laterality Date     CATARACT EXTRACTION Left      DILATION AND CURETTAGE       KNEE SURGERY      torn meniscus     THYROIDECTOMY, PARTIAL N/A 12/2/2014    Procedure: RIGHT PARATHYROID EXCISION ;  Surgeon: Tressa Louis MD;  Location: [facility];  Service:        Physical Exam    BP (!) 152/88 (BP Location: Left arm, Patient Position: Sitting, Cuff Size: Adult Regular)   Pulse 90   Resp 19   Ht 1.6 m (5' 3\")   Wt 49.7 kg (109 lb 9.6 oz)   SpO2 98%   BMI 19.41 kg/m      General: alert, awake, not in acute distress  HEENT: Head: Normal, normocephalic, atraumatic.  Eye: Normal external eye, conjunctiva, lids cornea, RAVINDER.  Nose: Normal external nose, mucus membranes and septum.  Pharynx: Normal buccal mucosa. Normal pharynx.  Neck / Thyroid: Supple, no masses, nodes, nodules or enlargement.  Lymphatics: No abnormally enlarged lymph nodes.  Chest: Normal chest wall and respirations. Clear to auscultation.  No crackles or rhonchi's  Heart: S1 S2 RRR, no murmur.   Abdomen: abdomen is soft without significant tenderness, masses, organomegaly or guarding  Extremities: normal strength, tone, and muscle mass  Skin: normal. no rash or abnormalities  CNS: non focal.    Lab Results    Recent Results (from the past 240 hours)   Comprehensive metabolic panel (BMP + Alb, Alk Phos, ALT, AST, Total. Bili, TP)    Collection Time: 11/27/24  1:44 PM   Result Value Ref Range    Sodium 142 135 - 145 mmol/L    Potassium 4.3 3.4 - 5.3 mmol/L    Carbon Dioxide (CO2) 27 22 - 29 mmol/L "    Anion Gap 10 7 - 15 mmol/L    Urea Nitrogen 16.5 8.0 - 23.0 mg/dL    Creatinine 0.98 (H) 0.51 - 0.95 mg/dL    GFR Estimate 58 (L) >60 mL/min/1.73m2    Calcium 9.5 8.8 - 10.4 mg/dL    Chloride 105 98 - 107 mmol/L    Glucose 148 (H) 70 - 99 mg/dL    Alkaline Phosphatase 64 40 - 150 U/L    AST 24 0 - 45 U/L    ALT 15 0 - 50 U/L    Protein Total 6.5 6.4 - 8.3 g/dL    Albumin 4.3 3.5 - 5.2 g/dL    Bilirubin Total 0.3 <=1.2 mg/dL   CBC with platelets and differential    Collection Time: 11/27/24  1:44 PM   Result Value Ref Range    WBC Count 7.6 4.0 - 11.0 10e3/uL    RBC Count 4.18 3.80 - 5.20 10e6/uL    Hemoglobin 15.3 11.7 - 15.7 g/dL    Hematocrit 45.2 35.0 - 47.0 %     (H) 78 - 100 fL    MCH 36.6 (H) 26.5 - 33.0 pg    MCHC 33.8 31.5 - 36.5 g/dL    RDW 13.1 10.0 - 15.0 %    Platelet Count 265 150 - 450 10e3/uL    % Neutrophils 72 %    % Lymphocytes 18 %    % Monocytes 7 %    % Eosinophils 1 %    % Basophils 1 %    % Immature Granulocytes 0 %    NRBCs per 100 WBC 0 <1 /100    Absolute Neutrophils 5.5 1.6 - 8.3 10e3/uL    Absolute Lymphocytes 1.4 0.8 - 5.3 10e3/uL    Absolute Monocytes 0.6 0.0 - 1.3 10e3/uL    Absolute Eosinophils 0.1 0.0 - 0.7 10e3/uL    Absolute Basophils 0.1 0.0 - 0.2 10e3/uL    Absolute Immature Granulocytes 0.0 <=0.4 10e3/uL    Absolute NRBCs 0.0 10e3/uL         Imaging    No results found.        Assessment and Plan    Polycythemia  Patient is here in follow-up.  I reviewed her records in detail.  Patient JAK2 mutation is negative and she did have a secondary cause of polycythemia which is her smoking however she has been on Hydrea for more than 12 years.  She is currently taking 500 mg p.o. daily.  She is tolerating it well and has no side effects.  Her hematocrit is 45 today.  I explained to her that I will have a low threshold of cutting the dose down because of her history.  I for now we will continue to check CBC every 4 months and a visit with me again in 12 months.  I will plan to  cut the dose of Hydrea down if the hematocrit goes below 43 or if she develops any other cytopenias.  Patient had multiple questions today all of them were answered.    Signed by: Debbie Saenz MD        CC: Va Patten MD       Again, thank you for allowing me to participate in the care of your patient.        Sincerely,        Debbie Saenz MD

## 2025-03-25 ENCOUNTER — LAB (OUTPATIENT)
Dept: INFUSION THERAPY | Facility: HOSPITAL | Age: 85
End: 2025-03-25
Attending: INTERNAL MEDICINE
Payer: COMMERCIAL

## 2025-03-25 ENCOUNTER — PATIENT OUTREACH (OUTPATIENT)
Dept: ONCOLOGY | Facility: HOSPITAL | Age: 85
End: 2025-03-25

## 2025-03-25 DIAGNOSIS — D45 POLYCYTHEMIA VERA (H): ICD-10-CM

## 2025-03-25 LAB
BASOPHILS # BLD AUTO: 0.1 10E3/UL (ref 0–0.2)
BASOPHILS NFR BLD AUTO: 1 %
EOSINOPHIL # BLD AUTO: 0.2 10E3/UL (ref 0–0.7)
EOSINOPHIL NFR BLD AUTO: 2 %
ERYTHROCYTE [DISTWIDTH] IN BLOOD BY AUTOMATED COUNT: 13.3 % (ref 10–15)
HCT VFR BLD AUTO: 43.6 % (ref 35–47)
HGB BLD-MCNC: 14.5 G/DL (ref 11.7–15.7)
IMM GRANULOCYTES # BLD: 0 10E3/UL
IMM GRANULOCYTES NFR BLD: 0 %
LYMPHOCYTES # BLD AUTO: 1.3 10E3/UL (ref 0.8–5.3)
LYMPHOCYTES NFR BLD AUTO: 18 %
MCH RBC QN AUTO: 36.4 PG (ref 26.5–33)
MCHC RBC AUTO-ENTMCNC: 33.3 G/DL (ref 31.5–36.5)
MCV RBC AUTO: 110 FL (ref 78–100)
MONOCYTES # BLD AUTO: 0.7 10E3/UL (ref 0–1.3)
MONOCYTES NFR BLD AUTO: 10 %
NEUTROPHILS # BLD AUTO: 5.1 10E3/UL (ref 1.6–8.3)
NEUTROPHILS NFR BLD AUTO: 69 %
NRBC # BLD AUTO: 0 10E3/UL
NRBC BLD AUTO-RTO: 0 /100
PLATELET # BLD AUTO: 271 10E3/UL (ref 150–450)
RBC # BLD AUTO: 3.98 10E6/UL (ref 3.8–5.2)
WBC # BLD AUTO: 7.4 10E3/UL (ref 4–11)

## 2025-03-25 PROCEDURE — 36415 COLL VENOUS BLD VENIPUNCTURE: CPT

## 2025-03-25 PROCEDURE — 85041 AUTOMATED RBC COUNT: CPT

## 2025-03-25 PROCEDURE — 85048 AUTOMATED LEUKOCYTE COUNT: CPT

## 2025-03-25 PROCEDURE — 85004 AUTOMATED DIFF WBC COUNT: CPT

## 2025-03-25 NOTE — PROGRESS NOTES
[facility]: Cancer Care Follow-Up Note                                    Discussion with Patient:                                                      Patient  has Dx of  Polycythemia and had 4 month lab only to recheck her CBC and reassess her Hydrea Dose.   Phoned Livier and shared that Dr. Saenz reviewed her lab results and her CBC is stable. He would like her to continue the same dose of Hydrea. Confirmed that she is taking 500 mg Hydrea daily and  she will continue to take this dosing. She should keep her lab only apt on 7/22/25 to recheck her CBC and we will call her afterward to review her labs and plan. She stated understanding and appreciation of the call.     Dates of Treatment:                                                      Infusion given in last 28 days       None            Assessment:                                                      Labs from 3/25/25 are stable, no change in Hydrea dose, continue 500 mg Hydrea daily    Intervention/Education provided during outreach:                                                       See above    Confirmed patient has clinic and triage numbers    Signature:  Puja Dawson RN

## 2025-07-06 DIAGNOSIS — D45 POLYCYTHEMIA VERA (H): ICD-10-CM

## 2025-07-08 RX ORDER — HYDROXYUREA 500 MG/1
CAPSULE ORAL
Qty: 60 CAPSULE | Refills: 3 | Status: SHIPPED | OUTPATIENT
Start: 2025-07-08

## 2025-07-22 ENCOUNTER — LAB (OUTPATIENT)
Dept: INFUSION THERAPY | Facility: HOSPITAL | Age: 85
End: 2025-07-22
Attending: INTERNAL MEDICINE
Payer: COMMERCIAL

## 2025-07-22 DIAGNOSIS — D45 POLYCYTHEMIA VERA (H): ICD-10-CM

## 2025-07-22 LAB
BASOPHILS # BLD AUTO: 0.1 10E3/UL (ref 0–0.2)
BASOPHILS NFR BLD AUTO: 1 %
EOSINOPHIL # BLD AUTO: 0.3 10E3/UL (ref 0–0.7)
EOSINOPHIL NFR BLD AUTO: 4 %
ERYTHROCYTE [DISTWIDTH] IN BLOOD BY AUTOMATED COUNT: 14 % (ref 10–15)
HCT VFR BLD AUTO: 45.1 % (ref 35–47)
HGB BLD-MCNC: 14.7 G/DL (ref 11.7–15.7)
IMM GRANULOCYTES # BLD: 0 10E3/UL
IMM GRANULOCYTES NFR BLD: 0 %
LYMPHOCYTES # BLD AUTO: 1.5 10E3/UL (ref 0.8–5.3)
LYMPHOCYTES NFR BLD AUTO: 20 %
MCH RBC QN AUTO: 35.5 PG (ref 26.5–33)
MCHC RBC AUTO-ENTMCNC: 32.6 G/DL (ref 31.5–36.5)
MCV RBC AUTO: 109 FL (ref 78–100)
MONOCYTES # BLD AUTO: 0.6 10E3/UL (ref 0–1.3)
MONOCYTES NFR BLD AUTO: 9 %
NEUTROPHILS # BLD AUTO: 4.9 10E3/UL (ref 1.6–8.3)
NEUTROPHILS NFR BLD AUTO: 66 %
NRBC # BLD AUTO: 0 10E3/UL
NRBC BLD AUTO-RTO: 0 /100
PLATELET # BLD AUTO: 257 10E3/UL (ref 150–450)
RBC # BLD AUTO: 4.14 10E6/UL (ref 3.8–5.2)
WBC # BLD AUTO: 7.4 10E3/UL (ref 4–11)

## 2025-07-22 PROCEDURE — 36415 COLL VENOUS BLD VENIPUNCTURE: CPT

## 2025-07-22 PROCEDURE — 85004 AUTOMATED DIFF WBC COUNT: CPT

## 2025-07-23 ENCOUNTER — PATIENT OUTREACH (OUTPATIENT)
Dept: ONCOLOGY | Facility: HOSPITAL | Age: 85
End: 2025-07-23
Payer: COMMERCIAL

## 2025-07-23 NOTE — PROGRESS NOTES
[facility]: Cancer Care Follow-Up Note                                    Discussion with Patient:                                                      Pt has Dx of  Polycythemia and had 4 month lab only  on 7/22/25 to recheck her CBC and reassess her Hydrea Dose.     1130 Called Livier and Saman that writer was calling to follow up with her regarding labs and plan LM to return call to writer    0761 Spoke with Livier and relayed that her 7/22/25 labs resulted. Hgb 14.7 and Plt 257, labs are stable   Confirmed that she is currently takes 500 mg Hydrea daily and relayed that  should continue this  same dose,  She is scheduled for her next lab and follow up apt on 11/11/25 and she should keep her apts as scheduled.  She stated understanding of the above and appreciation for the call.      07/22/25 13:15   WBC: 7.4   Hemoglobin: 14.7   Hematocrit: 45.1   Platelet Count: 257   RBC Count: 4.14   MCV: 109 (H)   MCH: 35.5 (H)   MCHC: 32.6   RDW: 14.0   % Neutrophils: 66   % Lymphocytes: 20   % Monocytes: 9   % Eosinophils: 4   % Basophils: 1   % Immature Granulocytes: 0   NRBC/W: 0   Absolute Neutrophil: 4.9   Absolute Lymphocytes: 1.5   Absolute Monocytes: 0.6   Absolute Eosinophils: 0.3   Absolute Basophils: 0.1   Absolute Immature Granulocytes: 0.0   Absolute NRBCs: 0.0       Dates of Treatment:                                                      Infusion given in last 28 days       None            Assessment:                                                      CBC from 7/23/25 stable, Continue Hydrea 500 mg daily        Intervention/Education provided during outreach:                                                       See above    Confirmed patient has clinic and triage numbers    Signature:  Puja Dawson RN

## 2025-08-19 ENCOUNTER — PATIENT OUTREACH (OUTPATIENT)
Dept: CARE COORDINATION | Facility: CLINIC | Age: 85
End: 2025-08-19
Payer: COMMERCIAL

## 2025-09-02 ENCOUNTER — PATIENT OUTREACH (OUTPATIENT)
Dept: CARE COORDINATION | Facility: CLINIC | Age: 85
End: 2025-09-02
Payer: COMMERCIAL